# Patient Record
Sex: FEMALE | Race: BLACK OR AFRICAN AMERICAN | Employment: UNEMPLOYED | URBAN - METROPOLITAN AREA
[De-identification: names, ages, dates, MRNs, and addresses within clinical notes are randomized per-mention and may not be internally consistent; named-entity substitution may affect disease eponyms.]

---

## 2021-05-25 ENCOUNTER — HOSPITAL ENCOUNTER (OUTPATIENT)
Dept: PREADMISSION TESTING | Age: 26
Discharge: HOME | End: 2021-05-25
Payer: COMMERCIAL

## 2021-05-25 PROCEDURE — U0003 INFECTIOUS AGENT DETECTION BY NUCLEIC ACID (DNA OR RNA); SEVERE ACUTE RESPIRATORY SYNDROME CORONAVIRUS 2 (SARS-COV-2) (CORONAVIRUS DISEASE [COVID-19]), AMPLIFIED PROBE TECHNIQUE, MAKING USE OF HIGH THROUGHPUT TECHNOLOGIES AS DESCRIBED BY CMS-2020-01-R: HCPCS

## 2021-05-26 ENCOUNTER — ANESTHESIA EVENT (OUTPATIENT)
Dept: SURGERY | Age: 26
End: 2021-05-26
Payer: COMMERCIAL

## 2021-05-26 LAB — SARS-COV-2, COV2NT: NOT DETECTED

## 2021-05-26 NOTE — PERIOP NOTES
Fax request forms over as well as called,But was unable to reach anyone . I needed Hp's  for Dr Alvarez keyes for tomorrow .

## 2021-05-27 ENCOUNTER — HOSPITAL ENCOUNTER (OUTPATIENT)
Age: 26
Setting detail: OUTPATIENT SURGERY
Discharge: HOME | End: 2021-05-27
Attending: SURGERY | Admitting: SURGERY
Payer: COMMERCIAL

## 2021-05-27 ENCOUNTER — ANESTHESIA (OUTPATIENT)
Dept: SURGERY | Age: 26
End: 2021-05-27
Payer: COMMERCIAL

## 2021-05-27 VITALS
RESPIRATION RATE: 18 BRPM | OXYGEN SATURATION: 99 % | TEMPERATURE: 97.3 F | WEIGHT: 160.9 LBS | SYSTOLIC BLOOD PRESSURE: 164 MMHG | HEART RATE: 60 BPM | HEIGHT: 66 IN | DIASTOLIC BLOOD PRESSURE: 112 MMHG | BODY MASS INDEX: 25.86 KG/M2

## 2021-05-27 LAB — HCG UR QL: NEGATIVE

## 2021-05-27 PROCEDURE — 81025 URINE PREGNANCY TEST: CPT

## 2021-05-27 RX ORDER — SODIUM CHLORIDE 9 MG/ML
50 INJECTION, SOLUTION INTRAVENOUS CONTINUOUS
Status: DISCONTINUED | OUTPATIENT
Start: 2021-05-27 | End: 2021-05-27 | Stop reason: HOSPADM

## 2021-05-27 RX ORDER — CEFAZOLIN SODIUM/WATER 2 G/20 ML
2 SYRINGE (ML) INTRAVENOUS ONCE
Status: DISCONTINUED | OUTPATIENT
Start: 2021-05-27 | End: 2021-05-27 | Stop reason: HOSPADM

## 2021-05-27 NOTE — PERIOP NOTES
0800-Notified Dr. Jennifer Tatum of patient's blood pressures and that patient states she stopped all medications two weeks ago. Also notified her that patient is on labetolol and hydralazine TID. She states she will speak with Dr. Nba Lynne and follow up in a few minutes.

## 2021-05-27 NOTE — PERIOP NOTES
0810-Dr. Davila at patient's bedside. Patient educated on the importance of continuing blood pressure medicine and dialysis as scheduled in order to have surgery. Patient states she has medications at home and denies having a kidney doctor. Patient states she did not get dialyzed yesterday as scheduled because she loses blood and is worried she will lose more blood with surgery. Patient educated on blood transfusion process. Patient states she does not want blood transfusion if she needed it. Educated patient on importance of continuing blood pressure medication and dialysis schedule if she reschedules with Dr. Randa Arango office.

## 2021-05-27 NOTE — PERIOP NOTES
Reviewed PTA medication list with patient/caregiver and patient/caregiver denies any additional medications. Patient admits to having a responsible adult care for them at home for at least 24 hours after surgery. Patient encouraged to use gown warming system and informed that using said warming gown to regulate body temperature prior to a procedure has been shown to help reduce the risks of blood clots and infection. Patient's pharmacy of choice verified and documented in PTA medication section. Dual skin assessment & fall risk band verification completed with Herman Larson RN.

## 2021-05-27 NOTE — PROGRESS NOTES
Anesthesia note  Called by preop nurse that pt had not taken any of her 3 blood pressure and did not go to dialysis yesterday. Her diastolic BP is in the 278C and has not changed. I discussed with Dr. Madhavi De Leon and the patient and we will postpone her procedure until she is compliant with her meds and dialysis.     Mac Reece MD

## 2021-06-03 ENCOUNTER — ANESTHESIA EVENT (OUTPATIENT)
Dept: SURGERY | Age: 26
End: 2021-06-03
Payer: COMMERCIAL

## 2021-06-03 ENCOUNTER — HOSPITAL ENCOUNTER (OUTPATIENT)
Age: 26
Setting detail: OUTPATIENT SURGERY
Discharge: HOME | End: 2021-06-03
Attending: SURGERY | Admitting: SURGERY
Payer: COMMERCIAL

## 2021-06-03 ENCOUNTER — ANESTHESIA (OUTPATIENT)
Dept: SURGERY | Age: 26
End: 2021-06-03
Payer: COMMERCIAL

## 2021-06-03 VITALS
OXYGEN SATURATION: 100 % | SYSTOLIC BLOOD PRESSURE: 134 MMHG | RESPIRATION RATE: 15 BRPM | DIASTOLIC BLOOD PRESSURE: 75 MMHG | WEIGHT: 157.7 LBS | BODY MASS INDEX: 25.34 KG/M2 | HEIGHT: 66 IN | HEART RATE: 85 BPM | TEMPERATURE: 98 F

## 2021-06-03 DIAGNOSIS — Z98.890 S/P LAPAROSCOPY: Primary | ICD-10-CM

## 2021-06-03 LAB
BASOPHILS # BLD: 0 K/UL (ref 0–0.1)
BASOPHILS NFR BLD: 0 % (ref 0–2)
DIFFERENTIAL METHOD BLD: ABNORMAL
EOSINOPHIL # BLD: 0.2 K/UL (ref 0–0.4)
EOSINOPHIL NFR BLD: 3 % (ref 0–5)
ERYTHROCYTE [DISTWIDTH] IN BLOOD BY AUTOMATED COUNT: 16.3 % (ref 11.6–14.5)
HCG UR QL: NEGATIVE
HCT VFR BLD AUTO: 31.4 % (ref 35–45)
HGB BLD-MCNC: 9.4 G/DL (ref 12–16)
LYMPHOCYTES # BLD: 1.2 K/UL (ref 0.9–3.6)
LYMPHOCYTES NFR BLD: 22 % (ref 21–52)
MCH RBC QN AUTO: 29 PG (ref 24–34)
MCHC RBC AUTO-ENTMCNC: 29.9 G/DL (ref 31–37)
MCV RBC AUTO: 96.9 FL (ref 74–97)
MONOCYTES # BLD: 0.6 K/UL (ref 0.05–1.2)
MONOCYTES NFR BLD: 10 % (ref 3–10)
NEUTS SEG # BLD: 3.6 K/UL (ref 1.8–8)
NEUTS SEG NFR BLD: 64 % (ref 40–73)
PLATELET # BLD AUTO: 193 K/UL (ref 135–420)
PMV BLD AUTO: 12 FL (ref 9.2–11.8)
POTASSIUM SERPL-SCNC: 4.3 MMOL/L (ref 3.5–5.5)
RBC # BLD AUTO: 3.24 M/UL (ref 4.2–5.3)
WBC # BLD AUTO: 5.6 K/UL (ref 4.6–13.2)

## 2021-06-03 PROCEDURE — 74011250636 HC RX REV CODE- 250/636: Performed by: SURGERY

## 2021-06-03 PROCEDURE — 84132 ASSAY OF SERUM POTASSIUM: CPT

## 2021-06-03 PROCEDURE — 81025 URINE PREGNANCY TEST: CPT

## 2021-06-03 PROCEDURE — 2709999900 HC NON-CHARGEABLE SUPPLY: Performed by: SURGERY

## 2021-06-03 PROCEDURE — 77030012969 HC TBNG DLYS BAXT -B: Performed by: SURGERY

## 2021-06-03 PROCEDURE — 76010000149 HC OR TIME 1 TO 1.5 HR: Performed by: SURGERY

## 2021-06-03 PROCEDURE — 74011250636 HC RX REV CODE- 250/636: Performed by: REGISTERED NURSE

## 2021-06-03 PROCEDURE — 77030006643: Performed by: STUDENT IN AN ORGANIZED HEALTH CARE EDUCATION/TRAINING PROGRAM

## 2021-06-03 PROCEDURE — 77030008603 HC TRCR ENDOSC EPATH J&J -C: Performed by: SURGERY

## 2021-06-03 PROCEDURE — 77030012770 HC TRCR OPT FX AMR -B: Performed by: SURGERY

## 2021-06-03 PROCEDURE — 76060000033 HC ANESTHESIA 1 TO 1.5 HR: Performed by: SURGERY

## 2021-06-03 PROCEDURE — 74011000250 HC RX REV CODE- 250: Performed by: REGISTERED NURSE

## 2021-06-03 PROCEDURE — 74011000250 HC RX REV CODE- 250: Performed by: SURGERY

## 2021-06-03 PROCEDURE — 77030008477 HC STYL SATN SLP COVD -A: Performed by: STUDENT IN AN ORGANIZED HEALTH CARE EDUCATION/TRAINING PROGRAM

## 2021-06-03 PROCEDURE — 76210000063 HC OR PH I REC FIRST 0.5 HR: Performed by: SURGERY

## 2021-06-03 PROCEDURE — 77030008518 HC TBNG INSUF ENDO STRY -B: Performed by: SURGERY

## 2021-06-03 PROCEDURE — 77030004495 HC ADPT PERI DLYS BAXT -C: Performed by: SURGERY

## 2021-06-03 PROCEDURE — 77030008683 HC TU ET CUF COVD -A: Performed by: STUDENT IN AN ORGANIZED HEALTH CARE EDUCATION/TRAINING PROGRAM

## 2021-06-03 PROCEDURE — 76210000020 HC REC RM PH II FIRST 0.5 HR: Performed by: SURGERY

## 2021-06-03 PROCEDURE — 77030013079 HC BLNKT BAIR HGGR 3M -A: Performed by: STUDENT IN AN ORGANIZED HEALTH CARE EDUCATION/TRAINING PROGRAM

## 2021-06-03 PROCEDURE — 77030031139 HC SUT VCRL2 J&J -A: Performed by: SURGERY

## 2021-06-03 PROCEDURE — 77030020782 HC GWN BAIR PAWS FLX 3M -B: Performed by: SURGERY

## 2021-06-03 PROCEDURE — 77030010507 HC ADH SKN DERMBND J&J -B: Performed by: SURGERY

## 2021-06-03 PROCEDURE — C1750 CATH, HEMODIALYSIS,LONG-TERM: HCPCS | Performed by: SURGERY

## 2021-06-03 PROCEDURE — 77030020829: Performed by: SURGERY

## 2021-06-03 PROCEDURE — 36415 COLL VENOUS BLD VENIPUNCTURE: CPT

## 2021-06-03 PROCEDURE — 77030002933 HC SUT MCRYL J&J -A: Performed by: SURGERY

## 2021-06-03 PROCEDURE — 85025 COMPLETE CBC W/AUTO DIFF WBC: CPT

## 2021-06-03 PROCEDURE — 77030040361 HC SLV COMPR DVT MDII -B: Performed by: SURGERY

## 2021-06-03 RX ORDER — MIDAZOLAM HYDROCHLORIDE 1 MG/ML
INJECTION, SOLUTION INTRAMUSCULAR; INTRAVENOUS AS NEEDED
Status: DISCONTINUED | OUTPATIENT
Start: 2021-06-03 | End: 2021-06-03 | Stop reason: HOSPADM

## 2021-06-03 RX ORDER — SODIUM CHLORIDE 9 MG/ML
500 INJECTION, SOLUTION INTRAVENOUS CONTINUOUS
Status: DISCONTINUED | OUTPATIENT
Start: 2021-06-03 | End: 2021-06-03 | Stop reason: HOSPADM

## 2021-06-03 RX ORDER — NALOXONE HYDROCHLORIDE 0.4 MG/ML
0.04 INJECTION, SOLUTION INTRAMUSCULAR; INTRAVENOUS; SUBCUTANEOUS
Status: DISCONTINUED | OUTPATIENT
Start: 2021-06-03 | End: 2021-06-03 | Stop reason: HOSPADM

## 2021-06-03 RX ORDER — OXYCODONE AND ACETAMINOPHEN 5; 325 MG/1; MG/1
1 TABLET ORAL
Qty: 18 TABLET | Refills: 0 | Status: SHIPPED | OUTPATIENT
Start: 2021-06-03 | End: 2021-06-06

## 2021-06-03 RX ORDER — ROCURONIUM BROMIDE 10 MG/ML
INJECTION, SOLUTION INTRAVENOUS AS NEEDED
Status: DISCONTINUED | OUTPATIENT
Start: 2021-06-03 | End: 2021-06-03 | Stop reason: HOSPADM

## 2021-06-03 RX ORDER — LIDOCAINE HYDROCHLORIDE 20 MG/ML
INJECTION, SOLUTION EPIDURAL; INFILTRATION; INTRACAUDAL; PERINEURAL AS NEEDED
Status: DISCONTINUED | OUTPATIENT
Start: 2021-06-03 | End: 2021-06-03 | Stop reason: HOSPADM

## 2021-06-03 RX ORDER — SODIUM CHLORIDE 0.9 % (FLUSH) 0.9 %
5-40 SYRINGE (ML) INJECTION EVERY 8 HOURS
Status: DISCONTINUED | OUTPATIENT
Start: 2021-06-03 | End: 2021-06-03 | Stop reason: HOSPADM

## 2021-06-03 RX ORDER — ONDANSETRON 2 MG/ML
INJECTION INTRAMUSCULAR; INTRAVENOUS AS NEEDED
Status: DISCONTINUED | OUTPATIENT
Start: 2021-06-03 | End: 2021-06-03 | Stop reason: HOSPADM

## 2021-06-03 RX ORDER — HYDROMORPHONE HYDROCHLORIDE 2 MG/ML
INJECTION, SOLUTION INTRAMUSCULAR; INTRAVENOUS; SUBCUTANEOUS AS NEEDED
Status: DISCONTINUED | OUTPATIENT
Start: 2021-06-03 | End: 2021-06-03 | Stop reason: HOSPADM

## 2021-06-03 RX ORDER — DIPHENHYDRAMINE HYDROCHLORIDE 50 MG/ML
12.5 INJECTION, SOLUTION INTRAMUSCULAR; INTRAVENOUS
Status: DISCONTINUED | OUTPATIENT
Start: 2021-06-03 | End: 2021-06-03 | Stop reason: HOSPADM

## 2021-06-03 RX ORDER — PROPOFOL 10 MG/ML
INJECTION, EMULSION INTRAVENOUS AS NEEDED
Status: DISCONTINUED | OUTPATIENT
Start: 2021-06-03 | End: 2021-06-03 | Stop reason: HOSPADM

## 2021-06-03 RX ORDER — SODIUM CHLORIDE, SODIUM LACTATE, POTASSIUM CHLORIDE, CALCIUM CHLORIDE 600; 310; 30; 20 MG/100ML; MG/100ML; MG/100ML; MG/100ML
50 INJECTION, SOLUTION INTRAVENOUS CONTINUOUS
Status: DISCONTINUED | OUTPATIENT
Start: 2021-06-03 | End: 2021-06-03 | Stop reason: HOSPADM

## 2021-06-03 RX ORDER — SODIUM CHLORIDE 0.9 % (FLUSH) 0.9 %
5-40 SYRINGE (ML) INJECTION AS NEEDED
Status: DISCONTINUED | OUTPATIENT
Start: 2021-06-03 | End: 2021-06-03 | Stop reason: HOSPADM

## 2021-06-03 RX ORDER — DEXAMETHASONE SODIUM PHOSPHATE 4 MG/ML
INJECTION, SOLUTION INTRA-ARTICULAR; INTRALESIONAL; INTRAMUSCULAR; INTRAVENOUS; SOFT TISSUE AS NEEDED
Status: DISCONTINUED | OUTPATIENT
Start: 2021-06-03 | End: 2021-06-03 | Stop reason: HOSPADM

## 2021-06-03 RX ORDER — FENTANYL CITRATE 50 UG/ML
50 INJECTION, SOLUTION INTRAMUSCULAR; INTRAVENOUS
Status: DISCONTINUED | OUTPATIENT
Start: 2021-06-03 | End: 2021-06-03 | Stop reason: HOSPADM

## 2021-06-03 RX ORDER — CEFAZOLIN SODIUM/WATER 2 G/20 ML
2 SYRINGE (ML) INTRAVENOUS ONCE
Status: COMPLETED | OUTPATIENT
Start: 2021-06-03 | End: 2021-06-03

## 2021-06-03 RX ORDER — ONDANSETRON 8 MG/1
8 TABLET, ORALLY DISINTEGRATING ORAL
Qty: 12 TABLET | Refills: 0 | Status: SHIPPED | OUTPATIENT
Start: 2021-06-03 | End: 2022-04-22

## 2021-06-03 RX ORDER — FENTANYL CITRATE 50 UG/ML
INJECTION, SOLUTION INTRAMUSCULAR; INTRAVENOUS AS NEEDED
Status: DISCONTINUED | OUTPATIENT
Start: 2021-06-03 | End: 2021-06-03 | Stop reason: HOSPADM

## 2021-06-03 RX ORDER — HYDROMORPHONE HYDROCHLORIDE 1 MG/ML
0.5 INJECTION, SOLUTION INTRAMUSCULAR; INTRAVENOUS; SUBCUTANEOUS AS NEEDED
Status: DISCONTINUED | OUTPATIENT
Start: 2021-06-03 | End: 2021-06-03 | Stop reason: HOSPADM

## 2021-06-03 RX ORDER — GLYCOPYRROLATE 0.2 MG/ML
INJECTION INTRAMUSCULAR; INTRAVENOUS AS NEEDED
Status: DISCONTINUED | OUTPATIENT
Start: 2021-06-03 | End: 2021-06-03 | Stop reason: HOSPADM

## 2021-06-03 RX ORDER — BUPIVACAINE HYDROCHLORIDE AND EPINEPHRINE 5; 5 MG/ML; UG/ML
INJECTION, SOLUTION EPIDURAL; INTRACAUDAL; PERINEURAL AS NEEDED
Status: DISCONTINUED | OUTPATIENT
Start: 2021-06-03 | End: 2021-06-03 | Stop reason: HOSPADM

## 2021-06-03 RX ORDER — NALBUPHINE HYDROCHLORIDE 10 MG/ML
5 INJECTION, SOLUTION INTRAMUSCULAR; INTRAVENOUS; SUBCUTANEOUS
Status: DISCONTINUED | OUTPATIENT
Start: 2021-06-03 | End: 2021-06-03 | Stop reason: HOSPADM

## 2021-06-03 RX ADMIN — SUGAMMADEX 200 MG: 100 INJECTION, SOLUTION INTRAVENOUS at 14:12

## 2021-06-03 RX ADMIN — PROPOFOL 200 MG: 10 INJECTION, EMULSION INTRAVENOUS at 13:17

## 2021-06-03 RX ADMIN — SODIUM CHLORIDE 500 ML: 900 INJECTION, SOLUTION INTRAVENOUS at 09:30

## 2021-06-03 RX ADMIN — HYDROMORPHONE HYDROCHLORIDE 0.5 MG: 2 INJECTION, SOLUTION INTRAMUSCULAR; INTRAVENOUS; SUBCUTANEOUS at 14:05

## 2021-06-03 RX ADMIN — LIDOCAINE HYDROCHLORIDE 80 MG: 20 INJECTION, SOLUTION EPIDURAL; INFILTRATION; INTRACAUDAL; PERINEURAL at 13:17

## 2021-06-03 RX ADMIN — MIDAZOLAM 2 MG: 1 INJECTION INTRAMUSCULAR; INTRAVENOUS at 13:11

## 2021-06-03 RX ADMIN — ROCURONIUM BROMIDE 10 MG: 10 INJECTION, SOLUTION INTRAVENOUS at 13:43

## 2021-06-03 RX ADMIN — CEFAZOLIN 2 G: 1 INJECTION, POWDER, FOR SOLUTION INTRAVENOUS at 13:27

## 2021-06-03 RX ADMIN — FENTANYL CITRATE 100 MCG: 50 INJECTION, SOLUTION INTRAMUSCULAR; INTRAVENOUS at 13:12

## 2021-06-03 RX ADMIN — DEXAMETHASONE SODIUM PHOSPHATE 4 MG: 4 INJECTION, SOLUTION INTRAMUSCULAR; INTRAVENOUS at 13:28

## 2021-06-03 RX ADMIN — GLYCOPYRROLATE 0.2 MG: 0.2 INJECTION INTRAMUSCULAR; INTRAVENOUS at 13:27

## 2021-06-03 RX ADMIN — ONDANSETRON HYDROCHLORIDE 4 MG: 2 INJECTION INTRAMUSCULAR; INTRAVENOUS at 13:29

## 2021-06-03 RX ADMIN — HYDROMORPHONE HYDROCHLORIDE 0.5 MG: 2 INJECTION, SOLUTION INTRAMUSCULAR; INTRAVENOUS; SUBCUTANEOUS at 13:54

## 2021-06-03 NOTE — PERIOP NOTES
Patient scheduled to arrive at 8:30am called at 8:40am to verify patient arrival, patient stated they were 10mins ago and that they were in traffic.

## 2021-06-03 NOTE — ANESTHESIA POSTPROCEDURE EVALUATION
Post-Anesthesia Evaluation and Assessment    Cardiovascular Function/Vital Signs  Visit Vitals  /81   Pulse 86   Temp (P) 36.7 °C (98 °F)   Resp 15   Ht 5' 6\" (1.676 m)   Wt 71.5 kg (157 lb 11.2 oz)   SpO2 97%   BMI 25.45 kg/m²       Patient is status post Procedure(s):  LAPAROSCOPIC PERITONEAL DIALYSIS CATHETER INSERTION,  OMENTOPEXY \"SPEC POP\" PT NEEDS RAPID COVID ON ADMISSIOON. Nausea/Vomiting: Controlled. Postoperative hydration reviewed and adequate. Pain:  Pain Scale 1: Numeric (0 - 10) (06/03/21 1448)  Pain Intensity 1: 0 (06/03/21 1448)   Managed. Neurological Status:   Neuro (WDL): Within Defined Limits (06/03/21 1448)   At baseline. Mental Status and Level of Consciousness: Baseline and appropriate for discharge. Pulmonary Status:   O2 Device: None (Room air) (06/03/21 1443)   Adequate oxygenation and airway patent. Complications related to anesthesia: None    Post-anesthesia assessment completed. No concerns. Patient has met all discharge requirements.     Signed By: Dina Henriquez MD    Gila 3, 2021

## 2021-06-03 NOTE — ANESTHESIA PREPROCEDURE EVALUATION
Relevant Problems   No relevant active problems       Anesthetic History   No history of anesthetic complications     Pertinent negatives: No PONV       Review of Systems / Medical History  Patient summary reviewed, nursing notes reviewed and pertinent labs reviewed    Pulmonary  Within defined limits            Pertinent negatives: No COPD, asthma and sleep apnea     Neuro/Psych         Psychiatric history (Anxiety and Depression)  Pertinent negatives: No seizures and CVA   Cardiovascular    Hypertension            Pertinent negatives: No past MI and CHF       GI/Hepatic/Renal     GERD    Renal disease: ESRD and dialysis    Pertinent negatives: No liver disease   Endo/Other      Hypothyroidism       Other Findings            Physical Exam    Airway  Mallampati: I  TM Distance: 4 - 6 cm  Neck ROM: normal range of motion   Mouth opening: Normal     Cardiovascular    Rhythm: regular  Rate: normal         Dental  No notable dental hx       Pulmonary  Breath sounds clear to auscultation               Abdominal  GI exam deferred       Other Findings            Anesthetic Plan    ASA: 4  Anesthesia type: general          Induction: Intravenous  Anesthetic plan and risks discussed with: Patient

## 2021-06-03 NOTE — INTERVAL H&P NOTE
Update History & Physical    The Patient's History and Physical of Gila 3,   2021 was reviewed with the patient and I examined the patient. There was no change. The surgical site was confirmed by the patient and me. Plan:  The risk, benefits, expected outcome, and alternative to the recommended procedure have been discussed with the patient. Patient understands and wants to proceed with the procedure.     Electronically signed by Dipti Bolaños MD on 6/3/2021 at 12:48 PM

## 2021-06-03 NOTE — BRIEF OP NOTE
Brief Postoperative Note    Patient: Solitario Berg  YOB: 1995  MRN: [id number]    Date of Procedure: 6/3/2021     Pre-Op Diagnosis: ERSD ON HD    Post-Op Diagnosis: Same as preoperative diagnosis.       Procedure(s):  LAPAROSCOPIC PERITONEAL DIALYSIS CATHETER INSERTION, OMENTOPEXY    Surgeon(s):  Giovany Paulson MD    Surgical Assistant: Surg Asst-1: Solo Jenkins    Anesthesia: General     Estimated Blood Loss (mL): Minimal    Complications: None    Specimens: * No specimens in log *     Implants: * No implants in log *    Drains: * No LDAs found *    Findings: no abdominal/pelvic adhesions    Electronically Signed by Bharath Shea MD on 6/3/2021 at 2:46 PM    Op note dictated #[id number]  RP

## 2021-06-03 NOTE — DISCHARGE INSTRUCTIONS
Patient armband removed and shredded    Contact Dr. Coleen Tejeda office for post-op PD catheter care. Peritoneal Dialysis Catheter Care: Care Instructions  Your Care Instructions     Dialysis does the work of your kidneys when you have kidney failure. It filters wastes and removes extra fluid. It also keeps the right balance of chemicals in your blood. Peritoneal dialysis uses the lining of your belly to filter your blood. Before you start dialysis, your doctor creates a dialysis access. This is the place where the dialysis solution can flow into and out of your body. To make the access, the doctor places a soft tube in your belly. This tube is called a catheter. When you do dialysis, the solution flows into your belly and stays there for several hours. Then you remove it through the catheter. It is important to take care of the catheter and the access area to prevent infection. Follow-up care is a key part of your treatment and safety. Be sure to make and go to all appointments, and call your doctor if you are having problems. It's also a good idea to know your test results and keep a list of the medicines you take. How can you care for yourself at home? Care of the catheter and access  · After the doctor creates your access, keep the bandage dry and clean. Change a dirty or bloody bandage. · Keep your access area clean and dry. Check it every day for signs of infection. · Always clean and dry your catheter and access area right away after you get wet. · Always wash your hands before you touch the catheter. · Fasten or tape the catheter to your body to keep it from catching on your clothes. · Never use scissors or other sharp objects around your catheter. · Do not use unapproved clamps on your catheter. · Store your dialysis supplies in a cool, dry place. Activity when you have an access  · Do not lift heavy objects. · Do not swim or take a bath unless your doctor tells you it is okay.   When should you call for help? Call your doctor now or seek immediate medical care if:    · You have signs of infection, such as:  ? Increased pain, swelling, warmth, or redness. ? Red streaks leading from the access area. ? Pus draining from the access area. ? A fever.     · You have belly pain.     · You have nausea or vomiting.     · The dialysis fluid looks cloudy or is a different color.     · Fluid does not flow through the catheter. Watch closely for changes in your health, and be sure to contact your doctor if you have any problems. Where can you learn more? Go to http://www.gray.com/  Enter R310 in the search box to learn more about \"Peritoneal Dialysis Catheter Care: Care Instructions. \"  Current as of: December 17, 2020               Content Version: 12.8  © 2006-2021 [employer]. Care instructions adapted under license by [employer] (which disclaims liability or warranty for this information). If you have questions about a medical condition or this instruction, always ask your healthcare professional. [place] [address] any warranty or liability for your use of this information. DISCHARGE SUMMARY from Nurse    PATIENT INSTRUCTIONS:    After general anesthesia or intravenous sedation, for 24 hours or while taking prescription Narcotics:  · Limit your activities  · Do not drive and operate hazardous machinery  · Do not make important personal or business decisions  · Do  not drink alcoholic beverages  · If you have not urinated within 8 hours after discharge, please contact your surgeon on call.     Report the following to your surgeon:  · Excessive pain, swelling, redness or odor of or around the surgical area  · Temperature over 100.5  · Nausea and vomiting lasting longer than 4 hours or if unable to take medications  · Any signs of decreased circulation or nerve impairment to extremity: change in color, persistent  numbness, tingling, coldness or increase pain  · Any questions    What to do at Home:  Recommended activity: Activity as tolerated, Activity as tolerated and no driving for today and Ambulate in house,     If you experience any of the following symptoms as above, please follow up with Dr. Coleen Tejeda.    *  Please give a list of your current medications to your Primary Care Provider. *  Please update this list whenever your medications are discontinued, doses are      changed, or new medications (including over-the-counter products) are added. *  Please carry medication information at all times in case of emergency situations. These are general instructions for a healthy lifestyle:    No smoking/ No tobacco products/ Avoid exposure to second hand smoke  Surgeon General's Warning:  Quitting smoking now greatly reduces serious risk to your health. Obesity, smoking, and sedentary lifestyle greatly increases your risk for illness    A healthy diet, regular physical exercise & weight monitoring are important for maintaining a healthy lifestyle    You may be retaining fluid if you have a history of heart failure or if you experience any of the following symptoms:  Weight gain of 3 pounds or more overnight or 5 pounds in a week, increased swelling in our hands or feet or shortness of breath while lying flat in bed. Please call your doctor as soon as you notice any of these symptoms; do not wait until your next office visit. Learning About Coronavirus [phone])  Coronavirus [phone]): Overview  What is coronavirus (COVID-19)? The coronavirus disease (COVID-19) is caused by a virus. It is an illness that was first found in [place], [place], in December 2019. It has since spread worldwide. The virus can cause fever, cough, and trouble breathing. In severe cases, it can cause pneumonia and make it hard to breathe without help. It can cause death. Coronaviruses are a large group of viruses. They cause the common cold.  They also cause more serious illnesses like [place] East respiratory syndrome (MERS) and severe acute respiratory syndrome (SARS). COVID-19 is caused by a novel coronavirus. That means it's a new type that has not been seen in people before. This virus spreads person-to-person through droplets from coughing and sneezing. It can also spread when you are close to someone who is infected. And it can spread when you touch something that has the virus on it, such as a doorknob or a tabletop. What can you do to protect yourself from coronavirus (COVID-19)? The best way to protect yourself from getting sick is to:  · Avoid areas where there is an outbreak. · Avoid contact with people who may be infected. · Wash your hands often with soap or alcohol-based hand sanitizers. · Avoid crowds and try to stay at least 6 feet away from other people. · Wash your hands often, especially after you cough or sneeze. Use soap and water, and scrub for at least 20 seconds. If soap and water aren't available, use an alcohol-based hand . · Avoid touching your mouth, nose, and eyes. What can you do to avoid spreading the virus to others? To help avoid spreading the virus to others:  · Cover your mouth with a tissue when you cough or sneeze. Then throw the tissue in the trash. · Use a disinfectant to clean things that you touch often. · Stay home if you are sick or have been exposed to the virus. Don't go to school, work, or public areas. And don't use public transportation. · If you are sick:  ? Leave your home only if you need to get medical care. But call the doctor's office first so they know you're coming. And wear a face mask, if you have one.  ? If you have a face mask, wear it whenever you're around other people. It can help stop the spread of the virus when you cough or sneeze. ? Clean and disinfect your home every day. Use household  and disinfectant wipes or sprays.  Take special care to clean things that you grab with your hands. These include doorknobs, remote controls, phones, and handles on your refrigerator and microwave. And don't forget countertops, tabletops, bathrooms, and computer keyboards. When to call for help  Call 911 anytime you think you may need emergency care. For example, call if:  · You have severe trouble breathing. (You can't talk at all.)  · You have constant chest pain or pressure. · You are severely dizzy or lightheaded. · You are confused or can't think clearly. · Your face and lips have a blue color. · You pass out (lose consciousness) or are very hard to wake up. Call your doctor now if you develop symptoms such as:  · Shortness of breath. · Fever. · Cough. If you need to get care, call ahead to the doctor's office for instructions before you go. Make sure you wear a face mask, if you have one, to prevent exposing other people to the virus. Where can you get the latest information? The following health organizations are tracking and studying this virus. Their websites contain the most up-to-date information. Kattycortneykatie Arzola also learn what to do if you think you may have been exposed to the virus. · U.S. Centers for Disease Control and Prevention (CDC): The CDC provides updated news about the disease and travel advice. The website also tells you how to prevent the spread of infection. www.cdc.gov  · World Health Organization [facility]): WHO offers information about the virus outbreaks. WHO also has travel advice. www.who.int  Current as of: April 1, 2020               Content Version: 12.4  © 2006-2020 [employer]. Care instructions adapted under license by your healthcare professional. If you have questions about a medical condition or this instruction, always ask your healthcare professional. [address] any warranty or liability for your use of this information. The discharge information has been reviewed with the patient and parent.   The patient and parent verbalized understanding. Discharge medications reviewed with the patient and caregiver and appropriate educational materials and side effects teaching were provided.   ___________________________________________________________________________________________________________________________________

## 2021-06-03 NOTE — PERIOP NOTES
Reviewed PTA medication list with patient/caregiver and patient/caregiver denies any additional medications. Patient admits to having a responsible adult care for them at home for at least 24 hours after surgery. Patient encouraged to use gown warming system and informed that using said warming gown to regulate body temperature prior to a procedure has been shown to help reduce the risks of blood clots and infection. Patient's pharmacy of choice verified and documented in PTA medication section. Dual skin assessment & fall risk band verification completed with Pat Roberts RN.

## 2021-06-03 NOTE — PERIOP NOTES
Reviewed PTA medication list with patient/caregiver and patient/caregiver denies any additional medications. Patient admits to having a responsible adult care for them at home for at least 24 hours after surgery. Patient encouraged to use gown warming system and informed that using said warming gown to regulate body temperature prior to a procedure has been shown to help reduce the risks of blood clots and infection. Patient's pharmacy of choice verified and documented in PTA medication section. Dual skin assessment & fall risk band verification completed with [employer].

## 2021-06-04 NOTE — OP NOTES
[facility]  OPERATIVE REPORT    Name:  Pankaj Tompkins  MR#:   [id number]  :  1995  ACCOUNT #:  [de-identified]  DATE OF SERVICE:  2021    GENERAL SURGERY AMBULATORY OPERATIVE NOTE    PREOPERATIVE DIAGNOSIS:  End-stage renal disease, on hemodialysis. POSTOPERATIVE DIAGNOSIS:  End-stage renal disease, on hemodialysis. PROCEDURES PERFORMED:  1. Laparoscopic peritoneal dialysis catheter insertion. 2.  Omentopexy. SURGEON:  Elio Angelucci, MD    ASSISTANT:  None. ANESTHESIA:  General.    COMPLICATIONS:  None. SPECIMENS REMOVED:  None. CATHETER/IMPLANTS INSERTED:  A 62-cm dual cuff peritoneal dialysis catheter (left-sided abdominal insertion). Other drains none. ESTIMATED BLOOD LOSS:  Minimal.    NARRATIVE OF OPERATION:  The patient is a 27[facility]-old [facility] female with history of hypertension, lupus, and end-stage renal disease, currently on hemodialysis Monday, Wednesday, and Friday. Referred from Nephrology for peritoneal dialysis catheter insertion. I discussed with her the nature of surgery, alternatives, benefits, and risks. She gave consent to proceed. She was taken to the OR on 2021, met and identified in the preoperative holding area. She was then brought to the operating room. Preoperative intravenous Ancef 2 g was given per protocol. She voided in the preoperative holding area admittedly before being brought back into the operating room. PROCEDURE:  In the OR, she was positioned on table with all pressure points appropriately padded. Sequential compression device was applied. General anesthesia was administered with monitors and oxygen in place. The area was shaved with electric clippers, prepped and draped in the usual sterile fashion.   After surgical pause, we first began the procedure by measuring the distance from the pubic bone for skin insertion and peritoneal insertion for cuff placement overlying both the right and left rectus muscle. The catheter was then soaked in saline getting the air out of the cuffs as much as possible. We started the procedure then by first locally anesthetizing the surgical site in the supraumbilical location. A 5-mm incision was made here transversely with 15-blade through skin and dermis. The underlying subcutaneous tissue was bluntly dissected down to the midline fascia using Huong clamp. Then while elevating the adjacent umbilicus using penetrating towel clamp, Veress needle was inserted through the midline fascia, confirmed to be in the correct intra-abdominal position using saline aspiration followed by injection. Once this position was confirmed, Veress needle was inserted through the midline fascia, confirmed to be in the correct intra-abdominal position using saline aspiration followed by injection. Once this position was confirmed, Veress needle was used to establish pneumoperitoneum to a pressure of 15 mmHg. Once this pressure was achieved, the Veress needle was removed, and a 5-mm bladeless trocar was inserted while elevating the adjacent umbilicus using penetrating towel clamp. A 30-degree laparoscope was inserted. There was no evidence of any Veress needle or trocar-induced trauma to the underlying abdominal viscera. This area was again visualized at the end of the procedure from the right-sided trocar. There were no adhesions in this area, and again, no evidence of trauma to the underlying abdominal viscera. The patient was placed in Trendelenburg position. There were no adhesions anywhere in the abdomen including the pelvis. Therefore, a 5-mm transverse right-sided abdominal incision was made above the level of the umbilicus lateral to rectus muscle. Through this, a 5-mm bladeless trocar was inserted under laparoscopic vision. We then made a 2-cm incision overlying the left rectus muscle at the previously marked site after locally anesthetizing the site.   This was done with 15-blade through skin and dermis. The underlying subcutaneous tissue was Bovie and bluntly dissected down to the anterior sheath. A small nick incision was made in the anterior sheath. The underlying rectus muscle was dissected in the direction of its fibers using Huong clamp. An 8-mm bladeless trocar was inserted through this and guided under laparoscopic vision along the posterior sheath and peritoneum into the pelvis, directed slightly medially as it was guided caudally into the pelvis. It was brought out through the peritoneum at the other previously externally marked site, and then, the pigtail portion of the catheter was advanced in Seldinger fashion through the trocar, guided into the pelvis into the retrouterine space position so that the distal cuff lied just inside the peritoneal insertion site and the proximal cuff lied just below the anterior sheath within the superficial rectus muscle itself. Then used tunneling device to bring the catheter in a gentle arcing fashion to the exit site in the left upper quadrant. This was done through an 11-blade nick incision. The remainder of the catheter apparatus was applied. The patient was then placed in level position, irrigated the pelvis with 950 mL of heparinized saline, and then gravity drained 700 mL of this out. Hemostasis was satisfactory throughout. Fluid coming out was completely clear. After removal of 700 mL, the catheter was irrigated one final time with 50 mL of fresh unused heparinized saline leaving this heparinized saline within the  catheter and leaving approximately 200 mL of heparinized saline in the pelvis to allow the pigtail portion of the catheter to \"float\" in the early postoperative period. Catheter was clamped and capped.   Then performed an omentopexy grabbing the most dependent portion of the omentum as it came off the transverse colon and then through an 11-blade nick incision in the left upper quadrant, I did a two-point transfascial fixation gastropexy using 2-0 Prolene sutures and sharp suture passer, all under laparoscopic vision. Hemostasis was satisfactory throughout the entire procedure. On final inspection, both of the catheter cuffs were in good position. The pigtail portion remained in good position deep in the pelvis. The right-sided trocar was removed. Hemostasis was satisfactory at this site. Pneumoperitoneum was evacuated through the supraumbilical incision before it too was removed. The left-sided insertion incision was closed in layers with interrupted 3-0 Vicryl dermal suture followed by running 4-0 Monocryl subcuticular suture to close the skin. Both 5-mm trocar sites were closed with 4-0 Monocryl subcuticular suture. All incisions and the 11-blade nick incision were closed with a simple 4-0 Monocryl suture. All closed incisions were then cleaned, dried, and dressed with Dermabond. The catheter itself was gently coiled, placed under a gauze and Tegaderm dressing in the left upper quadrant. The patient tolerated the entire procedure without incident. She was extubated in the OR and taken to recovery room postoperatively in stable condition.       Wander Rodriguez MD      [id number]  D:  06/03/2021 14:45  T:  06/03/2021 20:03  JOB #:  [id number]  CC:  ARNOLDO Hayward MD

## 2021-07-13 ENCOUNTER — APPOINTMENT (OUTPATIENT)
Dept: CT IMAGING | Age: 26
End: 2021-07-13
Attending: PHYSICIAN ASSISTANT
Payer: MEDICARE

## 2021-07-13 ENCOUNTER — HOSPITAL ENCOUNTER (EMERGENCY)
Age: 26
Discharge: LEFT BEFORE DISCHARGE | End: 2021-07-13
Attending: EMERGENCY MEDICINE
Payer: MEDICARE

## 2021-07-13 VITALS
HEART RATE: 77 BPM | SYSTOLIC BLOOD PRESSURE: 176 MMHG | BODY MASS INDEX: 25.16 KG/M2 | TEMPERATURE: 98.4 F | DIASTOLIC BLOOD PRESSURE: 139 MMHG | OXYGEN SATURATION: 100 % | HEIGHT: 66 IN | WEIGHT: 156.53 LBS | RESPIRATION RATE: 20 BRPM

## 2021-07-13 DIAGNOSIS — N18.6 STAGE 5 CHRONIC KIDNEY DISEASE ON CHRONIC DIALYSIS (HCC): ICD-10-CM

## 2021-07-13 DIAGNOSIS — Z99.2 PERITONEAL DIALYSIS CATHETER IN PLACE (HCC): ICD-10-CM

## 2021-07-13 DIAGNOSIS — R10.84 ABDOMINAL PAIN, GENERALIZED: ICD-10-CM

## 2021-07-13 DIAGNOSIS — Z53.29 LEFT AGAINST MEDICAL ADVICE: Primary | ICD-10-CM

## 2021-07-13 DIAGNOSIS — Z99.2 STAGE 5 CHRONIC KIDNEY DISEASE ON CHRONIC DIALYSIS (HCC): ICD-10-CM

## 2021-07-13 LAB
ALBUMIN SERPL-MCNC: 3 G/DL (ref 3.4–5)
ALBUMIN/GLOB SERPL: 0.8 {RATIO} (ref 0.8–1.7)
ALP SERPL-CCNC: 95 U/L (ref 45–117)
ALT SERPL-CCNC: 13 U/L (ref 13–56)
ANION GAP SERPL CALC-SCNC: 9 MMOL/L (ref 3–18)
APPEARANCE FLD: ABNORMAL
AST SERPL-CCNC: 20 U/L (ref 10–38)
BASOPHILS # BLD: 0 K/UL (ref 0–0.1)
BASOPHILS NFR BLD: 0 % (ref 0–2)
BILIRUB SERPL-MCNC: 0.3 MG/DL (ref 0.2–1)
BUN SERPL-MCNC: 35 MG/DL (ref 7–18)
BUN/CREAT SERPL: 4 (ref 12–20)
CALCIUM SERPL-MCNC: 7.6 MG/DL (ref 8.5–10.1)
CHLORIDE SERPL-SCNC: 100 MMOL/L (ref 100–111)
CO2 SERPL-SCNC: 28 MMOL/L (ref 21–32)
COLOR FLD: YELLOW
CREAT SERPL-MCNC: 9.71 MG/DL (ref 0.6–1.3)
DIFFERENTIAL METHOD BLD: ABNORMAL
EOSINOPHIL # BLD: 0.1 K/UL (ref 0–0.4)
EOSINOPHIL NFR BLD: 3 % (ref 0–5)
EOSINOPHIL NFR FLD MANUAL: 6 %
ERYTHROCYTE [DISTWIDTH] IN BLOOD BY AUTOMATED COUNT: 15.9 % (ref 11.6–14.5)
GLOBULIN SER CALC-MCNC: 4 G/DL (ref 2–4)
GLUCOSE SERPL-MCNC: 101 MG/DL (ref 74–99)
HCG SERPL QL: NEGATIVE
HCT VFR BLD AUTO: 31 % (ref 35–45)
HGB BLD-MCNC: 10 G/DL (ref 12–16)
LACTATE SERPL-SCNC: 0.9 MMOL/L (ref 0.4–2)
LIPASE SERPL-CCNC: 313 U/L (ref 73–393)
LYMPHOCYTES # BLD: 1.3 K/UL (ref 0.9–3.6)
LYMPHOCYTES NFR BLD: 31 % (ref 21–52)
LYMPHOCYTES NFR FLD: 8 %
MACROPHAGES NFR FLD: 60 %
MCH RBC QN AUTO: 28.3 PG (ref 24–34)
MCHC RBC AUTO-ENTMCNC: 32.3 G/DL (ref 31–37)
MCV RBC AUTO: 87.8 FL (ref 74–97)
MONOCYTES # BLD: 0.2 K/UL (ref 0.05–1.2)
MONOCYTES NFR BLD: 6 % (ref 3–10)
MONOCYTES NFR FLD: 0 %
NEUTROPHILS NFR FLD: 26 %
NEUTS BAND # FLD: 0 %
NEUTS SEG # BLD: 2.5 K/UL (ref 1.8–8)
NEUTS SEG NFR BLD: 60 % (ref 40–73)
NUC CELL # FLD: 68 /CU MM
PLATELET # BLD AUTO: 154 K/UL (ref 135–420)
PLATELET COMMENTS,PCOM: ABNORMAL
POTASSIUM SERPL-SCNC: 3.3 MMOL/L (ref 3.5–5.5)
PROT SERPL-MCNC: 7 G/DL (ref 6.4–8.2)
RBC # BLD AUTO: 3.53 M/UL (ref 4.2–5.3)
RBC # FLD: 93 /CU MM
RBC MORPH BLD: ABNORMAL
RBC MORPH BLD: ABNORMAL
SODIUM SERPL-SCNC: 137 MMOL/L (ref 136–145)
SPECIMEN SOURCE FLD: ABNORMAL
WBC # BLD AUTO: 4.1 K/UL (ref 4.6–13.2)
WBC MORPH BLD: ABNORMAL
WBC MORPH BLD: ABNORMAL

## 2021-07-13 PROCEDURE — 89051 BODY FLUID CELL COUNT: CPT

## 2021-07-13 PROCEDURE — 80053 COMPREHEN METABOLIC PANEL: CPT

## 2021-07-13 PROCEDURE — 85025 COMPLETE CBC W/AUTO DIFF WBC: CPT

## 2021-07-13 PROCEDURE — 87205 SMEAR GRAM STAIN: CPT

## 2021-07-13 PROCEDURE — 84703 CHORIONIC GONADOTROPIN ASSAY: CPT

## 2021-07-13 PROCEDURE — 99282 EMERGENCY DEPT VISIT SF MDM: CPT

## 2021-07-13 PROCEDURE — 87040 BLOOD CULTURE FOR BACTERIA: CPT

## 2021-07-13 PROCEDURE — 87015 SPECIMEN INFECT AGNT CONCNTJ: CPT

## 2021-07-13 PROCEDURE — 87186 SC STD MICRODIL/AGAR DIL: CPT

## 2021-07-13 PROCEDURE — 83690 ASSAY OF LIPASE: CPT

## 2021-07-13 PROCEDURE — 87077 CULTURE AEROBIC IDENTIFY: CPT

## 2021-07-13 PROCEDURE — 83605 ASSAY OF LACTIC ACID: CPT

## 2021-07-13 NOTE — ED TRIAGE NOTES
Patient states that she thinks her PD catheter is infected.  Patient also with complaints of generalized pain

## 2021-07-13 NOTE — ED PROVIDER NOTES
EMERGENCY DEPARTMENT HISTORY AND PHYSICAL EXAM    Date: 7/13/2021  Patient Name: Ashleigh Orozco    History of Presenting Illness     Chief Complaint   Patient presents with    Abdominal Pain         History Provided By: Patient dialysis nurse and her nephrologist    Chief Complaint: abd pain       Additional History (Context):   4:29 PM  Ashleigh Orozco is a 32 y.o. female with PMHX HTN, GERD, Lupus, CKD, hemodialysis, hx of non compliance presents to the emergency department C/O abd pain x 2 weeks. Pt had peritoneal dialysis catheter placed a month ago. She was supposed to begin training but left and went to [place] for 3 weeks. Pt had some hemodialysis while there. She recently returned. She was scheduled to follow up for dialysis yesterday per her usual [facility] schedule but failed to show up to her appointment. Patient arrived late to her appointment today and had to the clinic stay open past hours to perform dialysis on her. During that time they noticed that she had some drainage from the peritoneal dialysis site. It was recommended that she have IV antibiotics given at that time by dialysis nurse as instructed by her nephrologist, Daphnie Vincent. Swabs and samples taken from peritoneal dialysis site prior to leaving her dialysis clinic. Patient arrived in the ED but very poor historian.   States you can talk to \"Dr. Tisha Flowers for all of that information\"     PCP: ARNOLDO Lehman    Current Facility-Administered Medications   Medication Dose Route Frequency Provider Last Rate Last Admin    vancomycin (VANCOCIN) 1,000 mg in 0.9% sodium chloride 250 mL (VIAL-MATE)  1,000 mg IntraVENous ONCE Franny Irizarry, [address]        cefTAZidime (FORTAZ) 2 g in 0.9% sodium chloride (MBP/ADV) 100 mL MBP  2 g IntraVENous NOW Sarah Irizarry PA         Current Outpatient Medications   Medication Sig Dispense Refill    ondansetron (ZOFRAN ODT) 8 mg disintegrating tablet Take 1 Tablet by mouth every eight (8) hours as needed for Nausea or Vomiting. Indications: prevent nausea and vomiting after surgery 12 Tablet 0    albuterol (PROVENTIL HFA, VENTOLIN HFA, PROAIR HFA) 90 mcg/actuation inhaler Take  by inhalation as needed for Wheezing.  amLODIPine (NORVASC) 5 mg tablet Take 5 mg by mouth daily (after lunch).  atorvastatin (LIPITOR) 20 mg tablet Take 20 mg by mouth daily (after lunch).  budesonide (PULMICORT) 0.25 mg/2 mL nbsp by Nebulization route daily (after lunch).  cetirizine (ZYRTEC) 10 mg tablet Take  by mouth daily (after lunch).  divalproex ER (Depakote ER) 500 mg ER tablet Take 500 mg by mouth daily (after lunch).  ergocalciferol (Vitamin D2) 1,250 mcg (50,000 unit) capsule Take 50,000 Units by mouth.  escitalopram oxalate (Lexapro) 10 mg tablet Take 10 mg by mouth daily.  famotidine (PEPCID) 20 mg tablet Take 20 mg by mouth nightly.  ferrous sulfate 324 mg (65 mg iron) tablet Take  by mouth Daily (before breakfast).  fluticasone propion-salmeteroL (Advair Diskus) 250-50 mcg/dose diskus inhaler Take 1 Puff by inhalation every twelve (12) hours.  fluticasone propionate (FLONASE NA) by Nasal route as needed.  b complex-vitamin c-folic acid 0.8 mg (Claribel-Anette) 0.8 mg tab tablet Take 1 Tablet by mouth daily.  hydrALAZINE (APRESOLINE) 50 mg tablet Take 50 mg by mouth three (3) times daily. Indications: high blood pressure      hydrOXYchloroQUINE (Plaquenil) 200 mg tablet Take 200 mg by mouth daily. Indications: Lupus      labetaloL (NORMODYNE) 200 mg tablet Take 200 mg by mouth three (3) times daily.  levothyroxine (SYNTHROID) 100 mcg tablet Take 100 mcg by mouth Daily (before breakfast).  pantoprazole (PROTONIX) 40 mg tablet Take 40 mg by mouth daily.  NIFEdipine ER (PROCARDIA XL) 90 mg ER tablet Take 90 mg by mouth daily.  ondansetron hcl (Zofran) 4 mg tablet Take 4 mg by mouth daily.       sevelamer (RENAGEL) 800 mg tablet Take 800 mg by mouth five (5) times daily. Past History     Past Medical History:  Past Medical History:   Diagnosis Date    Anxiety and depression     Chronic kidney disease     ESRD- fresinius    Chronic pain     GERD (gastroesophageal reflux disease)     Hypertension     Lupus ([address]     Psychiatric disorder     Thyroid disease     hypo       Past Surgical History:  Past Surgical History:   Procedure Laterality Date    HX HEENT  2021    oral surgery    HX UROLOGICAL  67094, 2019    biopsies of kidneys    [place] ABDOMEN SURGERY PROC UNLISTED  2015    Drain a boil       Family History:  History reviewed. No pertinent family history. Social History:  Social History     Tobacco Use    Smoking status: Never Smoker    Smokeless tobacco: Never Used   Vaping Use    Vaping Use: Never used   Substance Use Topics    Alcohol use: Yes     Comment: ocassionally    Drug use: Yes     Types: Marijuana     Comment: Instructed not to smoke 24 hours prior to dos       Allergies: Allergies   Allergen Reactions    Clindamycin Diarrhea    Sulfa (Sulfonamide Antibiotics) Hives     Bactrim       Review of Systems   Review of Systems   Constitutional: Negative for chills and fever. Respiratory: Negative for shortness of breath. Cardiovascular: Negative for chest pain. Gastrointestinal: Positive for abdominal pain and diarrhea. Negative for nausea and vomiting. Neurological: Negative for weakness and numbness. All other systems reviewed and are negative. Physical Exam     Vitals:    07/13/21 1614   BP: (!) 176/139   Pulse: 77   Resp: 20   Temp: 98.4 °F (36.9 °C)   SpO2: 100%   Weight: 71 kg (156 lb 8.4 oz)   Height: 5' 6\" (1.676 m)     Physical Exam  Vitals and nursing note reviewed. Constitutional:       Appearance: She is well-developed. Comments: Alert, lying on stretcher, nontoxic   HENT:      Head: Normocephalic and atraumatic. Cardiovascular:      Rate and Rhythm: Normal rate and regular rhythm.       Heart sounds: Normal heart sounds. No murmur heard. Pulmonary:      Effort: Pulmonary effort is normal. No respiratory distress. Breath sounds: Normal breath sounds. No wheezing or rales. Chest:      Comments: Hemodialysis site to the right upper chest  Abdominal:      General: Bowel sounds are normal.      Palpations: Abdomen is soft. Tenderness: There is generalized abdominal tenderness. There is no guarding or rebound. Comments: Peritoneal dialysis catheter in place to the left abdomen no surrounding erythema at the site, no drainage   Musculoskeletal:      Cervical back: Normal range of motion and neck supple. Neurological:      Mental Status: She is alert and oriented to person, place, and time. Psychiatric:         Judgment: Judgment normal.         Diagnostic Study Results     Labs:     Recent Results (from the past 12 hour(s))   CBC WITH AUTOMATED DIFF    Collection Time: 07/13/21  4:51 PM   Result Value Ref Range    WBC 4.1 (L) 4.6 - 13.2 K/uL    RBC 3.53 (L) 4.20 - 5.30 M/uL    HGB 10.0 (L) 12.0 - 16.0 g/dL    HCT 31.0 (L) 35.0 - 45.0 %    MCV 87.8 74.0 - 97.0 FL    MCH 28.3 24.0 - 34.0 PG    MCHC 32.3 31.0 - 37.0 g/dL    RDW 15.9 (H) 11.6 - 14.5 %    PLATELET 049 980 - 146 K/uL    NEUTROPHILS 60 40 - 73 %    LYMPHOCYTES 31 21 - 52 %    MONOCYTES 6 3 - 10 %    EOSINOPHILS 3 0 - 5 %    BASOPHILS 0 0 - 2 %    ABS. NEUTROPHILS 2.5 1.8 - 8.0 K/UL    ABS. LYMPHOCYTES 1.3 0.9 - 3.6 K/UL    ABS. MONOCYTES 0.2 0.05 - 1.2 K/UL    ABS. EOSINOPHILS 0.1 0.0 - 0.4 K/UL    ABS.  BASOPHILS 0.0 0.0 - 0.1 K/UL    DF MANUAL      PLATELET COMMENTS LARGE PLATELETS      RBC COMMENTS ANISOCYTOSIS  1+        RBC COMMENTS OVALOCYTES  1+        WBC COMMENTS REACTIVE LYMPHS     METABOLIC PANEL, COMPREHENSIVE    Collection Time: 07/13/21  4:51 PM   Result Value Ref Range    Sodium 137 136 - 145 mmol/L    Potassium 3.3 (L) 3.5 - 5.5 mmol/L    Chloride 100 100 - 111 mmol/L    CO2 28 21 - 32 mmol/L    Anion gap 9 3.0 - 18 mmol/L    Glucose 101 (H) 74 - 99 mg/dL    BUN 35 (H) 7.0 - 18 MG/DL    Creatinine 9.71 (H) 0.6 - 1.3 MG/DL    BUN/Creatinine ratio 4 (L) 12 - 20      GFR est AA 6 (L) >60 ml/min/1.73m2    GFR est non-AA 5 (L) >60 ml/min/1.73m2    Calcium 7.6 (L) 8.5 - 10.1 MG/DL    Bilirubin, total 0.3 0.2 - 1.0 MG/DL    ALT (SGPT) 13 13 - 56 U/L    AST (SGOT) 20 10 - 38 U/L    Alk. phosphatase 95 45 - 117 U/L    Protein, total 7.0 6.4 - 8.2 g/dL    Albumin 3.0 (L) 3.4 - 5.0 g/dL    Globulin 4.0 2.0 - 4.0 g/dL    A-G Ratio 0.8 0.8 - 1.7     LIPASE    Collection Time: 07/13/21  4:51 PM   Result Value Ref Range    Lipase 313 73 - 393 U/L   HCG QL SERUM    Collection Time: 07/13/21  4:51 PM   Result Value Ref Range    HCG, Ql. Negative NEG     LACTIC ACID    Collection Time: 07/13/21  5:10 PM   Result Value Ref Range    Lactic acid 0.9 0.4 - 2.0 MMOL/L   CELL COUNT AND DIFF, BODY FLUID    Collection Time: 07/13/21  6:07 PM   Result Value Ref Range    BODY FLUID TYPE Peritoneal Dialysis Fld      FLUID COLOR YELLOW      FLUID APPEARANCE HAZY      FLUID RBC CT. 93 (A) NRRE /cu mm    FLUID NUCLEATED CELLS 68 (A) NRRE /cu mm    FLD NEUTROPHILS 26 %    FLD BANDS 0 %    FLD LYMPHS 8 %    FLD MONOCYTES 0 %    FLD EOSINS 6 %    MACROPHAGE 60 %    WBC COMMENTS FEW MESOTHELIAL CELLS PRESENT        Radiologic Studies:   No orders to display     CT Results  (Last 48 hours)    None        CXR Results  (Last 48 hours)    None          Medical Decision Making   I am the first provider for this patient. I reviewed the vital signs, available nursing notes, past medical history, past surgical history, family history and social history. Vital Signs: Reviewed the patient's vital signs. Pulse Oximetry Analysis: 100% on Ra     Records Reviewed: Nursing Notes and Old Medical Records    Procedures:  Procedures    ED Course:   4:29 PM Initial assessment performed.  The patients presenting problems have been discussed, and they are in agreement with the care plan formulated and outlined with them. I have encouraged them to ask questions as they arise throughout their visit. Upon arrival the patient in the ED patient was unable to give concise history regarding surgeries or timeline and patient stated \"you can talk to Dr. Nabeel Mcneal" I spoke with dialysis nurse at dialysis clinic who was able to give history of patient. States that she has a long history of noncompliance and missing appointments. She had the peritoneal dialysis catheter placed about a month ago then decided to go to [place] for 3 weeks. She did receive some hemodialysis while in [place]. She returned and was supposed to follow-up at dialysis yesterday for her usual appointment but failed to show. She followed up today but arrived late. This caused the clinic to have to stay open later than their usual clinic hours. They noted during that time that her peritoneal dialysis catheter appeared to be infected and they spoke with her nephrologist who recommended IV antibiotics which they offered her at the clinic. Patient states that she was having abdominal pain and wanted to come to the ER. I spoke with Dr. Roger Berg, he was concerned for infection/peritonitis. States she did not appear septic today and wanted her to get antibiotics (1 g vancomycin 2 g Ceftazidime) and if labs within normal limits and patient is not septic she may be discharged and follow-up with Dr. Soumya Guy post as scheduled tomorrow. Requesting to be discharged. States that she does not want a wait for her antibiotics. Patient did request a CT scan which is already been ordered but when the CT tech arrived to transport her to CT she refused to go. Strongly advised patient to stay for antibiotics that were recommended by her nephrologist.  Patient states her pain has not been addressed and she has been waiting too long.   Offered Tylenol but patient states she will just get her IV antibiotics tomorrow when she goes to the clinic. Stressed the importance of antibiotics. Patient states she is waited long enough. Reiterated that patient has now refused the antibiotics from 2 different facilities. I informed the pt that She needed IV antibiotics for adequate evaluation for her symptoms, diagnosis, that by refusing workup She is at risk for sepsis organ failure permanent disability death. She is awake, alert, She understands her condition and the risks involved in leaving. Pt is clinically aware of their surroundings and able to ask appropriate questions,the nurse present confirms She is not clinically intoxicated and able to make medical decisions. She verbalized knowing the same risks and understood it was recommended that She stay and could also return at any time. She understood both diagnosis, risks and could return at any time. They were both provided with warnings regarding worsening of her condition and were provided instructions to follow up with Roxy Henderson  tomorrow or return to the Emergency Room as soon as possible. This discussion was witnessed by Talia Ward RN. Patient has decided to leave AMA. Patient is competent, understands my concerns for not abiding by recommendations (including death, coma, paralysis, etc). Have attempted to engage friends/family in decision-making. Offered further follow-up here as needed. Patient instructed to follow up with PMD within 24 hours, here SIW. Diagnosis and Disposition       CLINICAL IMPRESSION:    1. Left against medical advice    2. Abdominal pain, generalized    3. Stage 5 chronic kidney disease on chronic dialysis ([facility] [address]    4. Peritoneal dialysis catheter in place [facility])        PLAN:  1. AMA         Please note that this dictation was completed with [facility], the computer voice recognition software. Quite often unanticipated grammatical, syntax, homophones, and other interpretive errors are inadvertently transcribed by the computer software.   Please disregard these errors. Please excuse any errors that have escaped final proofreading.

## 2021-07-13 NOTE — ED NOTES
Patient standing at door asking for RN and provider. ARNOLDO Irizarry and this RN into room. Radiology tech into room to take patient to CT scan. Patient refusing CT scan. Patient upset about wait time, not getting ABX quick enough, and no pain medication given. Patient refusing to stay for any more treatment. Patient informed of possible risks of leaving by provider. Patient signed AMA paper. Ambulatory out of ER with steady gait. AOX4. Easy WOB. NAD.

## 2021-07-13 NOTE — ED NOTES
Report received from [place], [facility]. Patien tin NAD. VSS. Easy WOB. AOX4. Bed low and locked. Call bell within reach.

## 2021-07-16 LAB
BACTERIA SPEC CULT: ABNORMAL
GRAM STN SPEC: ABNORMAL
GRAM STN SPEC: ABNORMAL
SERVICE CMNT-IMP: ABNORMAL

## 2021-07-16 NOTE — CALL BACK NOTE
3:20 PM   07/16/21      Patient seen recently for peritoneal dialysis site infection. Patient was supposed to receive IV antibiotics but left AGAINST MEDICAL ADVICE before being able to do so. She did have a wound culture from her dialysis center prior to arrival that day as well.   She did follow-up with them the next day and and they did give her IV antibiotics that were intended they reviewed the labs as well and she is being treated by her nephrologist therefore will not add any new antibiotics on      Kassy Adams PA-C

## 2021-07-17 LAB
BACTERIA SPEC CULT: NORMAL
GRAM STN SPEC: NORMAL
GRAM STN SPEC: NORMAL
SERVICE CMNT-IMP: NORMAL

## 2021-07-19 LAB
BACTERIA SPEC CULT: NORMAL
BACTERIA SPEC CULT: NORMAL
SERVICE CMNT-IMP: NORMAL
SERVICE CMNT-IMP: NORMAL

## 2022-03-11 ENCOUNTER — HOSPITAL ENCOUNTER (OUTPATIENT)
Dept: PREADMISSION TESTING | Age: 27
Discharge: HOME | End: 2022-03-11
Payer: MEDICARE

## 2022-03-11 ENCOUNTER — TRANSCRIBE ORDER (OUTPATIENT)
Dept: REGISTRATION | Age: 27
End: 2022-03-11

## 2022-03-11 ENCOUNTER — HOSPITAL ENCOUNTER (OUTPATIENT)
Dept: PREADMISSION TESTING | Age: 27
Discharge: HOME | End: 2022-03-11

## 2022-03-11 VITALS — HEIGHT: 66 IN | BODY MASS INDEX: 26.52 KG/M2 | WEIGHT: 165 LBS

## 2022-03-11 DIAGNOSIS — Z99.2 ENCOUNTER FOR EXTRACORPOREAL DIALYSIS (HCC): Primary | ICD-10-CM

## 2022-03-11 DIAGNOSIS — Z99.2 ENCOUNTER FOR EXTRACORPOREAL DIALYSIS (HCC): ICD-10-CM

## 2022-03-11 LAB
ANION GAP SERPL CALC-SCNC: 10 MMOL/L (ref 3–18)
ATRIAL RATE: 73 BPM
BASOPHILS # BLD: 0 K/UL (ref 0–0.1)
BASOPHILS NFR BLD: 1 % (ref 0–2)
BUN SERPL-MCNC: 74 MG/DL (ref 7–18)
BUN/CREAT SERPL: 4 (ref 12–20)
CALCIUM SERPL-MCNC: 7.4 MG/DL (ref 8.5–10.1)
CALCULATED P AXIS, ECG09: 82 DEGREES
CALCULATED R AXIS, ECG10: 95 DEGREES
CALCULATED T AXIS, ECG11: 31 DEGREES
CHLORIDE SERPL-SCNC: 109 MMOL/L (ref 100–111)
CO2 SERPL-SCNC: 22 MMOL/L (ref 21–32)
CREAT SERPL-MCNC: 18.8 MG/DL (ref 0.6–1.3)
DIAGNOSIS, 93000: NORMAL
DIFFERENTIAL METHOD BLD: ABNORMAL
EOSINOPHIL # BLD: 0.2 K/UL (ref 0–0.4)
EOSINOPHIL NFR BLD: 5 % (ref 0–5)
ERYTHROCYTE [DISTWIDTH] IN BLOOD BY AUTOMATED COUNT: 17.8 % (ref 11.6–14.5)
GLUCOSE SERPL-MCNC: 83 MG/DL (ref 74–99)
HCT VFR BLD AUTO: 31 % (ref 35–45)
HGB BLD-MCNC: 9.3 G/DL (ref 12–16)
IMM GRANULOCYTES # BLD AUTO: 0 K/UL (ref 0–0.04)
IMM GRANULOCYTES NFR BLD AUTO: 0 % (ref 0–0.5)
LYMPHOCYTES # BLD: 0.9 K/UL (ref 0.9–3.6)
LYMPHOCYTES NFR BLD: 17 % (ref 21–52)
MCH RBC QN AUTO: 26.3 PG (ref 24–34)
MCHC RBC AUTO-ENTMCNC: 30 G/DL (ref 31–37)
MCV RBC AUTO: 87.6 FL (ref 78–100)
MONOCYTES # BLD: 0.8 K/UL (ref 0.05–1.2)
MONOCYTES NFR BLD: 15 % (ref 3–10)
NEUTS SEG # BLD: 3.3 K/UL (ref 1.8–8)
NEUTS SEG NFR BLD: 62 % (ref 40–73)
NRBC # BLD: 0 K/UL (ref 0–0.01)
NRBC BLD-RTO: 0 PER 100 WBC
P-R INTERVAL, ECG05: 158 MS
PLATELET # BLD AUTO: 111 K/UL (ref 135–420)
POTASSIUM SERPL-SCNC: 4.9 MMOL/L (ref 3.5–5.5)
Q-T INTERVAL, ECG07: 438 MS
QRS DURATION, ECG06: 84 MS
QTC CALCULATION (BEZET), ECG08: 482 MS
RBC # BLD AUTO: 3.54 M/UL (ref 4.2–5.3)
SODIUM SERPL-SCNC: 141 MMOL/L (ref 136–145)
VENTRICULAR RATE, ECG03: 73 BPM
WBC # BLD AUTO: 5.3 K/UL (ref 4.6–13.2)

## 2022-03-11 PROCEDURE — 85025 COMPLETE CBC W/AUTO DIFF WBC: CPT

## 2022-03-11 PROCEDURE — 80048 BASIC METABOLIC PNL TOTAL CA: CPT

## 2022-03-11 PROCEDURE — 36415 COLL VENOUS BLD VENIPUNCTURE: CPT

## 2022-03-11 PROCEDURE — 93005 ELECTROCARDIOGRAM TRACING: CPT

## 2022-03-11 RX ORDER — CALCITRIOL 0.5 UG/1
0.5 CAPSULE ORAL DAILY
Status: ON HOLD | COMMUNITY
Start: 2021-08-12 | End: 2022-03-24 | Stop reason: SDUPTHER

## 2022-03-11 RX ORDER — CALCIUM CARBONATE 500(1250)
3 TABLET ORAL
COMMUNITY
Start: 2022-02-22

## 2022-03-11 RX ORDER — SODIUM CHLORIDE 9 MG/ML
50 INJECTION, SOLUTION INTRAVENOUS CONTINUOUS
Status: CANCELLED | OUTPATIENT
Start: 2022-03-11

## 2022-03-11 RX ORDER — CEFAZOLIN SODIUM/WATER 2 G/20 ML
2 SYRINGE (ML) INTRAVENOUS ONCE
Status: CANCELLED | OUTPATIENT
Start: 2022-03-11 | End: 2022-03-11

## 2022-03-11 NOTE — PERIOP NOTES
PAT - SURGICAL PRE-ADMISSION INSTRUCTIONS    NAME:  Marylou Springer                                                          TODAY'S DATE:  3/11/2022    SURGERY DATE:  3/18/2022                                  SURGERY ARRIVAL TIME:   tba    1. Do NOT eat or drink anything, including candy or gum, after MIDNIGHT on 3-18 , unless you have specific instructions from your Surgeon or Anesthesia Provider to do so. 2. No smoking 24 hours before surgery. 3. No alcohol 24 hours prior to the day of surgery. 4. No recreational drugs for one week prior to the day of surgery. 5. Leave all valuables, including money/purse, at home. 6. Remove all jewelry, nail polish, makeup (including mascara); no lotions, powders, deodorant, or perfume/cologne/after shave. 7. Glasses/Contact lenses and Dentures may be worn to the hospital.  They will be removed prior to surgery. 8. Call your doctor if symptoms of a cold or illness develop within 24 ours prior to surgery. 9. AN ADULT MUST DRIVE YOU HOME AFTER OUTPATIENT SURGERY. 10. If you are having an OUTPATIENT procedure, please make arrangements for a responsible adult to be with you for 24 hours after your surgery. 11. If you are admitted to the hospital, you will be assigned to a bed after surgery is complete. Normally a family member will not be able to see you until you are in your assigned bed. 12. Visitation Restrictions Explained. Special Instructions:  Covid Test not needed, covid vaccine in Immunizations tab, Quarantine requirements discussed  NONE. Patient Prep:    use CHG solution     These surgical instructions were(visit/phone call).    reviewed with pt and mother during the PAT phone call     On way today for labs and EKG

## 2022-03-15 NOTE — PERIOP NOTES
Anesthesia comments re: Eden Mora EKG faxed to [phone] [facility] and [facility] notified.

## 2022-03-22 ENCOUNTER — HOSPITAL ENCOUNTER (INPATIENT)
Age: 27
LOS: 2 days | Discharge: HOME | DRG: 682 | End: 2022-03-24
Attending: STUDENT IN AN ORGANIZED HEALTH CARE EDUCATION/TRAINING PROGRAM | Admitting: HOSPITALIST
Payer: MEDICARE

## 2022-03-22 ENCOUNTER — APPOINTMENT (OUTPATIENT)
Dept: GENERAL RADIOLOGY | Age: 27
DRG: 682 | End: 2022-03-22
Attending: PHYSICIAN ASSISTANT
Payer: MEDICARE

## 2022-03-22 ENCOUNTER — APPOINTMENT (OUTPATIENT)
Dept: CT IMAGING | Age: 27
DRG: 682 | End: 2022-03-22
Attending: PHYSICIAN ASSISTANT
Payer: MEDICARE

## 2022-03-22 DIAGNOSIS — G93.40 ENCEPHALOPATHY ACUTE: Primary | ICD-10-CM

## 2022-03-22 DIAGNOSIS — Z99.2 ESRD ON PERITONEAL DIALYSIS (HCC): ICD-10-CM

## 2022-03-22 DIAGNOSIS — G89.4 CHRONIC PAIN DISORDER: ICD-10-CM

## 2022-03-22 DIAGNOSIS — R94.31 ABNORMAL EKG: ICD-10-CM

## 2022-03-22 DIAGNOSIS — N18.6 ESRD ON PERITONEAL DIALYSIS (HCC): ICD-10-CM

## 2022-03-22 DIAGNOSIS — E87.5 HYPERKALEMIA: ICD-10-CM

## 2022-03-22 LAB
ALBUMIN SERPL-MCNC: 2.6 G/DL (ref 3.4–5)
ALBUMIN/GLOB SERPL: 0.8 {RATIO} (ref 0.8–1.7)
ALP SERPL-CCNC: 48 U/L (ref 45–117)
ALT SERPL-CCNC: 12 U/L (ref 13–56)
AMPHET UR QL SCN: NEGATIVE
ANION GAP SERPL CALC-SCNC: 11 MMOL/L (ref 3–18)
APPEARANCE UR: CLEAR
AST SERPL-CCNC: 10 U/L (ref 10–38)
BACTERIA URNS QL MICRO: NEGATIVE /HPF
BARBITURATES UR QL SCN: NEGATIVE
BASOPHILS # BLD: 0 K/UL (ref 0–0.1)
BASOPHILS NFR BLD: 0 % (ref 0–2)
BENZODIAZ UR QL: NEGATIVE
BILIRUB DIRECT SERPL-MCNC: 0.1 MG/DL (ref 0–0.2)
BILIRUB SERPL-MCNC: 0.3 MG/DL (ref 0.2–1)
BILIRUB UR QL: NEGATIVE
BUN SERPL-MCNC: 88 MG/DL (ref 7–18)
BUN/CREAT SERPL: 4 (ref 12–20)
CALCIUM SERPL-MCNC: 6.4 MG/DL (ref 8.5–10.1)
CANNABINOIDS UR QL SCN: POSITIVE
CHLORIDE SERPL-SCNC: 115 MMOL/L (ref 100–111)
CO2 SERPL-SCNC: 17 MMOL/L (ref 21–32)
COCAINE UR QL SCN: NEGATIVE
COLOR UR: YELLOW
CREAT SERPL-MCNC: 20.2 MG/DL (ref 0.6–1.3)
DIFFERENTIAL METHOD BLD: ABNORMAL
EOSINOPHIL # BLD: 0.3 K/UL (ref 0–0.4)
EOSINOPHIL NFR BLD: 4 % (ref 0–5)
EPITH CASTS URNS QL MICRO: NORMAL /LPF (ref 0–5)
ERYTHROCYTE [DISTWIDTH] IN BLOOD BY AUTOMATED COUNT: 15.9 % (ref 11.6–14.5)
ETHANOL SERPL-MCNC: 6 MG/DL (ref 0–3)
GLOBULIN SER CALC-MCNC: 3.3 G/DL (ref 2–4)
GLUCOSE BLD STRIP.AUTO-MCNC: 89 MG/DL (ref 70–110)
GLUCOSE SERPL-MCNC: 86 MG/DL (ref 74–99)
GLUCOSE UR STRIP.AUTO-MCNC: 100 MG/DL
HCG SERPL QL: NEGATIVE
HCT VFR BLD AUTO: 27.7 % (ref 35–45)
HDSCOM,HDSCOM: ABNORMAL
HGB BLD-MCNC: 8.5 G/DL (ref 12–16)
HGB UR QL STRIP: ABNORMAL
IMM GRANULOCYTES # BLD AUTO: 0.1 K/UL (ref 0–0.04)
IMM GRANULOCYTES NFR BLD AUTO: 1 % (ref 0–0.5)
KETONES UR QL STRIP.AUTO: NEGATIVE MG/DL
LEUKOCYTE ESTERASE UR QL STRIP.AUTO: NEGATIVE
LIPASE SERPL-CCNC: 243 U/L (ref 73–393)
LYMPHOCYTES # BLD: 0.9 K/UL (ref 0.9–3.6)
LYMPHOCYTES NFR BLD: 14 % (ref 21–52)
MAGNESIUM SERPL-MCNC: 2.2 MG/DL (ref 1.6–2.6)
MCH RBC QN AUTO: 26.7 PG (ref 24–34)
MCHC RBC AUTO-ENTMCNC: 30.7 G/DL (ref 31–37)
MCV RBC AUTO: 87.1 FL (ref 78–100)
METHADONE UR QL: NEGATIVE
MONOCYTES # BLD: 0.5 K/UL (ref 0.05–1.2)
MONOCYTES NFR BLD: 8 % (ref 3–10)
NEUTS SEG # BLD: 4.7 K/UL (ref 1.8–8)
NEUTS SEG NFR BLD: 73 % (ref 40–73)
NITRITE UR QL STRIP.AUTO: NEGATIVE
NRBC # BLD: 0 K/UL (ref 0–0.01)
NRBC BLD-RTO: 0 PER 100 WBC
OPIATES UR QL: NEGATIVE
PCP UR QL: NEGATIVE
PH UR STRIP: 7.5 [PH] (ref 5–8)
PLATELET # BLD AUTO: 98 K/UL (ref 135–420)
PLATELET COMMENTS,PCOM: ABNORMAL
POTASSIUM SERPL-SCNC: 5.8 MMOL/L (ref 3.5–5.5)
PROT SERPL-MCNC: 5.9 G/DL (ref 6.4–8.2)
PROT UR STRIP-MCNC: 300 MG/DL
RBC # BLD AUTO: 3.18 M/UL (ref 4.2–5.3)
RBC #/AREA URNS HPF: NORMAL /HPF (ref 0–5)
RBC MORPH BLD: ABNORMAL
SODIUM SERPL-SCNC: 143 MMOL/L (ref 136–145)
SP GR UR REFRACTOMETRY: 1.01 (ref 1–1.03)
TROPONIN-HIGH SENSITIVITY: 8 NG/L (ref 0–54)
UROBILINOGEN UR QL STRIP.AUTO: 0.2 EU/DL (ref 0.2–1)
WBC # BLD AUTO: 6.5 K/UL (ref 4.6–13.2)
WBC URNS QL MICRO: NORMAL /HPF (ref 0–5)

## 2022-03-22 PROCEDURE — 93005 ELECTROCARDIOGRAM TRACING: CPT

## 2022-03-22 PROCEDURE — 74011000258 HC RX REV CODE- 258: Performed by: HOSPITALIST

## 2022-03-22 PROCEDURE — 99285 EMERGENCY DEPT VISIT HI MDM: CPT

## 2022-03-22 PROCEDURE — 82962 GLUCOSE BLOOD TEST: CPT

## 2022-03-22 PROCEDURE — 74011000636 HC RX REV CODE- 636: Performed by: STUDENT IN AN ORGANIZED HEALTH CARE EDUCATION/TRAINING PROGRAM

## 2022-03-22 PROCEDURE — 71260 CT THORAX DX C+: CPT

## 2022-03-22 PROCEDURE — 87040 BLOOD CULTURE FOR BACTERIA: CPT

## 2022-03-22 PROCEDURE — 85025 COMPLETE CBC W/AUTO DIFF WBC: CPT

## 2022-03-22 PROCEDURE — 84484 ASSAY OF TROPONIN QUANT: CPT

## 2022-03-22 PROCEDURE — 80048 BASIC METABOLIC PNL TOTAL CA: CPT

## 2022-03-22 PROCEDURE — 74011250637 HC RX REV CODE- 250/637: Performed by: PHYSICIAN ASSISTANT

## 2022-03-22 PROCEDURE — 81001 URINALYSIS AUTO W/SCOPE: CPT

## 2022-03-22 PROCEDURE — 84703 CHORIONIC GONADOTROPIN ASSAY: CPT

## 2022-03-22 PROCEDURE — 74011250636 HC RX REV CODE- 250/636: Performed by: HOSPITALIST

## 2022-03-22 PROCEDURE — 65660000000 HC RM CCU STEPDOWN

## 2022-03-22 PROCEDURE — 80076 HEPATIC FUNCTION PANEL: CPT

## 2022-03-22 PROCEDURE — 80307 DRUG TEST PRSMV CHEM ANLYZR: CPT

## 2022-03-22 PROCEDURE — 71045 X-RAY EXAM CHEST 1 VIEW: CPT

## 2022-03-22 PROCEDURE — 83735 ASSAY OF MAGNESIUM: CPT

## 2022-03-22 PROCEDURE — 82077 ASSAY SPEC XCP UR&BREATH IA: CPT

## 2022-03-22 PROCEDURE — 74011000250 HC RX REV CODE- 250: Performed by: HOSPITALIST

## 2022-03-22 PROCEDURE — 83690 ASSAY OF LIPASE: CPT

## 2022-03-22 PROCEDURE — 70450 CT HEAD/BRAIN W/O DYE: CPT

## 2022-03-22 PROCEDURE — 74011250637 HC RX REV CODE- 250/637: Performed by: HOSPITALIST

## 2022-03-22 RX ORDER — HYDROCODONE BITARTRATE AND ACETAMINOPHEN 5; 325 MG/1; MG/1
1 TABLET ORAL
Status: COMPLETED | OUTPATIENT
Start: 2022-03-22 | End: 2022-03-22

## 2022-03-22 RX ORDER — ACETAMINOPHEN 650 MG/1
650 SUPPOSITORY RECTAL
Status: DISCONTINUED | OUTPATIENT
Start: 2022-03-22 | End: 2022-03-24 | Stop reason: HOSPADM

## 2022-03-22 RX ORDER — HYDROXYCHLOROQUINE SULFATE 200 MG/1
200 TABLET, FILM COATED ORAL
Status: DISCONTINUED | OUTPATIENT
Start: 2022-03-22 | End: 2022-03-24 | Stop reason: HOSPADM

## 2022-03-22 RX ORDER — LABETALOL 200 MG/1
200 TABLET, FILM COATED ORAL 3 TIMES DAILY
Status: DISCONTINUED | OUTPATIENT
Start: 2022-03-22 | End: 2022-03-24 | Stop reason: HOSPADM

## 2022-03-22 RX ORDER — PROMETHAZINE HYDROCHLORIDE 25 MG/1
12.5 TABLET ORAL
Status: DISCONTINUED | OUTPATIENT
Start: 2022-03-22 | End: 2022-03-24 | Stop reason: HOSPADM

## 2022-03-22 RX ORDER — DIVALPROEX SODIUM 500 MG/1
500 TABLET, EXTENDED RELEASE ORAL 2 TIMES DAILY
Status: DISCONTINUED | OUTPATIENT
Start: 2022-03-22 | End: 2022-03-23

## 2022-03-22 RX ORDER — FACIAL-BODY WIPES
10 EACH TOPICAL DAILY PRN
Status: DISCONTINUED | OUTPATIENT
Start: 2022-03-22 | End: 2022-03-24 | Stop reason: HOSPADM

## 2022-03-22 RX ORDER — AMLODIPINE BESYLATE 5 MG/1
5 TABLET ORAL
Status: DISCONTINUED | OUTPATIENT
Start: 2022-03-23 | End: 2022-03-22

## 2022-03-22 RX ORDER — HEPARIN SODIUM 5000 [USP'U]/ML
5000 INJECTION, SOLUTION INTRAVENOUS; SUBCUTANEOUS EVERY 8 HOURS
Status: DISCONTINUED | OUTPATIENT
Start: 2022-03-22 | End: 2022-03-24 | Stop reason: HOSPADM

## 2022-03-22 RX ORDER — ACETAMINOPHEN 325 MG/1
650 TABLET ORAL
Status: DISCONTINUED | OUTPATIENT
Start: 2022-03-22 | End: 2022-03-22

## 2022-03-22 RX ORDER — OXYCODONE AND ACETAMINOPHEN 5; 325 MG/1; MG/1
1 TABLET ORAL
Status: DISCONTINUED | OUTPATIENT
Start: 2022-03-22 | End: 2022-03-24 | Stop reason: HOSPADM

## 2022-03-22 RX ORDER — SODIUM CHLORIDE 0.9 % (FLUSH) 0.9 %
5-40 SYRINGE (ML) INJECTION AS NEEDED
Status: DISCONTINUED | OUTPATIENT
Start: 2022-03-22 | End: 2022-03-24 | Stop reason: HOSPADM

## 2022-03-22 RX ORDER — ALBUTEROL SULFATE 90 UG/1
1 AEROSOL, METERED RESPIRATORY (INHALATION) AS NEEDED
Status: DISCONTINUED | OUTPATIENT
Start: 2022-03-22 | End: 2022-03-24 | Stop reason: HOSPADM

## 2022-03-22 RX ORDER — CALCIUM GLUCONATE 20 MG/ML
1 INJECTION, SOLUTION INTRAVENOUS ONCE
Status: COMPLETED | OUTPATIENT
Start: 2022-03-22 | End: 2022-03-23

## 2022-03-22 RX ORDER — FUROSEMIDE 10 MG/ML
20 INJECTION INTRAMUSCULAR; INTRAVENOUS ONCE
Status: COMPLETED | OUTPATIENT
Start: 2022-03-22 | End: 2022-03-22

## 2022-03-22 RX ORDER — HYDROCODONE BITARTRATE AND ACETAMINOPHEN 5; 325 MG/1; MG/1
1 TABLET ORAL
Status: DISPENSED | OUTPATIENT
Start: 2022-03-22 | End: 2022-03-23

## 2022-03-22 RX ORDER — LEVOTHYROXINE SODIUM 100 UG/1
100 TABLET ORAL
Status: DISCONTINUED | OUTPATIENT
Start: 2022-03-23 | End: 2022-03-24 | Stop reason: HOSPADM

## 2022-03-22 RX ORDER — PANTOPRAZOLE SODIUM 40 MG/1
40 TABLET, DELAYED RELEASE ORAL 2 TIMES DAILY
Status: DISCONTINUED | OUTPATIENT
Start: 2022-03-22 | End: 2022-03-24 | Stop reason: HOSPADM

## 2022-03-22 RX ORDER — SODIUM CHLORIDE 0.9 % (FLUSH) 0.9 %
5-40 SYRINGE (ML) INJECTION EVERY 8 HOURS
Status: DISCONTINUED | OUTPATIENT
Start: 2022-03-22 | End: 2022-03-24 | Stop reason: HOSPADM

## 2022-03-22 RX ORDER — CALCITRIOL 0.25 UG/1
1 CAPSULE ORAL DAILY
Status: DISCONTINUED | OUTPATIENT
Start: 2022-03-23 | End: 2022-03-24 | Stop reason: HOSPADM

## 2022-03-22 RX ORDER — CALCITRIOL 0.25 UG/1
0.5 CAPSULE ORAL DAILY
Status: DISCONTINUED | OUTPATIENT
Start: 2022-03-23 | End: 2022-03-24 | Stop reason: HOSPADM

## 2022-03-22 RX ORDER — AMLODIPINE BESYLATE 5 MG/1
5 TABLET ORAL
Status: DISCONTINUED | OUTPATIENT
Start: 2022-03-22 | End: 2022-03-23

## 2022-03-22 RX ORDER — ONDANSETRON 2 MG/ML
4 INJECTION INTRAMUSCULAR; INTRAVENOUS
Status: DISCONTINUED | OUTPATIENT
Start: 2022-03-22 | End: 2022-03-22

## 2022-03-22 RX ORDER — ACETAMINOPHEN 325 MG/1
650 TABLET ORAL
Status: DISCONTINUED | OUTPATIENT
Start: 2022-03-22 | End: 2022-03-24 | Stop reason: HOSPADM

## 2022-03-22 RX ADMIN — HEPARIN SODIUM 5000 UNITS: 5000 INJECTION INTRAVENOUS; SUBCUTANEOUS at 23:40

## 2022-03-22 RX ADMIN — HYDROXYCHLOROQUINE SULFATE 200 MG: 200 TABLET ORAL at 23:40

## 2022-03-22 RX ADMIN — PANTOPRAZOLE SODIUM 40 MG: 40 TABLET, DELAYED RELEASE ORAL at 20:23

## 2022-03-22 RX ADMIN — PIPERACILLIN AND TAZOBACTAM 3.38 G: 3; .375 INJECTION, POWDER, LYOPHILIZED, FOR SOLUTION INTRAVENOUS at 20:24

## 2022-03-22 RX ADMIN — HYDROCODONE BITARTRATE AND ACETAMINOPHEN 1 TABLET: 5; 325 TABLET ORAL at 17:46

## 2022-03-22 RX ADMIN — AMLODIPINE BESYLATE 5 MG: 5 TABLET ORAL at 20:24

## 2022-03-22 RX ADMIN — LABETALOL HYDROCHLORIDE 200 MG: 200 TABLET, FILM COATED ORAL at 23:40

## 2022-03-22 RX ADMIN — OXYCODONE AND ACETAMINOPHEN 1 TABLET: 5; 325 TABLET ORAL at 20:23

## 2022-03-22 RX ADMIN — FUROSEMIDE 20 MG: 10 INJECTION, SOLUTION INTRAMUSCULAR; INTRAVENOUS at 20:30

## 2022-03-22 RX ADMIN — DIVALPROEX SODIUM 500 MG: 500 TABLET, EXTENDED RELEASE ORAL at 20:23

## 2022-03-22 RX ADMIN — IOPAMIDOL 100 ML: 612 INJECTION, SOLUTION INTRAVENOUS at 16:51

## 2022-03-22 RX ADMIN — SODIUM CHLORIDE, PRESERVATIVE FREE 10 ML: 5 INJECTION INTRAVENOUS at 23:41

## 2022-03-22 NOTE — PROGRESS NOTES
Orders for PD placed. Do PD with 2 liter fill. Dwell for 3:30 hr. Drain time 20 minutes. Fill time 10 minutes.
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
## 2022-03-22 NOTE — ED NOTES
TRANSFER - OUT REPORT:    Verbal report given to Tele nurse (name) on Sisi Bergeron  being transferred to Telemetry (unit) for routine progression of care       Report consisted of patients Situation, Background, Assessment and   Recommendations(SBAR). Information from the following report(s) SBAR and ED Summary was reviewed with the receiving nurse. Lines:   Peripheral IV 03/22/22 Right Antecubital (Active)        Opportunity for questions and clarification was provided.       Patient transported with:   Monitor

## 2022-03-22 NOTE — PROGRESS NOTES
Pharmacy Dosing Services:     Pharmacist Renal Dosing Progress Note for Vickie Bagley  Physician : Dr Gilma Edouard    The following medication: Zosyn  was automatically dose-adjusted per [facility] P&T Committee Protocol, with respect to renal function. Consult provided for this   32 y.o. , female , for the indication of intrabdominal infection    Pt Weight:   Wt Readings from Last 1 Encounters:   03/22/22 74.8 kg (165 lb)         Previous Regimen Zosyn 3.375 gm Q6H   Serum Creatinine Lab Results   Component Value Date/Time    Creatinine 20.20 (H) 03/22/2022 03:29 PM       Creatinine Clearance Estimated Creatinine Clearance: 4.4 mL/min (A) (based on SCr of 20.2 mg/dL (H)). BUN Lab Results   Component Value Date/Time    BUN 88 (H) 03/22/2022 03:29 PM         Dosage changed to:  Zosyn 3.375gm Q12H    Additional notes:      Pharmacy to continue to monitor patient daily. Will make dosage adjustments based upon changing renal function. Signed Florencia Pineda [facility].  Contact information:

## 2022-03-23 ENCOUNTER — APPOINTMENT (OUTPATIENT)
Dept: MRI IMAGING | Age: 27
DRG: 682 | End: 2022-03-23
Attending: HOSPITALIST
Payer: MEDICARE

## 2022-03-23 ENCOUNTER — APPOINTMENT (OUTPATIENT)
Dept: NON INVASIVE DIAGNOSTICS | Age: 27
DRG: 682 | End: 2022-03-23
Attending: HOSPITALIST
Payer: MEDICARE

## 2022-03-23 LAB
AMPHET UR QL SCN: NEGATIVE
ANION GAP SERPL CALC-SCNC: 10 MMOL/L (ref 3–18)
BARBITURATES UR QL SCN: NEGATIVE
BASOPHILS # BLD: 0 K/UL (ref 0–0.1)
BASOPHILS NFR BLD: 1 % (ref 0–2)
BENZODIAZ UR QL: NEGATIVE
BUN SERPL-MCNC: 88 MG/DL (ref 7–18)
BUN/CREAT SERPL: 5 (ref 12–20)
CALCIUM SERPL-MCNC: 6.8 MG/DL (ref 8.5–10.1)
CANNABINOIDS UR QL SCN: POSITIVE
CHLORIDE SERPL-SCNC: 114 MMOL/L (ref 100–111)
CO2 SERPL-SCNC: 19 MMOL/L (ref 21–32)
COCAINE UR QL SCN: NEGATIVE
CREAT SERPL-MCNC: 19.5 MG/DL (ref 0.6–1.3)
DIFFERENTIAL METHOD BLD: ABNORMAL
ECHO AO ROOT DIAM: 3.2 CM
ECHO AO ROOT INDEX: 1.74 CM/M2
ECHO AV AREA PEAK VELOCITY: 2.2 CM2
ECHO AV AREA VTI: 2.1 CM2
ECHO AV AREA/BSA PEAK VELOCITY: 1.2 CM2/M2
ECHO AV AREA/BSA VTI: 1.1 CM2/M2
ECHO AV MEAN GRADIENT: 7 MMHG
ECHO AV MEAN VELOCITY: 1.3 M/S
ECHO AV PEAK GRADIENT: 13 MMHG
ECHO AV PEAK VELOCITY: 1.8 M/S
ECHO AV VELOCITY RATIO: 0.56
ECHO AV VTI: 42.6 CM
ECHO EST RA PRESSURE: 3 MMHG
ECHO LA DIAMETER INDEX: 2.01 CM/M2
ECHO LA DIAMETER: 3.7 CM
ECHO LA TO AORTIC ROOT RATIO: 1.16
ECHO LA VOL 2C: 67 ML (ref 22–52)
ECHO LA VOL 4C: 57 ML (ref 22–52)
ECHO LA VOL BP: 62 ML (ref 22–52)
ECHO LA VOL/BSA BIPLANE: 34 ML/M2 (ref 16–34)
ECHO LA VOLUME AREA LENGTH: 64 ML
ECHO LA VOLUME INDEX A2C: 36 ML/M2 (ref 16–34)
ECHO LA VOLUME INDEX A4C: 31 ML/M2 (ref 16–34)
ECHO LA VOLUME INDEX AREA LENGTH: 35 ML/M2 (ref 16–34)
ECHO LV E' LATERAL VELOCITY: 10 CM/S
ECHO LV E' SEPTAL VELOCITY: 9 CM/S
ECHO LV EDV A2C: 124 ML
ECHO LV EDV A4C: 181 ML
ECHO LV EDV BP: 152 ML (ref 56–104)
ECHO LV EDV INDEX A4C: 98 ML/M2
ECHO LV EDV INDEX BP: 83 ML/M2
ECHO LV EDV NDEX A2C: 67 ML/M2
ECHO LV EJECTION FRACTION A2C: 57 %
ECHO LV EJECTION FRACTION A4C: 63 %
ECHO LV EJECTION FRACTION BIPLANE: 59 % (ref 55–100)
ECHO LV ESV A2C: 54 ML
ECHO LV ESV A4C: 67 ML
ECHO LV ESV BP: 63 ML (ref 19–49)
ECHO LV ESV INDEX A2C: 29 ML/M2
ECHO LV ESV INDEX A4C: 36 ML/M2
ECHO LV ESV INDEX BP: 34 ML/M2
ECHO LV FRACTIONAL SHORTENING: 25 % (ref 28–44)
ECHO LV INTERNAL DIMENSION DIASTOLE INDEX: 3.26 CM/M2
ECHO LV INTERNAL DIMENSION DIASTOLIC: 6 CM (ref 3.9–5.3)
ECHO LV INTERNAL DIMENSION SYSTOLIC INDEX: 2.45 CM/M2
ECHO LV INTERNAL DIMENSION SYSTOLIC: 4.5 CM
ECHO LV IVSD: 1.1 CM (ref 0.6–0.9)
ECHO LV MASS 2D: 296.6 G (ref 67–162)
ECHO LV MASS INDEX 2D: 161.2 G/M2 (ref 43–95)
ECHO LV POSTERIOR WALL DIASTOLIC: 1.2 CM (ref 0.6–0.9)
ECHO LV RELATIVE WALL THICKNESS RATIO: 0.4
ECHO LVOT AREA: 4.2 CM2
ECHO LVOT AV VTI INDEX: 0.5
ECHO LVOT DIAM: 2.3 CM
ECHO LVOT MEAN GRADIENT: 2 MMHG
ECHO LVOT PEAK GRADIENT: 4 MMHG
ECHO LVOT PEAK VELOCITY: 1 M/S
ECHO LVOT STROKE VOLUME INDEX: 47.8 ML/M2
ECHO LVOT SV: 88 ML
ECHO LVOT VTI: 21.2 CM
ECHO MV A VELOCITY: 0.96 M/S
ECHO MV E DECELERATION TIME (DT): 260.1 MS
ECHO MV E VELOCITY: 0.8 M/S
ECHO MV E/A RATIO: 0.83
ECHO MV E/E' LATERAL: 8
ECHO MV E/E' RATIO (AVERAGED): 8.44
ECHO MV E/E' SEPTAL: 8.89
ECHO PULMONARY ARTERY END DIASTOLIC PRESSURE: 2 MMHG
ECHO PV REGURGITANT MAX VELOCITY: 0.6 M/S
ECHO RIGHT VENTRICULAR SYSTOLIC PRESSURE (RVSP): 20 MMHG
ECHO RV FREE WALL PEAK S': 16 CM/S
ECHO RV INTERNAL DIMENSION: 3.2 CM
ECHO TV REGURGITANT MAX VELOCITY: 2.05 M/S
ECHO TV REGURGITANT PEAK GRADIENT: 17 MMHG
EOSINOPHIL # BLD: 0.3 K/UL (ref 0–0.4)
EOSINOPHIL NFR BLD: 5 % (ref 0–5)
ERYTHROCYTE [DISTWIDTH] IN BLOOD BY AUTOMATED COUNT: 16.4 % (ref 11.6–14.5)
GLUCOSE SERPL-MCNC: 97 MG/DL (ref 74–99)
HCT VFR BLD AUTO: 30.7 % (ref 35–45)
HDSCOM,HDSCOM: ABNORMAL
HGB BLD-MCNC: 9 G/DL (ref 12–16)
IMM GRANULOCYTES # BLD AUTO: 0 K/UL (ref 0–0.04)
IMM GRANULOCYTES NFR BLD AUTO: 0 % (ref 0–0.5)
LYMPHOCYTES # BLD: 1.1 K/UL (ref 0.9–3.6)
LYMPHOCYTES NFR BLD: 18 % (ref 21–52)
MCH RBC QN AUTO: 26.2 PG (ref 24–34)
MCHC RBC AUTO-ENTMCNC: 29.3 G/DL (ref 31–37)
MCV RBC AUTO: 89.2 FL (ref 78–100)
METHADONE UR QL: NEGATIVE
MONOCYTES # BLD: 0.6 K/UL (ref 0.05–1.2)
MONOCYTES NFR BLD: 10 % (ref 3–10)
NEUTS SEG # BLD: 4 K/UL (ref 1.8–8)
NEUTS SEG NFR BLD: 66 % (ref 40–73)
NRBC # BLD: 0 K/UL (ref 0–0.01)
NRBC BLD-RTO: 0 PER 100 WBC
OPIATES UR QL: NEGATIVE
PCP UR QL: NEGATIVE
PLATELET # BLD AUTO: 107 K/UL (ref 135–420)
POTASSIUM SERPL-SCNC: 5.4 MMOL/L (ref 3.5–5.5)
RBC # BLD AUTO: 3.44 M/UL (ref 4.2–5.3)
SODIUM SERPL-SCNC: 143 MMOL/L (ref 136–145)
VALPROATE SERPL-MCNC: 10 UG/ML (ref 50–100)
WBC # BLD AUTO: 6 K/UL (ref 4.6–13.2)

## 2022-03-23 PROCEDURE — 85025 COMPLETE CBC W/AUTO DIFF WBC: CPT

## 2022-03-23 PROCEDURE — 80048 BASIC METABOLIC PNL TOTAL CA: CPT

## 2022-03-23 PROCEDURE — 70551 MRI BRAIN STEM W/O DYE: CPT

## 2022-03-23 PROCEDURE — 74011250637 HC RX REV CODE- 250/637: Performed by: STUDENT IN AN ORGANIZED HEALTH CARE EDUCATION/TRAINING PROGRAM

## 2022-03-23 PROCEDURE — 74011250636 HC RX REV CODE- 250/636: Performed by: INTERNAL MEDICINE

## 2022-03-23 PROCEDURE — A4722 DIALYS SOL FLD VOL > 1999CC: HCPCS | Performed by: INTERNAL MEDICINE

## 2022-03-23 PROCEDURE — 93306 TTE W/DOPPLER COMPLETE: CPT

## 2022-03-23 PROCEDURE — 74011000250 HC RX REV CODE- 250: Performed by: HOSPITALIST

## 2022-03-23 PROCEDURE — 74011250636 HC RX REV CODE- 250/636: Performed by: STUDENT IN AN ORGANIZED HEALTH CARE EDUCATION/TRAINING PROGRAM

## 2022-03-23 PROCEDURE — 74011000258 HC RX REV CODE- 258: Performed by: HOSPITALIST

## 2022-03-23 PROCEDURE — 65660000000 HC RM CCU STEPDOWN

## 2022-03-23 PROCEDURE — 74011250636 HC RX REV CODE- 250/636: Performed by: HOSPITALIST

## 2022-03-23 PROCEDURE — 74011250637 HC RX REV CODE- 250/637: Performed by: HOSPITALIST

## 2022-03-23 PROCEDURE — 74011250637 HC RX REV CODE- 250/637: Performed by: INTERNAL MEDICINE

## 2022-03-23 PROCEDURE — 93005 ELECTROCARDIOGRAM TRACING: CPT

## 2022-03-23 PROCEDURE — 36415 COLL VENOUS BLD VENIPUNCTURE: CPT

## 2022-03-23 PROCEDURE — 80164 ASSAY DIPROPYLACETIC ACD TOT: CPT

## 2022-03-23 RX ORDER — DIVALPROEX SODIUM 500 MG/1
500 TABLET, EXTENDED RELEASE ORAL DAILY
Status: DISCONTINUED | OUTPATIENT
Start: 2022-03-24 | End: 2022-03-23

## 2022-03-23 RX ORDER — BUMETANIDE 0.25 MG/ML
2 INJECTION INTRAMUSCULAR; INTRAVENOUS DAILY
Status: DISCONTINUED | OUTPATIENT
Start: 2022-03-24 | End: 2022-03-24 | Stop reason: HOSPADM

## 2022-03-23 RX ORDER — SEVELAMER CARBONATE 800 MG/1
800 TABLET, FILM COATED ORAL
Status: DISCONTINUED | OUTPATIENT
Start: 2022-03-23 | End: 2022-03-24 | Stop reason: HOSPADM

## 2022-03-23 RX ORDER — CALCIUM CARBONATE 500(1250)
500 TABLET ORAL 3 TIMES DAILY
Status: DISCONTINUED | OUTPATIENT
Start: 2022-03-23 | End: 2022-03-24 | Stop reason: HOSPADM

## 2022-03-23 RX ORDER — MORPHINE SULFATE 2 MG/ML
1 INJECTION, SOLUTION INTRAMUSCULAR; INTRAVENOUS
Status: DISCONTINUED | OUTPATIENT
Start: 2022-03-23 | End: 2022-03-24 | Stop reason: HOSPADM

## 2022-03-23 RX ORDER — DIVALPROEX SODIUM 500 MG/1
500 TABLET, EXTENDED RELEASE ORAL DAILY
Status: DISCONTINUED | OUTPATIENT
Start: 2022-03-23 | End: 2022-03-24 | Stop reason: HOSPADM

## 2022-03-23 RX ORDER — AMLODIPINE BESYLATE 5 MG/1
10 TABLET ORAL
Status: DISCONTINUED | OUTPATIENT
Start: 2022-03-23 | End: 2022-03-24 | Stop reason: HOSPADM

## 2022-03-23 RX ORDER — SODIUM BICARBONATE 650 MG/1
650 TABLET ORAL 2 TIMES DAILY
Status: DISCONTINUED | OUTPATIENT
Start: 2022-03-23 | End: 2022-03-24 | Stop reason: HOSPADM

## 2022-03-23 RX ORDER — CALCIUM GLUCONATE 20 MG/ML
1 INJECTION, SOLUTION INTRAVENOUS ONCE
Status: COMPLETED | OUTPATIENT
Start: 2022-03-23 | End: 2022-03-23

## 2022-03-23 RX ORDER — MORPHINE SULFATE 2 MG/ML
1 INJECTION, SOLUTION INTRAMUSCULAR; INTRAVENOUS
Status: DISCONTINUED | OUTPATIENT
Start: 2022-03-23 | End: 2022-03-23

## 2022-03-23 RX ADMIN — DIVALPROEX SODIUM 500 MG: 500 TABLET, EXTENDED RELEASE ORAL at 21:08

## 2022-03-23 RX ADMIN — LABETALOL HYDROCHLORIDE 200 MG: 200 TABLET, FILM COATED ORAL at 21:08

## 2022-03-23 RX ADMIN — HEPARIN SODIUM 5000 UNITS: 5000 INJECTION INTRAVENOUS; SUBCUTANEOUS at 13:43

## 2022-03-23 RX ADMIN — OXYCODONE AND ACETAMINOPHEN 1 TABLET: 5; 325 TABLET ORAL at 02:07

## 2022-03-23 RX ADMIN — LABETALOL HYDROCHLORIDE 200 MG: 200 TABLET, FILM COATED ORAL at 08:15

## 2022-03-23 RX ADMIN — PANTOPRAZOLE SODIUM 40 MG: 40 TABLET, DELAYED RELEASE ORAL at 08:14

## 2022-03-23 RX ADMIN — CALCIUM 500 MG: 500 TABLET ORAL at 21:08

## 2022-03-23 RX ADMIN — MORPHINE SULFATE 1 MG: 2 INJECTION, SOLUTION INTRAMUSCULAR; INTRAVENOUS at 21:08

## 2022-03-23 RX ADMIN — PROMETHAZINE HYDROCHLORIDE 12.5 MG: 25 TABLET ORAL at 09:50

## 2022-03-23 RX ADMIN — SODIUM CHLORIDE, SODIUM LACTATE, CALCIUM CHLORIDE, MAGNESIUM CHLORIDE AND DEXTROSE 2000 ML: 2.5; 538; 448; 18.3; 5.08 INJECTION, SOLUTION INTRAPERITONEAL at 19:20

## 2022-03-23 RX ADMIN — ACETAMINOPHEN 650 MG: 325 TABLET ORAL at 15:41

## 2022-03-23 RX ADMIN — SODIUM BICARBONATE 650 MG: 650 TABLET ORAL at 12:28

## 2022-03-23 RX ADMIN — SODIUM CHLORIDE, SODIUM LACTATE, CALCIUM CHLORIDE, MAGNESIUM CHLORIDE AND DEXTROSE 2000 ML: 2.5; 538; 448; 18.3; 5.08 INJECTION, SOLUTION INTRAPERITONEAL at 10:25

## 2022-03-23 RX ADMIN — CALCITRIOL CAPSULES 0.25 MCG 1 MCG: 0.25 CAPSULE ORAL at 08:15

## 2022-03-23 RX ADMIN — OXYCODONE AND ACETAMINOPHEN 1 TABLET: 5; 325 TABLET ORAL at 08:14

## 2022-03-23 RX ADMIN — DIVALPROEX SODIUM 500 MG: 500 TABLET, EXTENDED RELEASE ORAL at 08:15

## 2022-03-23 RX ADMIN — B-COMPLEX W/ C & FOLIC ACID TAB 1 MG 1 TABLET: 1 TAB at 08:15

## 2022-03-23 RX ADMIN — SEVELAMER CARBONATE 800 MG: 800 TABLET, FILM COATED ORAL at 17:40

## 2022-03-23 RX ADMIN — SODIUM CHLORIDE, SODIUM LACTATE, CALCIUM CHLORIDE, MAGNESIUM CHLORIDE AND DEXTROSE 2000 ML: 2.5; 538; 448; 18.3; 5.08 INJECTION, SOLUTION INTRAPERITONEAL at 03:12

## 2022-03-23 RX ADMIN — CALCITRIOL CAPSULES 0.25 MCG 0.5 MCG: 0.25 CAPSULE ORAL at 08:21

## 2022-03-23 RX ADMIN — SODIUM CHLORIDE, SODIUM LACTATE, CALCIUM CHLORIDE, MAGNESIUM CHLORIDE AND DEXTROSE 2000 ML: 2.5; 538; 448; 18.3; 5.08 INJECTION, SOLUTION INTRAPERITONEAL at 14:52

## 2022-03-23 RX ADMIN — CALCIUM 500 MG: 500 TABLET ORAL at 17:40

## 2022-03-23 RX ADMIN — SODIUM CHLORIDE, PRESERVATIVE FREE 10 ML: 5 INJECTION INTRAVENOUS at 06:39

## 2022-03-23 RX ADMIN — PANTOPRAZOLE SODIUM 40 MG: 40 TABLET, DELAYED RELEASE ORAL at 21:07

## 2022-03-23 RX ADMIN — MORPHINE SULFATE 1 MG: 2 INJECTION, SOLUTION INTRAMUSCULAR; INTRAVENOUS at 14:10

## 2022-03-23 RX ADMIN — MORPHINE SULFATE 1 MG: 2 INJECTION, SOLUTION INTRAMUSCULAR; INTRAVENOUS at 18:06

## 2022-03-23 RX ADMIN — HEPARIN SODIUM 5000 UNITS: 5000 INJECTION INTRAVENOUS; SUBCUTANEOUS at 06:39

## 2022-03-23 RX ADMIN — CALCIUM GLUCONATE 1000 MG: 20 INJECTION, SOLUTION INTRAVENOUS at 01:53

## 2022-03-23 RX ADMIN — SODIUM BICARBONATE 650 MG: 650 TABLET ORAL at 21:08

## 2022-03-23 RX ADMIN — LABETALOL HYDROCHLORIDE 200 MG: 200 TABLET, FILM COATED ORAL at 17:40

## 2022-03-23 RX ADMIN — CALCIUM GLUCONATE 1000 MG: 20 INJECTION, SOLUTION INTRAVENOUS at 13:42

## 2022-03-23 RX ADMIN — SODIUM CHLORIDE, PRESERVATIVE FREE 10 ML: 5 INJECTION INTRAVENOUS at 21:08

## 2022-03-23 RX ADMIN — SEVELAMER CARBONATE 800 MG: 800 TABLET, FILM COATED ORAL at 12:28

## 2022-03-23 RX ADMIN — HYDROXYCHLOROQUINE SULFATE 200 MG: 200 TABLET ORAL at 21:08

## 2022-03-23 RX ADMIN — SODIUM CHLORIDE, PRESERVATIVE FREE 10 ML: 5 INJECTION INTRAVENOUS at 15:42

## 2022-03-23 RX ADMIN — PIPERACILLIN AND TAZOBACTAM 3.38 G: 3; .375 INJECTION, POWDER, LYOPHILIZED, FOR SOLUTION INTRAVENOUS at 09:52

## 2022-03-23 RX ADMIN — LEVOTHYROXINE SODIUM 100 MCG: 0.1 TABLET ORAL at 06:53

## 2022-03-23 RX ADMIN — AMLODIPINE BESYLATE 10 MG: 5 TABLET ORAL at 12:28

## 2022-03-23 RX ADMIN — PIPERACILLIN AND TAZOBACTAM 3.38 G: 3; .375 INJECTION, POWDER, LYOPHILIZED, FOR SOLUTION INTRAVENOUS at 21:20

## 2022-03-23 RX ADMIN — HEPARIN SODIUM 5000 UNITS: 5000 INJECTION INTRAVENOUS; SUBCUTANEOUS at 21:08

## 2022-03-23 NOTE — PROGRESS NOTES
Alert and oriented x4, assessments and VS completed, medications administered per [facility], pt reports pain, PRN medication provided, PD completed.
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
## 2022-03-23 NOTE — ACP (ADVANCE CARE PLANNING)
Advance Care Planning   Advance Care Planning Inpatient Note  [facility] Department    Today's Date: 3/23/2022  Unit: [facility] CARDIAC/MEDICAL    Received request from rounding. Upon review of chart and communication with care team, patient's decision making abilities are not in question. Patient was/were present in the room during visit. Goals of ACP Conversation:  Discuss Advance Care planning documents    Health Care Decision Makers:    No healthcare decision makers have been documented. Click here to complete [address] including selection of the Healthcare Decision Maker Relationship (ie \"Primary\")    Summary:  No Decision Maker named by patient at this time    Advance Care Planning Documents (Patient Wishes) on file:  None       Interventions:  Deferred conversation as patient not interested in completing an advance directive at this time    Care Preferences Communicated:  No    Outcomes/Plan:  Patient is not interested in completing ACP at this time.     Chaplain Trever on 3/23/2022 at 10:36 AM
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
## 2022-03-23 NOTE — PROGRESS NOTES
conducted an initial consultation and Spiritual Assessment for Tereza Vargas, who is a 32 y.o.,female. Patients Primary Language is: [place]. According to the patients EMR Zoroastrianism Affiliation is: Agata Casarez. The reason the Patient came to the hospital is:   Patient Active Problem List    Diagnosis Date Noted    Encephalopathy 03/22/2022    Lupus ([facility] [address]     ESRD on dialysis ([facility] [address]     GERD (gastroesophageal reflux disease)     Hypertension     Thyroid disease         The  provided the following Interventions:  Initiated a relationship of care and support. Explored issues of priyanka, belief, spirituality and Catholic/ritual needs while hospitalized. Listened empathically. Provided chaplaincy education. Provided information about Spiritual Care Services. Offered assurance of prayer on patient's behalf. Chart reviewed. The following outcomes where achieved:  Patient shared limited information about both their medical narrative and spiritual journey/beliefs.  confirmed Patient's Zoroastrianism Affiliation. Patient processed feeling about current hospitalization. Patient expressed gratitude for 's visit. Assessment:  Patient does not have any Catholic/cultural needs that will affect patients preferences in health care. Plan:  Chaplains will continue to follow and will provide pastoral care on an as needed/requested basis.  recommends bedside caregivers page  on duty if patient shows signs of acute spiritual or emotional distress. Rev.  Anette Marcelo, Certified Respecting [id number] [facility] Consultant  [facility]  [phone]

## 2022-03-24 VITALS
DIASTOLIC BLOOD PRESSURE: 97 MMHG | WEIGHT: 165 LBS | SYSTOLIC BLOOD PRESSURE: 158 MMHG | OXYGEN SATURATION: 100 % | HEIGHT: 66 IN | TEMPERATURE: 98.2 F | HEART RATE: 84 BPM | RESPIRATION RATE: 16 BRPM | BODY MASS INDEX: 26.52 KG/M2

## 2022-03-24 PROBLEM — G93.40 ENCEPHALOPATHY: Status: ACTIVE | Noted: 2022-03-22

## 2022-03-24 LAB
ANION GAP SERPL CALC-SCNC: 9 MMOL/L (ref 3–18)
BASOPHILS # BLD: 0 K/UL (ref 0–0.1)
BASOPHILS NFR BLD: 1 % (ref 0–2)
BUN SERPL-MCNC: 76 MG/DL (ref 7–18)
BUN/CREAT SERPL: 4 (ref 12–20)
CA-I SERPL-SCNC: 0.95 MMOL/L (ref 1.12–1.32)
CALCIUM SERPL-MCNC: 7.3 MG/DL (ref 8.5–10.1)
CHLORIDE SERPL-SCNC: 110 MMOL/L (ref 100–111)
CO2 SERPL-SCNC: 22 MMOL/L (ref 21–32)
CREAT SERPL-MCNC: 16.9 MG/DL (ref 0.6–1.3)
DIFFERENTIAL METHOD BLD: ABNORMAL
EOSINOPHIL # BLD: 0.3 K/UL (ref 0–0.4)
EOSINOPHIL NFR BLD: 5 % (ref 0–5)
ERYTHROCYTE [DISTWIDTH] IN BLOOD BY AUTOMATED COUNT: 16.4 % (ref 11.6–14.5)
FERRITIN SERPL-MCNC: 885 NG/ML (ref 8–388)
GLUCOSE SERPL-MCNC: 95 MG/DL (ref 74–99)
HCT VFR BLD AUTO: 25.9 % (ref 35–45)
HCT VFR BLD AUTO: 30 % (ref 35–45)
HGB BLD-MCNC: 7.9 G/DL (ref 12–16)
HGB BLD-MCNC: 9.1 G/DL (ref 12–16)
IMM GRANULOCYTES # BLD AUTO: 0 K/UL (ref 0–0.04)
IMM GRANULOCYTES NFR BLD AUTO: 0 % (ref 0–0.5)
IRON SATN MFR SERPL: 96 % (ref 20–50)
IRON SERPL-MCNC: 148 UG/DL (ref 50–175)
LYMPHOCYTES # BLD: 0.9 K/UL (ref 0.9–3.6)
LYMPHOCYTES NFR BLD: 17 % (ref 21–52)
MCH RBC QN AUTO: 26.2 PG (ref 24–34)
MCHC RBC AUTO-ENTMCNC: 30.5 G/DL (ref 31–37)
MCV RBC AUTO: 85.8 FL (ref 78–100)
MONOCYTES # BLD: 0.6 K/UL (ref 0.05–1.2)
MONOCYTES NFR BLD: 12 % (ref 3–10)
NEUTS SEG # BLD: 3.3 K/UL (ref 1.8–8)
NEUTS SEG NFR BLD: 64 % (ref 40–73)
NRBC # BLD: 0 K/UL (ref 0–0.01)
NRBC BLD-RTO: 0 PER 100 WBC
PLATELET # BLD AUTO: 98 K/UL (ref 135–420)
POTASSIUM SERPL-SCNC: 4.6 MMOL/L (ref 3.5–5.5)
RBC # BLD AUTO: 3.02 M/UL (ref 4.2–5.3)
SODIUM SERPL-SCNC: 141 MMOL/L (ref 136–145)
TIBC SERPL-MCNC: 154 UG/DL (ref 250–450)
WBC # BLD AUTO: 5.1 K/UL (ref 4.6–13.2)

## 2022-03-24 PROCEDURE — 82728 ASSAY OF FERRITIN: CPT

## 2022-03-24 PROCEDURE — 74011250636 HC RX REV CODE- 250/636: Performed by: INTERNAL MEDICINE

## 2022-03-24 PROCEDURE — 74011250637 HC RX REV CODE- 250/637: Performed by: STUDENT IN AN ORGANIZED HEALTH CARE EDUCATION/TRAINING PROGRAM

## 2022-03-24 PROCEDURE — 74011000250 HC RX REV CODE- 250: Performed by: STUDENT IN AN ORGANIZED HEALTH CARE EDUCATION/TRAINING PROGRAM

## 2022-03-24 PROCEDURE — 80048 BASIC METABOLIC PNL TOTAL CA: CPT

## 2022-03-24 PROCEDURE — 85018 HEMOGLOBIN: CPT

## 2022-03-24 PROCEDURE — A4722 DIALYS SOL FLD VOL > 1999CC: HCPCS | Performed by: INTERNAL MEDICINE

## 2022-03-24 PROCEDURE — 87205 SMEAR GRAM STAIN: CPT

## 2022-03-24 PROCEDURE — 36415 COLL VENOUS BLD VENIPUNCTURE: CPT

## 2022-03-24 PROCEDURE — 74011000250 HC RX REV CODE- 250: Performed by: HOSPITALIST

## 2022-03-24 PROCEDURE — 74011250636 HC RX REV CODE- 250/636: Performed by: STUDENT IN AN ORGANIZED HEALTH CARE EDUCATION/TRAINING PROGRAM

## 2022-03-24 PROCEDURE — 74011250636 HC RX REV CODE- 250/636: Performed by: HOSPITALIST

## 2022-03-24 PROCEDURE — 85025 COMPLETE CBC W/AUTO DIFF WBC: CPT

## 2022-03-24 PROCEDURE — 83540 ASSAY OF IRON: CPT

## 2022-03-24 PROCEDURE — 74011000258 HC RX REV CODE- 258: Performed by: HOSPITALIST

## 2022-03-24 PROCEDURE — 74011250637 HC RX REV CODE- 250/637: Performed by: HOSPITALIST

## 2022-03-24 PROCEDURE — 82330 ASSAY OF CALCIUM: CPT

## 2022-03-24 PROCEDURE — 87015 SPECIMEN INFECT AGNT CONCNTJ: CPT

## 2022-03-24 RX ORDER — SODIUM BICARBONATE 650 MG/1
650 TABLET ORAL 2 TIMES DAILY
Qty: 60 TABLET | Refills: 0 | Status: SHIPPED | OUTPATIENT
Start: 2022-03-24 | End: 2022-04-23

## 2022-03-24 RX ORDER — SEVELAMER CARBONATE 800 MG/1
800 TABLET, FILM COATED ORAL
Qty: 90 TABLET | Refills: 0 | Status: SHIPPED | OUTPATIENT
Start: 2022-03-24 | End: 2022-04-23

## 2022-03-24 RX ORDER — BUMETANIDE 2 MG/1
2 TABLET ORAL DAILY
Qty: 30 TABLET | Refills: 0 | Status: SHIPPED | OUTPATIENT
Start: 2022-03-24

## 2022-03-24 RX ORDER — CALCIUM GLUCONATE 20 MG/ML
1 INJECTION, SOLUTION INTRAVENOUS ONCE
Status: COMPLETED | OUTPATIENT
Start: 2022-03-24 | End: 2022-03-24

## 2022-03-24 RX ORDER — CALCITRIOL 0.5 UG/1
1.5 CAPSULE ORAL DAILY
Qty: 90 CAPSULE | Refills: 0 | Status: SHIPPED | OUTPATIENT
Start: 2022-03-24 | End: 2022-04-23

## 2022-03-24 RX ORDER — AMLODIPINE BESYLATE 10 MG/1
10 TABLET ORAL
Qty: 30 TABLET | Refills: 0 | Status: SHIPPED | OUTPATIENT
Start: 2022-03-24

## 2022-03-24 RX ORDER — OXYCODONE AND ACETAMINOPHEN 5; 325 MG/1; MG/1
1 TABLET ORAL
Qty: 9 TABLET | Refills: 0 | Status: SHIPPED | OUTPATIENT
Start: 2022-03-24 | End: 2022-03-27

## 2022-03-24 RX ADMIN — PIPERACILLIN AND TAZOBACTAM 3.38 G: 3; .375 INJECTION, POWDER, LYOPHILIZED, FOR SOLUTION INTRAVENOUS at 08:40

## 2022-03-24 RX ADMIN — SODIUM CHLORIDE, SODIUM LACTATE, CALCIUM CHLORIDE, MAGNESIUM CHLORIDE AND DEXTROSE 2000 ML: 2.5; 538; 448; 18.3; 5.08 INJECTION, SOLUTION INTRAPERITONEAL at 09:16

## 2022-03-24 RX ADMIN — PANTOPRAZOLE SODIUM 40 MG: 40 TABLET, DELAYED RELEASE ORAL at 08:41

## 2022-03-24 RX ADMIN — OXYCODONE AND ACETAMINOPHEN 1 TABLET: 5; 325 TABLET ORAL at 08:41

## 2022-03-24 RX ADMIN — SODIUM BICARBONATE 650 MG: 650 TABLET ORAL at 08:41

## 2022-03-24 RX ADMIN — LABETALOL HYDROCHLORIDE 200 MG: 200 TABLET, FILM COATED ORAL at 17:00

## 2022-03-24 RX ADMIN — CALCITRIOL CAPSULES 0.25 MCG 1 MCG: 0.25 CAPSULE ORAL at 08:41

## 2022-03-24 RX ADMIN — MORPHINE SULFATE 1 MG: 2 INJECTION, SOLUTION INTRAMUSCULAR; INTRAVENOUS at 09:58

## 2022-03-24 RX ADMIN — CALCIUM 500 MG: 500 TABLET ORAL at 08:41

## 2022-03-24 RX ADMIN — SEVELAMER CARBONATE 800 MG: 800 TABLET, FILM COATED ORAL at 13:02

## 2022-03-24 RX ADMIN — B-COMPLEX W/ C & FOLIC ACID TAB 1 MG 1 TABLET: 1 TAB at 08:41

## 2022-03-24 RX ADMIN — OXYCODONE AND ACETAMINOPHEN 1 TABLET: 5; 325 TABLET ORAL at 14:58

## 2022-03-24 RX ADMIN — SODIUM CHLORIDE, PRESERVATIVE FREE 10 ML: 5 INJECTION INTRAVENOUS at 07:06

## 2022-03-24 RX ADMIN — SODIUM CHLORIDE, SODIUM LACTATE, CALCIUM CHLORIDE, MAGNESIUM CHLORIDE AND DEXTROSE 2000 ML: 2.5; 538; 448; 18.3; 5.08 INJECTION, SOLUTION INTRAPERITONEAL at 04:25

## 2022-03-24 RX ADMIN — AMLODIPINE BESYLATE 10 MG: 5 TABLET ORAL at 13:02

## 2022-03-24 RX ADMIN — MORPHINE SULFATE 1 MG: 2 INJECTION, SOLUTION INTRAMUSCULAR; INTRAVENOUS at 07:06

## 2022-03-24 RX ADMIN — HEPARIN SODIUM 5000 UNITS: 5000 INJECTION INTRAVENOUS; SUBCUTANEOUS at 13:05

## 2022-03-24 RX ADMIN — MORPHINE SULFATE 1 MG: 2 INJECTION, SOLUTION INTRAMUSCULAR; INTRAVENOUS at 00:04

## 2022-03-24 RX ADMIN — CALCIUM GLUCONATE 1000 MG: 20 INJECTION, SOLUTION INTRAVENOUS at 11:22

## 2022-03-24 RX ADMIN — CALCIUM 500 MG: 500 TABLET ORAL at 17:00

## 2022-03-24 RX ADMIN — CALCITRIOL CAPSULES 0.25 MCG 0.5 MCG: 0.25 CAPSULE ORAL at 08:42

## 2022-03-24 RX ADMIN — MORPHINE SULFATE 1 MG: 2 INJECTION, SOLUTION INTRAMUSCULAR; INTRAVENOUS at 03:03

## 2022-03-24 RX ADMIN — SODIUM CHLORIDE, PRESERVATIVE FREE 10 ML: 5 INJECTION INTRAVENOUS at 13:05

## 2022-03-24 RX ADMIN — SODIUM CHLORIDE, SODIUM LACTATE, CALCIUM CHLORIDE, MAGNESIUM CHLORIDE AND DEXTROSE 2000 ML: 2.5; 538; 448; 18.3; 5.08 INJECTION, SOLUTION INTRAPERITONEAL at 13:04

## 2022-03-24 RX ADMIN — LEVOTHYROXINE SODIUM 100 MCG: 0.1 TABLET ORAL at 07:06

## 2022-03-24 RX ADMIN — SEVELAMER CARBONATE 800 MG: 800 TABLET, FILM COATED ORAL at 09:23

## 2022-03-24 RX ADMIN — BUMETANIDE 2 MG: 0.25 INJECTION INTRAMUSCULAR; INTRAVENOUS at 08:43

## 2022-03-24 RX ADMIN — LABETALOL HYDROCHLORIDE 200 MG: 200 TABLET, FILM COATED ORAL at 08:40

## 2022-03-24 NOTE — DISCHARGE INSTRUCTIONS
Patient Education        DASH Diet: Care Instructions  Your Care Instructions     The DASH diet is an eating plan that can help lower your blood pressure. DASH stands for Dietary Approaches to Stop Hypertension. Hypertension is high blood pressure. The DASH diet focuses on eating foods that are high in calcium, potassium, and magnesium. These nutrients can lower blood pressure. The foods that are highest in these nutrients are fruits, vegetables, low-fat dairy products, nuts, seeds, and legumes. But taking calcium, potassium, and magnesium supplements instead of eating foods that are high in those nutrients does not have the same effect. The DASH diet also includes whole grains, fish, and poultry. The DASH diet is one of several lifestyle changes your doctor may recommend to lower your high blood pressure. Your doctor may also want you to decrease the amount of sodium in your diet. Lowering sodium while following the DASH diet can lower blood pressure even further than just the DASH diet alone. Follow-up care is a key part of your treatment and safety. Be sure to make and go to all appointments, and call your doctor if you are having problems. It's also a good idea to know your test results and keep a list of the medicines you take. How can you care for yourself at home? Following the DASH diet  · Eat 4 to 5 servings of fruit each day. A serving is 1 medium-sized piece of fruit, ½ cup chopped or canned fruit, 1/4 cup dried fruit, or 4 ounces (½ cup) of fruit juice. Choose fruit more often than fruit juice. · Eat 4 to 5 servings of vegetables each day. A serving is 1 cup of lettuce or raw leafy vegetables, ½ cup of chopped or cooked vegetables, or 4 ounces (½ cup) of vegetable juice. Choose vegetables more often than vegetable juice. · Get 2 to 3 servings of low-fat and fat-free dairy each day. A serving is 8 ounces of milk, 1 cup of yogurt, or 1 ½ ounces of cheese. · Eat 6 to 8 servings of grains each day.  A serving is 1 slice of bread, 1 ounce of dry cereal, or ½ cup of cooked rice, pasta, or cooked cereal. Try to choose whole-grain products as much as possible. · Limit lean meat, poultry, and fish to 2 servings each day. A serving is 3 ounces, about the size of a deck of cards. · Eat 4 to 5 servings of nuts, seeds, and legumes (cooked dried beans, lentils, and split peas) each week. A serving is 1/3 cup of nuts, 2 tablespoons of seeds, or ½ cup of cooked beans or peas. · Limit fats and oils to 2 to 3 servings each day. A serving is 1 teaspoon of vegetable oil or 2 tablespoons of salad dressing. · Limit sweets and added sugars to 5 servings or less a week. A serving is 1 tablespoon jelly or jam, ½ cup sorbet, or 1 cup of lemonade. · Eat less than 2,300 milligrams (mg) of sodium a day. If you limit your sodium to 1,500 mg a day, you can lower your blood pressure even more. · Be aware that all of these are the suggested number of servings for people who eat 1,800 to 2,000 calories a day. Your recommended number of servings may be different if you need more or fewer calories. Tips for success  · Start small. Do not try to make dramatic changes to your diet all at once. You might feel that you are missing out on your favorite foods and then be more likely to not follow the plan. Make small changes, and stick with them. Once those changes become habit, add a few more changes. · Try some of the following:  ? Make it a goal to eat a fruit or vegetable at every meal and at snacks. This will make it easy to get the recommended amount of fruits and vegetables each day. ? Try yogurt topped with fruit and nuts for a snack or healthy dessert. ? Add lettuce, tomato, cucumber, and onion to sandwiches. ? Combine a ready-made pizza crust with low-fat mozzarella cheese and lots of vegetable toppings. Try using tomatoes, squash, spinach, broccoli, carrots, cauliflower, and onions. ?  Have a variety of cut-up vegetables with a low-fat dip as an appetizer instead of chips and dip. ? Sprinkle sunflower seeds or chopped almonds over salads. Or try adding chopped walnuts or almonds to cooked vegetables. ? Try some vegetarian meals using beans and peas. Add garbanzo or kidney beans to salads. Make burritos and tacos with mashed lorenz beans or black beans. Where can you learn more? Go to http://www.bustillos.com/  Enter H967 in the search box to learn more about \"DASH Diet: Care Instructions. \"  Current as of: January 10, 2022               Content Version: 13.2  © 2006-2022 [employer]. Care instructions adapted under license by [employer] (which disclaims liability or warranty for this information). If you have questions about a medical condition or this instruction, always ask your healthcare professional. [place] [address] any warranty or liability for your use of this information. Patient Education        High Blood Pressure: Care Instructions  Overview     It's normal for blood pressure to go up and down throughout the day. But if it stays up, you have high blood pressure. Another name for high blood pressure is hypertension. Despite what a lot of people think, high blood pressure usually doesn't cause headaches or make you feel dizzy or lightheaded. It usually has no symptoms. But it does increase your risk of stroke, heart attack, and other problems. You and your doctor will talk about your risks of these problems based on your blood pressure. Your doctor will give you a goal for your blood pressure. Your goal will be based on your health and your age. Lifestyle changes, such as eating healthy and being active, are always important to help lower blood pressure. You might also take medicine to reach your blood pressure goal.  Follow-up care is a key part of your treatment and safety.  Be sure to make and go to all appointments, and call your doctor if you are having problems. It's also a good idea to know your test results and keep a list of the medicines you take. How can you care for yourself at home? Medical treatment  · If you stop taking your medicine, your blood pressure will go back up. You may take one or more types of medicine to lower your blood pressure. Be safe with medicines. Take your medicine exactly as prescribed. Call your doctor if you think you are having a problem with your medicine. · Talk to your doctor before you start taking aspirin every day. Aspirin can help certain people lower their risk of a heart attack or stroke. But taking aspirin isn't right for everyone, because it can cause serious bleeding. · See your doctor regularly. You may need to see the doctor more often at first or until your blood pressure comes down. · If you are taking blood pressure medicine, talk to your doctor before you take decongestants or anti-inflammatory medicine, such as ibuprofen. Some of these medicines can raise blood pressure. · Learn how to check your blood pressure at home. Lifestyle changes  · Stay at a healthy weight. This is especially important if you put on weight around the waist. Losing even 10 pounds can help you lower your blood pressure. · If your doctor recommends it, get more exercise. Walking is a good choice. Bit by bit, increase the amount you walk every day. Try for at least 30 minutes on most days of the week. You also may want to swim, bike, or do other activities. · Avoid or limit alcohol. Talk to your doctor about whether you can drink any alcohol. · Try to limit how much sodium you eat to less than 2,300 milligrams (mg) a day. Your doctor may ask you to try to eat less than 1,500 mg a day. · Eat plenty of fruits (such as bananas and oranges), vegetables, legumes, whole grains, and low-fat dairy products. · Lower the amount of saturated fat in your diet. Saturated fat is found in animal products such as milk, cheese, and meat. Limiting these foods may help you lose weight and also lower your risk for heart disease. · Do not smoke. Smoking increases your risk for heart attack and stroke. If you need help quitting, talk to your doctor about stop-smoking programs and medicines. These can increase your chances of quitting for good. When should you call for help? Call 911  anytime you think you may need emergency care. This may mean having symptoms that suggest that your blood pressure is causing a serious heart or blood vessel problem. Your blood pressure may be over 180/120. For example, call 911 if:    · You have symptoms of a heart attack. These may include:  ? Chest pain or pressure, or a strange feeling in the chest.  ? Sweating. ? Shortness of breath. ? Nausea or vomiting. ? Pain, pressure, or a strange feeling in the back, neck, jaw, or upper belly or in one or both shoulders or arms. ? Lightheadedness or sudden weakness. ? A fast or irregular heartbeat.     · You have symptoms of a stroke. These may include:  ? Sudden numbness, tingling, weakness, or loss of movement in your face, arm, or leg, especially on only one side of your body. ? Sudden vision changes. ? Sudden trouble speaking. ? Sudden confusion or trouble understanding simple statements. ? Sudden problems with walking or balance. ? A sudden, severe headache that is different from past headaches.     · You have severe back or belly pain. Do not wait until your blood pressure comes down on its own. Get help right away. Call your doctor now or seek immediate care if:    · Your blood pressure is much higher than normal (such as 180/120 or higher), but you don't have symptoms.     · You think high blood pressure is causing symptoms, such as:  ? Severe headache.  ? Blurry vision. Watch closely for changes in your health, and be sure to contact your doctor if:    · Your blood pressure measures higher than your doctor recommends at least 2 times.  That means the top number is higher or the bottom number is higher, or both.     · You think you may be having side effects from your blood pressure medicine. Where can you learn more? Go to http://www.gray.com/  Enter [id number] in the search box to learn more about \"High Blood Pressure: Care Instructions. \"  Current as of: January 10, 2022               Content Version: 13.2  © 2006-2022 [email or web address]. Care instructions adapted under license by [employer] (which disclaims liability or warranty for this information). If you have questions about a medical condition or this instruction, always ask your healthcare professional. [place] [address] any warranty or liability for your use of this information. Patient Education        Kidney Dialysis: Care Instructions  Overview     Dialysis is a process that filters wastes from the blood when your kidneys can no longer do the job. It is not a cure, but it can help you live longer and feel better. It is a lifesaving treatment when you have kidney failure. Normal kidneys work 24 hours a day to clean wastes from your blood. Your kidneys are not able to do this job, so a process called dialysis will do some of the work for your kidneys. You and your doctor will decide which type of dialysis you should have. Peritoneal dialysis uses the lining of your belly (peritoneum) to filter your blood. You can do it at home, on a daily basis. Hemodialysis uses a man-made filter called a dialyzer to clean your blood. Most people need to go to a hospital or clinic 3 days a week for several hours each time. Sometimes hemodialysis can be done at home. It is normal to have questions about your treatment, and you have a right to know what is happening to you. Learning about dialysis can help you take an active role in your treatment. Dialysis does not cure kidney disease, but it can help you live longer and feel better.  You will need to follow your diet and treatment schedule carefully. Follow-up care is a key part of your treatment and safety. Be sure to make and go to all appointments, and call your doctor if you are having problems. It's also a good idea to know your test results and keep a list of the medicines you take. What do you need to know about peritoneal dialysis? Peritoneal dialysis uses the lining of your belly (or peritoneal membrane) to filter your blood. Before you can begin peritoneal dialysis, your doctor will need to place a thin tube called a catheter in your belly. This is the dialysis access. · Peritoneal dialysis can be done at home or in any clean place. You may be able to do it while you sleep. · You can do it by yourself. You don't have to rely on help from others. · You can do it at the times you choose as long as you do the right number of treatments. · It has to be done every day of the week. · Some people find it hard to do all the required steps. · It increases your chance for a serious infection of the lining of the belly (peritoneum). Overview  Hemodialysis uses a man-made membrane (dialyzer) to clean your blood. You're connected to the dialyzer by tubes attached to your blood vessels. Before you start dialysis, your doctor will create a site where the blood can flow in and out of your body during your sessions. · Hemodialysis is done mainly by trained health workers. They can watch for problems. · You can do it at a center where other people are doing dialysis. This can help provide emotional support. · You can schedule your treatments in the evenings and maybe at home. This gives you more control over your schedule. · It usually needs to be done on a set schedule 3 times a week. · It can cause side effects, like low blood pressure and muscle cramps. These can often be treated easily. · It requires being poked by a needle at each treatment. This bothers some people.  Others get used to it and can do it themselves. How can you care for yourself at home? · Be sure to have all of your dialysis sessions. Do not try to shorten or skip your sessions. You have a better chance of a longer and healthier life by getting your full treatment. · Your doctor or health care team will show you the steps you need to go through each day before, during, and after dialysis. Be sure to follow these steps. If you do not understand a step, talk to your team.  · Your doctor and dietitian will help you design menus that follow your diet. Be sure to follow your diet guidelines. ? You will need to limit fluids and certain foods that contain salt (sodium), potassium, and phosphorus. ? You may need higher levels of protein in your diet. · Your doctor may recommend certain vitamins. But do not take any other medicine, including over-the-counter medicines, vitamins, and herbal products, without talking to your doctor first.  · Do not smoke. Smoking raises your risk of many health problems, including more kidney damage. If you need help quitting, talk to your doctor about stop-smoking programs and medicines. These can increase your chances of quitting for good. · Do not take ibuprofen (Advil, Motrin), naproxen (Aleve), or similar medicines, unless your doctor tells you to. These medicines may make kidney problems worse. When should you call for help? Call your doctor now or seek immediate medical care if:    · You have a fever.     · You are dizzy or lightheaded, or you feel like you may faint.     · You are confused or cannot think clearly.     · You have new or worse nausea or vomiting.     · You have new or more blood in your urine.     · You have new swelling. Where can you learn more? Go to http://www.gray.com/  Enter Z390 in the search box to learn more about \"Kidney Dialysis: Care Instructions. \"  Current as of: September 8, 2021               Content Version: 13.2  © 1090-8385 [employer]. Care instructions adapted under license by [employer] (which disclaims liability or warranty for this information). If you have questions about a medical condition or this instruction, always ask your healthcare professional. [address] any warranty or liability for your use of this information.

## 2022-03-24 NOTE — PROGRESS NOTES
Case discussed with Dr. Marycarmen Figueroa recommended cardiac monitor as outpatient.  Ok to d/c pt

## 2022-03-24 NOTE — PROGRESS NOTES
D/C plan: Home. Pt's mom will transport her home. Met w/ the pt at bedside. The pt states she has a f/u appt w/ Dr. Raymundo Flowers on Monday. The pt states she has been to Dr. Joselito Jeronimo [phone]; [facility] one time. She states he wanted to \"stick me with needles\" so she didn't return back to him as she states that she didn't feel that was right for her. The pt states she doesn't have a rheumatologist established here in [place]. Advised her that rheumatology would possibly help manage her lupus pain. She is open to the idea of seeing a rheumatologist. Magdi Fossa her that referral will need to come from her PCP. She verbalized an understanding. CMS will call to make her f/u appt w/ her PCP. CMS will mention the referral to rheumatology. Advised pt to also ask for that referral from her PCP. Pt will resume her PD dialysis at d/c. Pt will f/u w/ Dr. Raymundo Flowers on Monday to f/u on intraperitoneal hematoma in the past which seem like resolved in current CT abdomen per Dr. José Luis June note. Care Management Interventions  PCP Verified by CM: Yes (Dr. Sola Rizzo)  Palliative Care Criteria Met (RRAT>21 & CHF Dx)?: No  Mode of Transport at Discharge: Other (see comment) (Family to transport. )  Transition of Care Consult (CM Consult): Other (Home.  Independently. )  Discharge Durable Medical Equipment: No  Health Maintenance Reviewed: Yes  Physical Therapy Consult: No  Occupational Therapy Consult: No  Speech Therapy Consult: No  Support Systems: Spouse/Significant Other,Parent(s)  Confirm Follow Up Transport: Family  The Plan for Transition of Care is Related to the Following Treatment Goals : home  The Patient and/or Patient Representative was Provided with a Choice of Provider and Agrees with the Discharge Plan?: Yes (The pt is alert and oriented and in agreement w/ the d/c plan of home w/ family.)  Freedom of Choice List was Provided with Basic Dialogue that Supports the Patient's Individualized Plan of Care/Goals, Treatment Preferences and Shares the Quality Data Associated with the Providers?:  (n/a)  Discharge Location  Patient Expects to be Discharged to[de-identified] Home

## 2022-03-24 NOTE — PROGRESS NOTES
Bedside and Verbal shift change report given to Maricarmen Santos RN (oncoming nurse) by Alma Ngo RN (offgoing nurse). Report included the following information SBAR, Kardex, ED Summary, Intake/Output, MAR, Recent Results, Med Rec Status and Cardiac Rhythm NSR.     1949  Shift assessement complete  Pt resting quietly with eyes open and chest rising and falling evenly   No c/o pain or signs of distress  Bed locked and in lowest position   Call light within reach     Pt stated that her pain was 8/10 - in her back and would like her morphine to be increased. She stated neither the morphine or percocet worked   I paged the hospitalist and received orders     0000  Pt stated that at night her PD is q8 hours - she said she should not get it again until 0430      3 [facility] Cardiac/Medical Night Shift Chart Audit    Chart Audit completed? YES      Bedside and Verbal shift change report given to Myla Brandon RN (oncoming nurse) by Maricarmen Santos RN (offgoing nurse). Report included the following information SBAR, Kardex, ED Summary, Intake/Output, MAR, Recent Results, Med Rec Status and Cardiac Rhythm NSR.
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
## 2022-03-24 NOTE — PROGRESS NOTES
Cardiology Progress Note        Patient: James Raines        Sex: female          DOA: 3/22/2022  YOB: 1995      Age:  32 y.o.        LOS:  LOS: 2 days       patient seen and examined, chart reviewed  Assessment/Plan     Patient Active Problem List   Diagnosis Code    Encephalopathy G93.40    Lup[address] ([facility] [address] M32.9    ESRD on dialysis ([facility] [address] N18.6, Z99.2    GERD (gastroesophageal reflux disease) K21.9    Hypertension I10    Thyroid disease E07.9    Psychiatric disorder F99       abnormal EKG   Prolong QT    Plan:    Prolong QT is most likely due to hydroxychloroquine  Patient will need outpatient event monitor. Follow-up in cardiology clinic 4-6 weeks after discharge. Plan discussed with patient and she verbally understood. Subjective:    cc:   denies any chest pain, denies any shortness of breath. I would like to go home      REVIEW OF SYSTEMS:     General: No fevers or chills. Cardiovascular: No chest pain,No palpitations, No orthopnea, No PND, No leg swelling, No claudication  Pulmonary: No shortness of breath. Gastrointestinal: No nausea, vomiting, bleeding  Neurology: No Dizziness    Objective:      Visit Vitals  BP (!) 158/97   Pulse 84   Temp 98.2 °F (36.8 °C)   Resp 16   Ht 5' 6\" (1.676 m)   Wt 74.8 kg (165 lb)   SpO2 100%   BMI 26.63 kg/m²     Body mass index is 26.63 kg/m². Physical Exam:  General Appearance: Comfortable, not using accessory muscles of respiration. HEENT: [place]. HEAD: Atraumatic  NECK: No JVD, no thyroidomeglay. CAROTIDS:  LUNGS: Clear bilaterally. HEART: S1+S2 audible, no murmur, no pericardial rub. ABD: Non-tender, BS Audible    EXT: No edema, and no cyanosis. VASCULAR EXAM: Pulses are intact. PSYCHIATRIC EXAM: Mood is appropriate. MUSCULOSKELETAL: Grossly no joint deformity.   NEUROLOGICAL: AAO times 3, No motor and sensory deficit    Medication:  Current Facility-Administered Medications   Medication Dose Route Frequency    calcium carbonate (OS-VIRGILIO) tablet 500 mg [elemental]  500 mg Oral TID    sodium bicarbonate tablet 650 mg  650 mg Oral BID    bumetanide (BUMEX) injection 2 mg  2 mg IntraVENous DAILY    amLODIPine (NORVASC) tablet 10 mg  10 mg Oral PCL    sevelamer carbonate (RENVELA) tab 800 mg  800 mg Oral TID WITH MEALS    divalproex ER (DEPAKOTE ER) 24 hour tablet 500 mg  500 mg Oral DAILY    morphine injection 1 mg  1 mg IntraVENous Q3H PRN    peritoneal dialysis DEXTROSE 2.5% (2.5 mEq/L low calcium) solution 2,000 mL  2,000 mL IntraPERitoneal Q4H    sodium chloride (NS) flush 5-40 mL  5-40 mL IntraVENous Q8H    sodium chloride (NS) flush 5-40 mL  5-40 mL IntraVENous PRN    acetaminophen (TYLENOL) tablet 650 mg  650 mg Oral Q6H PRN    Or    acetaminophen (TYLENOL) suppository 650 mg  650 mg Rectal Q6H PRN    bisacodyL (DULCOLAX) suppository 10 mg  10 mg Rectal DAILY PRN    promethazine (PHENERGAN) tablet 12.5 mg  12.5 mg Oral Q6H PRN    heparin (porcine) injection 5,000 Units  5,000 Units SubCUTAneous Q8H    calcitRIOL (ROCALTROL) capsule 1 mcg  1 mcg Oral DAILY    albuterol (PROVENTIL HFA, VENTOLIN HFA, PROAIR HFA) inhaler 1 Puff (Patient Supplied)  1 Puff Inhalation PRN    vit B Cmplx 3-FA-Vit C-Biotin (NEPHRO BRITTANI RX) tablet 1 Tablet  1 Tablet Oral DAILY    calcitRIOL (ROCALTROL) capsule 0.5 mcg  0.5 mcg Oral DAILY    hydrOXYchloroQUINE (PLAQUENIL) tablet 200 mg  200 mg Oral QHS    labetaloL (NORMODYNE) tablet 200 mg  200 mg Oral TID    pantoprazole (PROTONIX) tablet 40 mg  40 mg Oral BID    levothyroxine (SYNTHROID) tablet 100 mcg  100 mcg Oral ACB    piperacillin-tazobactam (ZOSYN) 3.375 g in 0.9% sodium chloride (MBP/ADV) 100 mL MBP  3.375 g IntraVENous Q12H    oxyCODONE-acetaminophen (PERCOCET) 5-325 mg per tablet 1 Tablet  1 Tablet Oral Q6H PRN     Current Outpatient Medications   Medication Sig    calcitRIOL (ROCALTROL) 0.5 mcg capsule Take 3 Capsules by mouth daily for 30 days.  amLODIPine (NORVASC) 10 mg tablet Take 1 Tablet by mouth daily (after lunch). Indications: high blood pressure    bumetanide (BUMEX) 2 mg tablet Take 1 Tablet by mouth daily.  oxyCODONE-acetaminophen (PERCOCET) 5-325 mg per tablet Take 1 Tablet by mouth every eight (8) hours as needed for Pain for up to 3 days. Max Daily Amount: 3 Tablets.  sevelamer carbonate (RENVELA) 800 mg tab tab Take 1 Tablet by mouth three (3) times daily (with meals) for 30 days.  sodium bicarbonate 650 mg tablet Take 1 Tablet by mouth two (2) times a day for 30 days.  labetaloL (NORMODYNE) 200 mg tablet Take 200 mg by mouth three (3) times daily. Indications: high blood pressure    calcium carbonate (OS-VIRGILIO) 500 mg calcium (1,250 mg) tablet Take 3 Tablets by mouth daily as needed.  ondansetron (ZOFRAN ODT) 8 mg disintegrating tablet Take 1 Tablet by mouth every eight (8) hours as needed for Nausea or Vomiting. Indications: prevent nausea and vomiting after surgery    albuterol (PROVENTIL HFA, VENTOLIN HFA, PROAIR HFA) 90 mcg/actuation inhaler Take  by inhalation as needed for Wheezing.  budesonide (PULMICORT) 0.25 mg/2 mL nbsp by Nebulization route daily (after lunch).  cetirizine (ZYRTEC) 10 mg tablet Take  by mouth daily (after lunch).  divalproex ER (Depakote ER) 500 mg ER tablet Take 500 mg by mouth two (2) times a day. Indications: bipolar disorder in remission    escitalopram oxalate (Lexapro) 10 mg tablet Take 10 mg by mouth daily.  famotidine (PEPCID) 20 mg tablet Take 20 mg by mouth nightly.  ferrous sulfate 324 mg (65 mg iron) tablet Take  by mouth two (2) times daily (with meals).  fluticasone propion-salmeteroL (Advair Diskus) 250-50 mcg/dose diskus inhaler Take 1 Puff by inhalation every twelve (12) hours.  fluticasone propionate (FLONASE NA) by Nasal route as needed.  hydrOXYchloroQUINE (Plaquenil) 200 mg tablet Take 200 mg by mouth nightly.  Indications: systemic lupus erythematosus, an autoimmune disease, Lupus    levothyroxine (SYNTHROID) 100 mcg tablet Take 100 mcg by mouth Daily (before breakfast).  pantoprazole (PROTONIX) 40 mg tablet Take 40 mg by mouth two (2) times a day. Lab/Data Reviewed:       Recent Labs     03/24/22  1140 03/24/22  0049 03/23/22 0215 03/22/22  1529 03/22/22  1529   WBC  --  5.1 6.0  --  6.5   HGB 9.1* 7.9* 9.0*   < > 8.5*   HCT 30.0* 25.9* 30.7*   < > 27.7*   PLT  --  98* 107*  --  98*    < > = values in this interval not displayed.      Recent Labs     03/24/22  0049 03/23/22 0215 03/22/22  1529    143 143   K 4.6 5.4 5.8*    114* 115*   CO2 22 19* 17*   GLU 95 97 86   BUN 76* 88* 88*   CREA 16.90* 19.50* 20.20*   CA 7.3* 6.8* 6.4*       Signed By: Enoch Parry MD     March 24, 2022
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
## 2022-03-24 NOTE — PROGRESS NOTES
Problem: Falls - Risk of  Goal: *Absence of Falls  Description: Document Nicole Locket Fall Risk and appropriate interventions in the flowsheet.   Outcome: Progressing Towards Goal  Note: Fall Risk Interventions:            Medication Interventions: Teach patient to arise slowly                   Problem: Patient Education: Go to Patient Education Activity  Goal: Patient/Family Education  Outcome: Progressing Towards Goal     Problem: Chronic Renal Failure  Goal: *Fluid and electrolytes stabilized  Outcome: Progressing Towards Goal
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
## 2022-03-24 NOTE — PROGRESS NOTES
Problem: Falls - Risk of  Goal: *Absence of Falls  Description: Document Katelyn Sandrine Fall Risk and appropriate interventions in the flowsheet.   Outcome: Progressing Towards Goal  Note: Fall Risk Interventions:            Medication Interventions: Teach patient to arise slowly,Patient to call before getting OOB                   Problem: Patient Education: Go to Patient Education Activity  Goal: Patient/Family Education  Outcome: Progressing Towards Goal

## 2022-03-24 NOTE — PROGRESS NOTES
Problem: Falls - Risk of  Goal: *Absence of Falls  Description: Document Nesha Armendariz Fall Risk and appropriate interventions in the flowsheet.   3/24/2022 1651 by Zeenat Sen RN  Outcome: Resolved/Met  3/24/2022 1404 by Zeenat Sen RN  Outcome: Progressing Towards Goal  Note: Fall Risk Interventions:            Medication Interventions: Teach patient to arise slowly                   Problem: Patient Education: Go to Patient Education Activity  Goal: Patient/Family Education  3/24/2022 1651 by Zeenat Sen RN  Outcome: Resolved/Met  3/24/2022 1404 by Zeenat Sen RN  Outcome: Progressing Towards Goal     Problem: Chronic Renal Failure  Goal: *Fluid and electrolytes stabilized  3/24/2022 1651 by Zeenat Sen RN  Outcome: Resolved/Met  3/24/2022 1404 by Zeenat Sen RN  Outcome: Progressing Towards Goal

## 2022-03-28 LAB
ATRIAL RATE: 78 BPM
BACTERIA SPEC CULT: NORMAL
CALCULATED P AXIS, ECG09: 56 DEGREES
CALCULATED R AXIS, ECG10: 68 DEGREES
CALCULATED T AXIS, ECG11: 79 DEGREES
DIAGNOSIS, 93000: NORMAL
GRAM STN SPEC: NORMAL
GRAM STN SPEC: NORMAL
P-R INTERVAL, ECG05: 166 MS
Q-T INTERVAL, ECG07: 466 MS
QRS DURATION, ECG06: 88 MS
QTC CALCULATION (BEZET), ECG08: 531 MS
SERVICE CMNT-IMP: NORMAL
VENTRICULAR RATE, ECG03: 78 BPM

## 2022-03-29 LAB
ATRIAL RATE: 66 BPM
CALCULATED P AXIS, ECG09: 75 DEGREES
CALCULATED R AXIS, ECG10: 70 DEGREES
CALCULATED T AXIS, ECG11: 80 DEGREES
DIAGNOSIS, 93000: NORMAL
P-R INTERVAL, ECG05: 160 MS
Q-T INTERVAL, ECG07: 492 MS
QRS DURATION, ECG06: 84 MS
QTC CALCULATION (BEZET), ECG08: 515 MS
VENTRICULAR RATE, ECG03: 66 BPM

## 2022-03-30 NOTE — PROGRESS NOTES
Physician Progress Note      PATIENT:               Rip Landaverde  CSN #:                  [id number]  :                       1995  ADMIT DATE:       3/22/2022 1:55 PM  [address] DATE:        3/24/2022 5:38 PM  RESPONDING  PROVIDER #:        Jillene Dandy MD LI MD          QUERY TEXT:    Pt presented to ER due to dizziness and has encephalopathy documented. If possible, please document in progress notes and discharge summary further specificity regarding the type of encephalopathy:    The medical record reflects the following:    Risk Factors: Dementia/psychiatric disorder, UDS positive for THC, ESRD    Clinical Indicators:  > CT head and MRI- negative  > Per H&P-  We will have MRI of brain due to history of lupus  Still makes urine check UDS  May be due to uremia  Check ammonia level  May be due to uremia plus psychiatric disorder  > Per PN 3/23- Alert and oriented, Encephalopathy-mag due to uremia plus psychiatric disorder  > UDS positive for THC  > Per discharge summary- Encephalopathy- due to uremia. Resolved after PD, she is alert and oriented    Treatment: received continued neuro-checks, Zosyn for empiric treatment, check ammonia level    Thank you,  Isabela Ramos RN, BSN, CRCR  [email or web address]  Options provided:  -- Metabolic encephalopathy  -- Toxic encephalopathy  -- Other - I will add my own diagnosis  -- Disagree - Not applicable / Not valid  -- Disagree - Clinically unable to determine / Unknown  -- Refer to Clinical Documentation Reviewer    PROVIDER RESPONSE TEXT:    This patient has metabolic encephalopathy.     Query created by: Kb Priest on 3/28/2022 8:52 AM      Electronically signed by:  Jillene Dandy MD Loree Saba MD 3/30/2022 10:06 AM
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
## 2022-04-07 ENCOUNTER — HOSPITAL ENCOUNTER (OUTPATIENT)
Dept: PREADMISSION TESTING | Age: 27
Discharge: HOME | End: 2022-04-07

## 2022-04-07 VITALS — BODY MASS INDEX: 27.97 KG/M2 | HEIGHT: 66 IN | WEIGHT: 174 LBS

## 2022-04-07 RX ORDER — CEFAZOLIN SODIUM/WATER 2 G/20 ML
2 SYRINGE (ML) INTRAVENOUS ONCE
Status: CANCELLED | OUTPATIENT
Start: 2022-04-07 | End: 2022-04-07

## 2022-04-07 RX ORDER — SODIUM CHLORIDE 9 MG/ML
50 INJECTION, SOLUTION INTRAVENOUS CONTINUOUS
Status: CANCELLED | OUTPATIENT
Start: 2022-04-07

## 2022-04-07 NOTE — PERIOP NOTES
PAT - SURGICAL PRE-ADMISSION INSTRUCTIONS    NAME:  Jessica Rodriguez                                                          TODAY'S DATE:  4/7/2022    SURGERY DATE:  4/15/2022                                  SURGERY ARRIVAL TIME:   TBA    1. Do NOT eat or drink anything, including candy or gum, after MIDNIGHT on 4/15/2022 , unless you have specific instructions from your Surgeon or Anesthesia Provider to do so. 2. No smoking 24 hours before surgery. 3. No alcohol 24 hours prior to the day of surgery. 4. No recreational drugs for one week prior to the day of surgery. 5. Leave all valuables, including money/purse, at home. 6. Remove all jewelry, nail polish, makeup (including mascara); no lotions, powders, deodorant, or perfume/cologne/after shave. 7. Glasses/Contact lenses and Dentures may be worn to the hospital.  They will be removed prior to surgery. 8. Call your doctor if symptoms of a cold or illness develop within 24 ours prior to surgery. 9. AN ADULT MUST DRIVE YOU HOME AFTER OUTPATIENT SURGERY. 10. If you are having an OUTPATIENT procedure, please make arrangements for a responsible adult to be with you for 24 hours after your surgery. 11. If you are admitted to the hospital, you will be assigned to a bed after surgery is complete. Normally a family member will not be able to see you until you are in your assigned bed. 12. Visitation Restrictions Explained. Special Instructions:  Covid Testnot needed. Patient vaccinated, Quarantine requirements discussed  No Advanced Directive or DNR  Bring inhaler. , Take these medications the morning of surgery with a sip of water:  divalproex,labetalol,leothyroxine, pantoprazole,escitalopram oxalate    Patient Prep:    shower with anti-bacterial soap     These surgical instructions were reviewed with patient mother during the PAT phone call

## 2022-04-14 ENCOUNTER — ANESTHESIA EVENT (OUTPATIENT)
Dept: SURGERY | Age: 27
End: 2022-04-14
Payer: MEDICARE

## 2022-04-14 NOTE — ANESTHESIA PREPROCEDURE EVALUATION
Relevant Problems   NEUROLOGY   (+) Psychiatric disorder      CARDIOVASCULAR   (+) Hypertension      GASTROINTESTINAL   (+) GERD (gastroesophageal reflux disease)      RENAL FAILURE   (+) ESRD on dialysis [facility])       Anesthetic History        Pertinent negatives: No PONV       Review of Systems / Medical History  Patient summary reviewed, nursing notes reviewed and pertinent labs reviewed    Pulmonary          Smoker (current tobacco and marijuana smoker)  Asthma (moderate, persistent, rare albuterol use)        Neuro/Psych         Headaches (migraines) and psychiatric history (anxiety, depression, bipolar)     Cardiovascular    Hypertension          Hyperlipidemia    Exercise tolerance: >4 METS  Comments: Echo 3/2022: Left Ventricle: Left ventricle size is normal. Mildly increased wall thickness. Normal wall motion. Normal left ventricular systolic function with a visually estimated EF of 55 - 60%. Diastolic dysfunction present with normal LV EF. Grade I diastolic dysfunction.   Left Atrium: Left atrium is mildly dilated.   Pulmonary artery : Estimated pulmonary arterial systolic pressure is 20 mmhg. Pulmonary hypertension not suggested by doppler findings.   Pericardium: Small (<1 cm) localized pericardial effusion present around the right ventricle and right atrium. No indication of cardiac tamponade.      GI/Hepatic/Renal     GERD: well controlled    Renal disease (secondary to lupus nephritis): ESRD and dialysis       Endo/Other      Hypothyroidism  Anemia (9.1/30, chronic secondary to renal failure )  Pertinent negatives: No obesity   Other Findings   Comments: Lupus    Plts 98k         Physical Exam    Airway  Mallampati: I  TM Distance: 4 - 6 cm  Neck ROM: normal range of motion, short neck   Mouth opening: Normal     Cardiovascular    Rhythm: regular  Rate: normal         Dental  No notable dental hx       Pulmonary  Breath sounds clear to auscultation               Abdominal  GI exam deferred Other Findings            Anesthetic Plan    ASA: 4  Anesthesia type: general          Induction: Intravenous  Anesthetic plan and risks discussed with: Patient      Plan GETA. Pt understands and accepts associated risks and agrees with plan. All questions answered. Consent signed. Home dialysis. Dialyzed yesterday. AM K+ pending.

## 2022-04-15 ENCOUNTER — HOSPITAL ENCOUNTER (OUTPATIENT)
Age: 27
Setting detail: OUTPATIENT SURGERY
Discharge: HOME | End: 2022-04-15
Attending: SURGERY | Admitting: SURGERY
Payer: MEDICARE

## 2022-04-15 ENCOUNTER — ANESTHESIA (OUTPATIENT)
Dept: SURGERY | Age: 27
End: 2022-04-15
Payer: MEDICARE

## 2022-04-15 VITALS
BODY MASS INDEX: 28.09 KG/M2 | DIASTOLIC BLOOD PRESSURE: 99 MMHG | OXYGEN SATURATION: 99 % | RESPIRATION RATE: 18 BRPM | HEIGHT: 66 IN | SYSTOLIC BLOOD PRESSURE: 147 MMHG | TEMPERATURE: 97.7 F | WEIGHT: 174.8 LBS | HEART RATE: 72 BPM

## 2022-04-15 DIAGNOSIS — Z98.890 S/P LAPAROSCOPY: Primary | ICD-10-CM

## 2022-04-15 LAB
HCG UR QL: NEGATIVE
POTASSIUM SERPL-SCNC: 4.6 MMOL/L (ref 3.5–5.5)

## 2022-04-15 PROCEDURE — 77030009403 HC ELECTRD ENDO MEGA -B: Performed by: SURGERY

## 2022-04-15 PROCEDURE — 77030008477 HC STYL SATN SLP COVD -A: Performed by: ANESTHESIOLOGY

## 2022-04-15 PROCEDURE — 84132 ASSAY OF SERUM POTASSIUM: CPT

## 2022-04-15 PROCEDURE — 77030002933 HC SUT MCRYL J&J -A: Performed by: SURGERY

## 2022-04-15 PROCEDURE — 77030012969 HC TBNG DLYS BAXT -B: Performed by: SURGERY

## 2022-04-15 PROCEDURE — 76210000021 HC REC RM PH II 0.5 TO 1 HR: Performed by: SURGERY

## 2022-04-15 PROCEDURE — 77030031139 HC SUT VCRL2 J&J -A: Performed by: SURGERY

## 2022-04-15 PROCEDURE — 77030010507 HC ADH SKN DERMBND J&J -B: Performed by: SURGERY

## 2022-04-15 PROCEDURE — 77030008518 HC TBNG INSUF ENDO STRY -B: Performed by: SURGERY

## 2022-04-15 PROCEDURE — 74011250636 HC RX REV CODE- 250/636: Performed by: SURGERY

## 2022-04-15 PROCEDURE — 74011000250 HC RX REV CODE- 250: Performed by: ANESTHESIOLOGY

## 2022-04-15 PROCEDURE — 77030020782 HC GWN BAIR PAWS FLX 3M -B: Performed by: SURGERY

## 2022-04-15 PROCEDURE — 76010000149 HC OR TIME 1 TO 1.5 HR: Performed by: SURGERY

## 2022-04-15 PROCEDURE — 77030008683 HC TU ET CUF COVD -A: Performed by: ANESTHESIOLOGY

## 2022-04-15 PROCEDURE — 81025 URINE PREGNANCY TEST: CPT

## 2022-04-15 PROCEDURE — 76060000033 HC ANESTHESIA 1 TO 1.5 HR: Performed by: SURGERY

## 2022-04-15 PROCEDURE — C1750 CATH, HEMODIALYSIS,LONG-TERM: HCPCS | Performed by: SURGERY

## 2022-04-15 PROCEDURE — 77030020829: Performed by: SURGERY

## 2022-04-15 PROCEDURE — 36415 COLL VENOUS BLD VENIPUNCTURE: CPT

## 2022-04-15 PROCEDURE — 77030012770 HC TRCR OPT FX AMR -B: Performed by: SURGERY

## 2022-04-15 PROCEDURE — 2709999900 HC NON-CHARGEABLE SUPPLY: Performed by: SURGERY

## 2022-04-15 PROCEDURE — 77030004495 HC ADPT PERI DLYS BAXT -C: Performed by: SURGERY

## 2022-04-15 PROCEDURE — 74011250636 HC RX REV CODE- 250/636: Performed by: ANESTHESIOLOGY

## 2022-04-15 PROCEDURE — 77030008596 HC TRCR ENDOSC AMR -B: Performed by: SURGERY

## 2022-04-15 PROCEDURE — 74011000250 HC RX REV CODE- 250: Performed by: SURGERY

## 2022-04-15 PROCEDURE — 76210000063 HC OR PH I REC FIRST 0.5 HR: Performed by: SURGERY

## 2022-04-15 PROCEDURE — 77030040361 HC SLV COMPR DVT MDII -B: Performed by: SURGERY

## 2022-04-15 PROCEDURE — 77030018836 HC SOL IRR NACL ICUM -A: Performed by: SURGERY

## 2022-04-15 PROCEDURE — 77010033678 HC OXYGEN DAILY

## 2022-04-15 PROCEDURE — 77030019895 HC PCKNG STRP IODO -A: Performed by: SURGERY

## 2022-04-15 PROCEDURE — 77030006643: Performed by: ANESTHESIOLOGY

## 2022-04-15 RX ORDER — HYDROMORPHONE HYDROCHLORIDE 2 MG/ML
INJECTION, SOLUTION INTRAMUSCULAR; INTRAVENOUS; SUBCUTANEOUS AS NEEDED
Status: DISCONTINUED | OUTPATIENT
Start: 2022-04-15 | End: 2022-04-15 | Stop reason: HOSPADM

## 2022-04-15 RX ORDER — LIDOCAINE HYDROCHLORIDE 20 MG/ML
INJECTION, SOLUTION EPIDURAL; INFILTRATION; INTRACAUDAL; PERINEURAL AS NEEDED
Status: DISCONTINUED | OUTPATIENT
Start: 2022-04-15 | End: 2022-04-15 | Stop reason: HOSPADM

## 2022-04-15 RX ORDER — ONDANSETRON 2 MG/ML
INJECTION INTRAMUSCULAR; INTRAVENOUS AS NEEDED
Status: DISCONTINUED | OUTPATIENT
Start: 2022-04-15 | End: 2022-04-15 | Stop reason: HOSPADM

## 2022-04-15 RX ORDER — SODIUM CHLORIDE 9 MG/ML
50 INJECTION, SOLUTION INTRAVENOUS CONTINUOUS
Status: DISCONTINUED | OUTPATIENT
Start: 2022-04-15 | End: 2022-04-15 | Stop reason: HOSPADM

## 2022-04-15 RX ORDER — GLYCOPYRROLATE 0.2 MG/ML
INJECTION INTRAMUSCULAR; INTRAVENOUS AS NEEDED
Status: DISCONTINUED | OUTPATIENT
Start: 2022-04-15 | End: 2022-04-15 | Stop reason: HOSPADM

## 2022-04-15 RX ORDER — DEXAMETHASONE SODIUM PHOSPHATE 4 MG/ML
INJECTION, SOLUTION INTRA-ARTICULAR; INTRALESIONAL; INTRAMUSCULAR; INTRAVENOUS; SOFT TISSUE AS NEEDED
Status: DISCONTINUED | OUTPATIENT
Start: 2022-04-15 | End: 2022-04-15 | Stop reason: HOSPADM

## 2022-04-15 RX ORDER — ROCURONIUM BROMIDE 10 MG/ML
INJECTION, SOLUTION INTRAVENOUS AS NEEDED
Status: DISCONTINUED | OUTPATIENT
Start: 2022-04-15 | End: 2022-04-15 | Stop reason: HOSPADM

## 2022-04-15 RX ORDER — NALOXONE HYDROCHLORIDE 0.4 MG/ML
0.4 INJECTION, SOLUTION INTRAMUSCULAR; INTRAVENOUS; SUBCUTANEOUS AS NEEDED
Status: DISCONTINUED | OUTPATIENT
Start: 2022-04-15 | End: 2022-04-15 | Stop reason: HOSPADM

## 2022-04-15 RX ORDER — HEPARIN SODIUM 10000 [USP'U]/ML
INJECTION, SOLUTION INTRAVENOUS; SUBCUTANEOUS AS NEEDED
Status: DISCONTINUED | OUTPATIENT
Start: 2022-04-15 | End: 2022-04-15 | Stop reason: HOSPADM

## 2022-04-15 RX ORDER — FLUMAZENIL 0.1 MG/ML
0.2 INJECTION INTRAVENOUS
Status: DISCONTINUED | OUTPATIENT
Start: 2022-04-15 | End: 2022-04-15 | Stop reason: HOSPADM

## 2022-04-15 RX ORDER — MIDAZOLAM HYDROCHLORIDE 1 MG/ML
INJECTION, SOLUTION INTRAMUSCULAR; INTRAVENOUS AS NEEDED
Status: DISCONTINUED | OUTPATIENT
Start: 2022-04-15 | End: 2022-04-15 | Stop reason: HOSPADM

## 2022-04-15 RX ORDER — SODIUM CHLORIDE, SODIUM LACTATE, POTASSIUM CHLORIDE, CALCIUM CHLORIDE 600; 310; 30; 20 MG/100ML; MG/100ML; MG/100ML; MG/100ML
125 INJECTION, SOLUTION INTRAVENOUS CONTINUOUS
Status: DISCONTINUED | OUTPATIENT
Start: 2022-04-15 | End: 2022-04-15 | Stop reason: HOSPADM

## 2022-04-15 RX ORDER — CEFAZOLIN SODIUM/WATER 2 G/20 ML
2 SYRINGE (ML) INTRAVENOUS ONCE
Status: COMPLETED | OUTPATIENT
Start: 2022-04-15 | End: 2022-04-15

## 2022-04-15 RX ORDER — NEOSTIGMINE METHYLSULFATE 1 MG/ML
INJECTION, SOLUTION INTRAVENOUS AS NEEDED
Status: DISCONTINUED | OUTPATIENT
Start: 2022-04-15 | End: 2022-04-15 | Stop reason: HOSPADM

## 2022-04-15 RX ORDER — CEFAZOLIN SODIUM/WATER 2 G/20 ML
2 SYRINGE (ML) INTRAVENOUS ONCE
Status: CANCELLED | OUTPATIENT
Start: 2022-04-15 | End: 2022-04-15

## 2022-04-15 RX ORDER — FENTANYL CITRATE 50 UG/ML
INJECTION, SOLUTION INTRAMUSCULAR; INTRAVENOUS AS NEEDED
Status: DISCONTINUED | OUTPATIENT
Start: 2022-04-15 | End: 2022-04-15 | Stop reason: HOSPADM

## 2022-04-15 RX ORDER — HYDROMORPHONE HYDROCHLORIDE 1 MG/ML
0.5 INJECTION, SOLUTION INTRAMUSCULAR; INTRAVENOUS; SUBCUTANEOUS
Status: DISCONTINUED | OUTPATIENT
Start: 2022-04-15 | End: 2022-04-15 | Stop reason: HOSPADM

## 2022-04-15 RX ORDER — ONDANSETRON 8 MG/1
8 TABLET, ORALLY DISINTEGRATING ORAL
Qty: 12 TABLET | Refills: 0 | Status: SHIPPED | OUTPATIENT
Start: 2022-04-15 | End: 2022-04-22

## 2022-04-15 RX ORDER — OXYCODONE AND ACETAMINOPHEN 5; 325 MG/1; MG/1
1 TABLET ORAL
Qty: 18 TABLET | Refills: 0 | Status: SHIPPED | OUTPATIENT
Start: 2022-04-15 | End: 2022-04-22

## 2022-04-15 RX ORDER — FENTANYL CITRATE 50 UG/ML
50 INJECTION, SOLUTION INTRAMUSCULAR; INTRAVENOUS AS NEEDED
Status: DISCONTINUED | OUTPATIENT
Start: 2022-04-15 | End: 2022-04-15 | Stop reason: HOSPADM

## 2022-04-15 RX ORDER — SODIUM CHLORIDE 0.9 % (FLUSH) 0.9 %
5-40 SYRINGE (ML) INJECTION AS NEEDED
Status: DISCONTINUED | OUTPATIENT
Start: 2022-04-15 | End: 2022-04-15 | Stop reason: HOSPADM

## 2022-04-15 RX ORDER — PROPOFOL 10 MG/ML
INJECTION, EMULSION INTRAVENOUS AS NEEDED
Status: DISCONTINUED | OUTPATIENT
Start: 2022-04-15 | End: 2022-04-15 | Stop reason: HOSPADM

## 2022-04-15 RX ORDER — SODIUM CHLORIDE 0.9 % (FLUSH) 0.9 %
5-40 SYRINGE (ML) INJECTION EVERY 8 HOURS
Status: DISCONTINUED | OUTPATIENT
Start: 2022-04-15 | End: 2022-04-15 | Stop reason: HOSPADM

## 2022-04-15 RX ADMIN — FENTANYL CITRATE 100 MCG: 50 INJECTION, SOLUTION INTRAMUSCULAR; INTRAVENOUS at 13:46

## 2022-04-15 RX ADMIN — PROPOFOL 200 MG: 10 INJECTION, EMULSION INTRAVENOUS at 13:50

## 2022-04-15 RX ADMIN — FENTANYL CITRATE 50 MCG: 50 INJECTION, SOLUTION INTRAMUSCULAR; INTRAVENOUS at 15:19

## 2022-04-15 RX ADMIN — HYDROMORPHONE HYDROCHLORIDE 0.5 MG: 2 INJECTION, SOLUTION INTRAMUSCULAR; INTRAVENOUS; SUBCUTANEOUS at 14:11

## 2022-04-15 RX ADMIN — SODIUM CHLORIDE 50 ML/HR: 9 INJECTION, SOLUTION INTRAVENOUS at 11:04

## 2022-04-15 RX ADMIN — Medication 3 MG: at 14:32

## 2022-04-15 RX ADMIN — LIDOCAINE HYDROCHLORIDE 100 MG: 20 INJECTION, SOLUTION INTRAVENOUS at 13:50

## 2022-04-15 RX ADMIN — GLYCOPYRROLATE 0.4 MG: 0.2 INJECTION INTRAMUSCULAR; INTRAVENOUS at 14:32

## 2022-04-15 RX ADMIN — MIDAZOLAM 2 MG: 1 INJECTION INTRAMUSCULAR; INTRAVENOUS at 13:43

## 2022-04-15 RX ADMIN — Medication 2 G: at 13:57

## 2022-04-15 RX ADMIN — ROCURONIUM BROMIDE 10 MG: 10 INJECTION, SOLUTION INTRAVENOUS at 13:50

## 2022-04-15 RX ADMIN — ROCURONIUM BROMIDE 10 MG: 10 INJECTION, SOLUTION INTRAVENOUS at 13:53

## 2022-04-15 RX ADMIN — DEXAMETHASONE SODIUM PHOSPHATE 4 MG: 4 INJECTION, SOLUTION INTRAMUSCULAR; INTRAVENOUS at 14:07

## 2022-04-15 RX ADMIN — ONDANSETRON HYDROCHLORIDE 4 MG: 2 INJECTION INTRAMUSCULAR; INTRAVENOUS at 14:14

## 2022-04-15 RX ADMIN — HYDROMORPHONE HYDROCHLORIDE 0.5 MG: 1 INJECTION, SOLUTION INTRAMUSCULAR; INTRAVENOUS; SUBCUTANEOUS at 15:12

## 2022-04-15 NOTE — DISCHARGE INSTRUCTIONS
Laparoscopy: What to Expect at [address] [place] [address]  After laparoscopic surgery, you are likely to have pain for the next several days. You may have a low fever and feel tired and sick to your stomach. This is common. You should feel better after 1 to 2 weeks. This care sheet gives you a general idea about how long it will take for you to recover. But each person recovers at a different pace. Follow the steps below to get better as quickly as possible. How can you care for yourself at home? Activity    · Rest when you feel tired. Getting enough sleep will help you recover.     · Try to walk each day. Start by walking a little more than you did the day before. Bit by bit, increase the amount you walk. Walking boosts blood flow and helps prevent pneumonia and constipation.     · Avoid strenuous activities, such as bicycle riding, jogging, weight lifting, or aerobic exercise, until your doctor says it is okay.     · Avoid lifting anything that would make you strain. This may include a child, heavy grocery bags and milk containers, a heavy briefcase or backpack, cat litter or dog food bags, or a vacuum .     · You may also have some shoulder or back pain. This pain is caused by the gas your doctor used to inflate your belly to help see your organs better. The pain usually lasts about 1 or 2 days.     · You may drive when you are no longer taking pain medicine and can quickly move your foot from the gas pedal to the brake. You must also be able to sit comfortably for a long period of time, even if you do not plan to go far. You might get caught in traffic.     · You may need to take a few days to a few weeks off work. It depends on the type of work you do and how you feel.     · You may shower 24 to 48 hours after surgery, if your doctor okays it. Pat the cut (incision) dry. Do not take a bath for the first 2 weeks, or until your doctor tells you it is okay.    Diet    · If your stomach is upset, try bland, low-fat foods such as plain rice, broiled chicken, toast, and yogurt.     · Drink plenty of fluids to prevent dehydration. Choose water and other clear liquids. If you have kidney, heart, or liver disease and have to limit fluids, talk with your doctor before you increase the amount of fluids you drink.     · You may notice that your bowel movements are not regular right after your surgery. This is common. Avoid constipation and straining with bowel movements. You may want to take a fiber supplement every day. If you have not had a bowel movement after a couple of days, ask your doctor about taking a mild laxative. Medicines    · Your doctor will tell you if and when you can restart your medicines. You will also get instructions about taking any new medicines.     · If you take aspirin or some other blood thinner, ask your doctor if and when to start taking it again. Make sure that you understand exactly what your doctor wants you to do.     · Take pain medicines exactly as directed. ? If the doctor gave you a prescription medicine for pain, take it as prescribed. ? If you are not taking a prescription pain medicine, ask your doctor if you can take an over-the-counter medicine.     · If your doctor prescribed antibiotics, take them as directed. Do not stop taking them just because you feel better. You need to take the full course of antibiotics.     · If you think your pain medicine is making you sick to your stomach:  ? Take your medicine after meals (unless your doctor has told you not to). ? Ask your doctor for a different pain medicine. Incision care    · If you have strips of tape on the incision, leave the tape on for a week or until it falls off.     · Wash the area daily with warm, soapy water and pat it dry. Don't use hydrogen peroxide or alcohol, which can slow healing. You may cover the area with a gauze bandage if it weeps or rubs against clothing. Change the bandage every day.    Follow-up care is a key part of your treatment and safety. Be sure to make and go to all appointments, and call your doctor if you are having problems. It's also a good idea to know your test results and keep a list of the medicines you take. When should you call for help? Call 911 anytime you think you may need emergency care. For example, call if:    · You passed out (lost consciousness).     · You are short of breath. Call your doctor now or seek immediate medical care if:    · You have pain that does not get better after you take pain medicine.     · You have loose stitches, or your incision comes open.     · Bright red blood has soaked through your bandage.     · You have signs of infection, such as:  ? Increased pain, swelling, warmth, or redness. ? Red streaks leading from the incision. ? Pus draining from the incision. ? A fever.     · You are sick to your stomach or cannot keep fluids down.     · You have signs of a blood clot in your leg (called a deep vein thrombosis), such as:  ? Pain in your calf, back of the knee, thigh, or groin. ? Redness and swelling in your leg or groin.     · You cannot pass stools or gas. Watch closely for any changes in your health, and be sure to contact your doctor if you have any problems. Where can you learn more? Go to http://www.gray.com/  Enter G657 in the search box to learn more about \"Laparoscopy: What to Expect at Home. \"  Current as of: September 8, 2021               Content Version: 13.2  © 2006-2022 [employer]. Care instructions adapted under license by [employer] (which disclaims liability or warranty for this information). If you have questions about a medical condition or this instruction, always ask your healthcare professional. [place] [address] any warranty or liability for your use of this information.          Peritoneal Dialysis Catheter Care: Care Instructions  Overview     Dialysis does the work of your kidneys when you have kidney failure. It filters wastes and removes extra fluid. It also keeps the right balance of chemicals in your blood. Peritoneal dialysis uses the lining of your belly to filter your blood. Before you start dialysis, your doctor creates a dialysis access. This is the place where the dialysis solution can flow into and out of your body. To make the access, the doctor places a soft tube in your belly or chest. This tube is called a catheter. When you do dialysis, the solution flows into your belly and stays there for several hours. Then you remove it through the catheter. It is important to take care of the catheter and the access area to prevent infection. Follow-up care is a key part of your treatment and safety. Be sure to make and go to all appointments, and call your doctor if you are having problems. It's also a good idea to know your test results and keep a list of the medicines you take. How can you care for yourself at home? Care of the catheter and access  · After the doctor creates your access, keep the bandage dry and clean. Change a dirty or bloody bandage. · Keep your access area clean and dry. Check it every day for signs of infection. · Always clean and dry your catheter and access area right away after you get wet. · Always wash your hands before you touch the catheter. · Fasten or tape the catheter to your body to keep it from catching on your clothes. · Never use scissors or other sharp objects around your catheter. · Do not use unapproved clamps on your catheter. · Store your dialysis supplies in a cool, dry place. Activity when you have an access  · Do not lift heavy objects. · Do not swim or take a bath unless your doctor tells you it is okay. When should you call for help? Call your doctor now or seek immediate medical care if:    · You have signs of infection, such as:  ? Increased pain, swelling, warmth, or redness.   ? Red streaks leading from the access area. ? Pus draining from the access area. ? A fever.     · You have belly pain.     · You have nausea or vomiting.     · The dialysis fluid looks cloudy or is a different color.     · Fluid does not flow through the catheter. Watch closely for changes in your health, and be sure to contact your doctor if you have any problems. Where can you learn more? Go to http://www.gray.com/  Enter R310 in the search box to learn more about \"Peritoneal Dialysis Catheter Care: Care Instructions. \"  Current as of: September 8, 2021               Content Version: 13.2  © 6814-1060 [employer]. Care instructions adapted under license by [employer] (which disclaims liability or warranty for this information). If you have questions about a medical condition or this instruction, always ask your healthcare professional. [address] any warranty or liability for your use of this information. DISCHARGE SUMMARY from Nurse    PATIENT INSTRUCTIONS:    After general anesthesia or intravenous sedation, for 24 hours or while taking prescription Narcotics:  · Limit your activities  · Do not drive and operate hazardous machinery  · Do not make important personal or business decisions  · Do  not drink alcoholic beverages  · If you have not urinated within 8 hours after discharge, please contact your surgeon on call.     Report the following to your surgeon:  · Excessive pain, swelling, redness or odor of or around the surgical area  · Temperature over 100.5  · Nausea and vomiting lasting longer than 4 hours or if unable to take medications  · Any signs of decreased circulation or nerve impairment to extremity: change in color, persistent  numbness, tingling, coldness or increase pain  · Any questions    What to do at Home:  Recommended activity: Activity as tolerated,     If you experience any of the following symptoms Excessive bleeding or discharge from incision sites, excessive pain not relieved with pain medication, any foul smelling/pus from incisions, any chills/fever over 101, please follow up with Dr. Valentina Henderson.    *  Please give a list of your current medications to your Primary Care Provider. *  Please update this list whenever your medications are discontinued, doses are      changed, or new medications (including over-the-counter products) are added. *  Please carry medication information at all times in case of emergency situations. These are general instructions for a healthy lifestyle:    No smoking/ No tobacco products/ Avoid exposure to second hand smoke  Surgeon General's Warning:  Quitting smoking now greatly reduces serious risk to your health. Obesity, smoking, and sedentary lifestyle greatly increases your risk for illness    A healthy diet, regular physical exercise & weight monitoring are important for maintaining a healthy lifestyle    You may be retaining fluid if you have a history of heart failure or if you experience any of the following symptoms:  Weight gain of 3 pounds or more overnight or 5 pounds in a week, increased swelling in our hands or feet or shortness of breath while lying flat in bed. Please call your doctor as soon as you notice any of these symptoms; do not wait until your next office visit. Patient armband removed and shredded        The discharge information has been reviewed with the patient and caregiver. The patient and caregiver verbalized understanding. Discharge medications reviewed with the patient and caregiver and appropriate educational materials and side effects teaching were provided.   ___________________________________________________________________________________________________________________________________

## 2022-04-15 NOTE — PERIOP NOTES
Discharge instructions given to patient and SO with understanding confirmed verbally. Written instructions given to SO to take home.

## 2022-04-15 NOTE — PERIOP NOTES
Pt stated that she last completed dialysis at home approx 2200 on 04/14/22. K+pending       . Reviewed PTA medication list with patient/caregiver and patient/caregiver denies any additional medications. Patient admits to having a responsible adult care for them at home for at least 24 hours after surgery. Patient encouraged to use gown warming system and informed that using said warming gown to regulate body temperature prior to a procedure has been shown to help reduce the risks of blood clots and infection. Patient's pharmacy of choice verified and documented in PTA medication section. Dual skin assessment & fall risk band verification completed with VIKASH Chen.

## 2022-04-15 NOTE — INTERVAL H&P NOTE
Update History & Physical    The Patient's History and Physical of April 15,   2022 was reviewed with the patient and I examined the patient. There was no change. The surgical site was confirmed by the patient and me. Plan:  The risk, benefits, expected outcome, and alternative to the recommended procedure have been discussed with the patient. Patient understands and wants to proceed with the procedure.     Electronically signed by Moni Bolden MD on 4/15/2022 at 1:26 PM

## 2022-04-15 NOTE — BRIEF OP NOTE
Brief Postoperative Note    Patient: Juan M Elizabeth  YOB: 1995  MRN: [id number]    Date of Procedure: 4/15/2022     Pre-Op Diagnosis: END STAGE RENAL DISEASE ON DIALYSIS, PD CATHETER DYSFUNCTION, PELVIC PAIN, PERITONEAL ADHESIONS    Post-Op Diagnosis: Same as preoperative diagnosis.       Procedure(s):  LAPAROSCOPY, LYSIS OF ADHESIONS, REMOVAL OF PERITONEAL DIALYSIS CATHETER, INSERTION OF NEW PERITONEAL DIALYSIS CATHETER    Surgeon(s):  Darlene Reilly MD    Surgical Assistant: Surg Asst-1: Enriqueta Bee    Anesthesia: General     Estimated Blood Loss (mL): Minimal    Complications: None    Specimens: * No specimens in log *     Implants: * No implants in log *    Drains: * No LDAs found *    Findings: SEE OP NOTE    Electronically Signed by Sofía Contreras MD on 4/15/2022 at 3:33 PM    Op note dictated #[id number]  RP

## 2022-04-16 NOTE — ANESTHESIA POSTPROCEDURE EVALUATION
Post-Anesthesia Evaluation and Assessment    Cardiovascular Function/Vital Signs  Visit Vitals  BP (!) 147/99   Pulse 72   Temp 36.5 °C (97.7 °F)   Resp 18   Ht 5' 6\" (1.676 m)   Wt 79.3 kg (174 lb 12.8 oz)   SpO2 99%   BMI 28.21 kg/m²       Patient is status post Procedure(s):  LAPAROSCOPY, LYSIS OF ADHESIONS,  REMOVAL OF PERITONEAL DIALYSIS CATHETER, INSERTION OF NEW PERITONEAL DIALYSIS CATHETER. Nausea/Vomiting: Controlled. Postoperative hydration reviewed and adequate. Pain:  Pain Scale 1: Numeric (0 - 10) (04/15/22 1636)  Pain Intensity 1: 3 (04/15/22 1636)   Managed. Neurological Status:   Neuro (WDL): Within Defined Limits (04/15/22 1636)   At baseline. Mental Status and Level of Consciousness: Arousable. Pulmonary Status:   O2 Device: None (Room air) (04/15/22 1636)   Adequate oxygenation and airway patent. Complications related to anesthesia: None    Post-anesthesia assessment completed. No concerns. Patient has met all discharge requirements.     Signed By: Jody Montenegro CRNA    April 15, 2022
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
## 2022-04-19 ENCOUNTER — APPOINTMENT (OUTPATIENT)
Dept: GENERAL RADIOLOGY | Age: 27
DRG: 919 | End: 2022-04-19
Attending: EMERGENCY MEDICINE
Payer: MEDICARE

## 2022-04-19 ENCOUNTER — APPOINTMENT (OUTPATIENT)
Dept: ULTRASOUND IMAGING | Age: 27
DRG: 919 | End: 2022-04-19
Attending: FAMILY MEDICINE
Payer: MEDICARE

## 2022-04-19 ENCOUNTER — HOSPITAL ENCOUNTER (INPATIENT)
Age: 27
LOS: 3 days | Discharge: HOME | DRG: 919 | End: 2022-04-22
Attending: EMERGENCY MEDICINE | Admitting: FAMILY MEDICINE
Payer: MEDICARE

## 2022-04-19 ENCOUNTER — APPOINTMENT (OUTPATIENT)
Dept: CT IMAGING | Age: 27
DRG: 919 | End: 2022-04-19
Attending: EMERGENCY MEDICINE
Payer: MEDICARE

## 2022-04-19 DIAGNOSIS — Z99.2 STAGE 5 CHRONIC KIDNEY DISEASE ON CHRONIC DIALYSIS (HCC): ICD-10-CM

## 2022-04-19 DIAGNOSIS — S36.81XA PERITONEAL HEMATOMA: ICD-10-CM

## 2022-04-19 DIAGNOSIS — D64.9 ANEMIA, UNSPECIFIED TYPE: Primary | ICD-10-CM

## 2022-04-19 DIAGNOSIS — T85.611A PERITONEAL DIALYSIS CATHETER DYSFUNCTION, INITIAL ENCOUNTER (HCC): ICD-10-CM

## 2022-04-19 DIAGNOSIS — N18.6 STAGE 5 CHRONIC KIDNEY DISEASE ON CHRONIC DIALYSIS (HCC): ICD-10-CM

## 2022-04-19 DIAGNOSIS — K66.1 HEMOPERITONEUM: ICD-10-CM

## 2022-04-19 PROBLEM — I10 UNCONTROLLED HYPERTENSION: Status: ACTIVE | Noted: 2022-04-19

## 2022-04-19 PROBLEM — R58 ACUTE BLEEDING: Status: ACTIVE | Noted: 2022-04-19

## 2022-04-19 PROBLEM — M32.14 LUPUS NEPHRITIS (HCC): Status: ACTIVE | Noted: 2022-04-19

## 2022-04-19 LAB
ALBUMIN SERPL-MCNC: 1.9 G/DL (ref 3.4–5)
ALBUMIN SERPL-MCNC: 2 G/DL (ref 3.4–5)
ALBUMIN/GLOB SERPL: 0.5 {RATIO} (ref 0.8–1.7)
ALBUMIN/GLOB SERPL: 0.5 {RATIO} (ref 0.8–1.7)
ALP SERPL-CCNC: 59 U/L (ref 45–117)
ALP SERPL-CCNC: 64 U/L (ref 45–117)
ALT SERPL-CCNC: <6 U/L (ref 13–56)
ALT SERPL-CCNC: <6 U/L (ref 13–56)
ANION GAP SERPL CALC-SCNC: 13 MMOL/L (ref 3–18)
ANION GAP SERPL CALC-SCNC: 6 MMOL/L (ref 3–18)
APPEARANCE UR: ABNORMAL
APPEARANCE UR: CLEAR
APTT PPP: 52.4 SEC (ref 23–36.4)
AST SERPL-CCNC: 11 U/L (ref 10–38)
AST SERPL-CCNC: 12 U/L (ref 10–38)
BACTERIA URNS QL MICRO: ABNORMAL /HPF
BACTERIA URNS QL MICRO: ABNORMAL /HPF
BASOPHILS # BLD: 0 K/UL (ref 0–0.1)
BASOPHILS # BLD: 0 K/UL (ref 0–0.1)
BASOPHILS NFR BLD: 0 % (ref 0–2)
BASOPHILS NFR BLD: 0 % (ref 0–2)
BILIRUB SERPL-MCNC: 0.5 MG/DL (ref 0.2–1)
BILIRUB SERPL-MCNC: 0.7 MG/DL (ref 0.2–1)
BILIRUB UR QL: NEGATIVE
BILIRUB UR QL: NEGATIVE
BUN SERPL-MCNC: 72 MG/DL (ref 7–18)
BUN SERPL-MCNC: 95 MG/DL (ref 7–18)
BUN/CREAT SERPL: 5 (ref 12–20)
BUN/CREAT SERPL: 5 (ref 12–20)
CALCIUM SERPL-MCNC: 7.2 MG/DL (ref 8.5–10.1)
CALCIUM SERPL-MCNC: 8.2 MG/DL (ref 8.5–10.1)
CHLORIDE SERPL-SCNC: 101 MMOL/L (ref 100–111)
CHLORIDE SERPL-SCNC: 97 MMOL/L (ref 100–111)
CO2 SERPL-SCNC: 20 MMOL/L (ref 21–32)
CO2 SERPL-SCNC: 25 MMOL/L (ref 21–32)
COLOR UR: YELLOW
COLOR UR: YELLOW
CREAT SERPL-MCNC: 14.9 MG/DL (ref 0.6–1.3)
CREAT SERPL-MCNC: 20.3 MG/DL (ref 0.6–1.3)
DIFFERENTIAL METHOD BLD: ABNORMAL
DIFFERENTIAL METHOD BLD: ABNORMAL
EOSINOPHIL # BLD: 0 K/UL (ref 0–0.4)
EOSINOPHIL # BLD: 0.1 K/UL (ref 0–0.4)
EOSINOPHIL NFR BLD: 0 % (ref 0–5)
EOSINOPHIL NFR BLD: 2 % (ref 0–5)
EPITH CASTS URNS QL MICRO: ABNORMAL /LPF (ref 0–5)
EPITH CASTS URNS QL MICRO: ABNORMAL /LPF (ref 0–5)
ERYTHROCYTE [DISTWIDTH] IN BLOOD BY AUTOMATED COUNT: 14.6 % (ref 11.6–14.5)
ERYTHROCYTE [DISTWIDTH] IN BLOOD BY AUTOMATED COUNT: 14.6 % (ref 11.6–14.5)
GLOBULIN SER CALC-MCNC: 3.7 G/DL (ref 2–4)
GLOBULIN SER CALC-MCNC: 4 G/DL (ref 2–4)
GLUCOSE SERPL-MCNC: 85 MG/DL (ref 74–99)
GLUCOSE SERPL-MCNC: 86 MG/DL (ref 74–99)
GLUCOSE UR STRIP.AUTO-MCNC: NEGATIVE MG/DL
GLUCOSE UR STRIP.AUTO-MCNC: NEGATIVE MG/DL
HBV SURFACE AG SER QL: <0.1 INDEX
HBV SURFACE AG SER QL: NEGATIVE
HCT VFR BLD AUTO: 15.5 % (ref 35–45)
HCT VFR BLD AUTO: 16.8 % (ref 35–45)
HGB BLD-MCNC: 4.9 G/DL (ref 12–16)
HGB BLD-MCNC: 5.5 G/DL (ref 12–16)
HGB UR QL STRIP: ABNORMAL
HGB UR QL STRIP: ABNORMAL
HISTORY CHECKED?,CKHIST: NORMAL
HISTORY CHECKED?,CKHIST: NORMAL
IMM GRANULOCYTES # BLD AUTO: 0 K/UL
IMM GRANULOCYTES # BLD AUTO: 0.1 K/UL (ref 0–0.04)
IMM GRANULOCYTES NFR BLD AUTO: 0 %
IMM GRANULOCYTES NFR BLD AUTO: 1 % (ref 0–0.5)
INR PPP: 1.4 (ref 0.8–1.2)
KETONES UR QL STRIP.AUTO: NEGATIVE MG/DL
KETONES UR QL STRIP.AUTO: NEGATIVE MG/DL
LACTATE SERPL-SCNC: 0.5 MMOL/L (ref 0.4–2)
LEUKOCYTE ESTERASE UR QL STRIP.AUTO: ABNORMAL
LEUKOCYTE ESTERASE UR QL STRIP.AUTO: ABNORMAL
LIPASE SERPL-CCNC: 38 U/L (ref 73–393)
LYMPHOCYTES # BLD: 0.3 K/UL (ref 0.9–3.6)
LYMPHOCYTES # BLD: 0.3 K/UL (ref 0.9–3.6)
LYMPHOCYTES NFR BLD: 3 % (ref 21–52)
LYMPHOCYTES NFR BLD: 6 % (ref 21–52)
MCH RBC QN AUTO: 26.3 PG (ref 24–34)
MCH RBC QN AUTO: 27.4 PG (ref 24–34)
MCHC RBC AUTO-ENTMCNC: 31.6 G/DL (ref 31–37)
MCHC RBC AUTO-ENTMCNC: 32.7 G/DL (ref 31–37)
MCV RBC AUTO: 83.3 FL (ref 78–100)
MCV RBC AUTO: 83.6 FL (ref 78–100)
MONOCYTES # BLD: 0.4 K/UL (ref 0.05–1.2)
MONOCYTES # BLD: 0.5 K/UL (ref 0.05–1.2)
MONOCYTES NFR BLD: 5 % (ref 3–10)
MONOCYTES NFR BLD: 8 % (ref 3–10)
NEUTS SEG # BLD: 4.8 K/UL (ref 1.8–8)
NEUTS SEG # BLD: 7.7 K/UL (ref 1.8–8)
NEUTS SEG NFR BLD: 83 % (ref 40–73)
NEUTS SEG NFR BLD: 92 % (ref 40–73)
NITRITE UR QL STRIP.AUTO: NEGATIVE
NITRITE UR QL STRIP.AUTO: NEGATIVE
NRBC # BLD: 0 K/UL (ref 0–0.01)
NRBC # BLD: 0 K/UL (ref 0–0.01)
NRBC BLD-RTO: 0 PER 100 WBC
NRBC BLD-RTO: 0 PER 100 WBC
PH UR STRIP: 6.5 [PH] (ref 5–8)
PH UR STRIP: 7 [PH] (ref 5–8)
PLATELET # BLD AUTO: 101 K/UL (ref 135–420)
PLATELET # BLD AUTO: 98 K/UL (ref 135–420)
PLATELET COMMENTS,PCOM: ABNORMAL
POTASSIUM SERPL-SCNC: 4 MMOL/L (ref 3.5–5.5)
POTASSIUM SERPL-SCNC: 5.2 MMOL/L (ref 3.5–5.5)
PROCALCITONIN SERPL-MCNC: 6.43 NG/ML
PROT SERPL-MCNC: 5.7 G/DL (ref 6.4–8.2)
PROT SERPL-MCNC: 5.9 G/DL (ref 6.4–8.2)
PROT UR STRIP-MCNC: 100 MG/DL
PROT UR STRIP-MCNC: 100 MG/DL
PROTHROMBIN TIME: 16.6 SEC (ref 11.5–15.2)
RBC # BLD AUTO: 1.86 M/UL (ref 4.2–5.3)
RBC # BLD AUTO: 2.01 M/UL (ref 4.2–5.3)
RBC #/AREA URNS HPF: ABNORMAL /HPF (ref 0–5)
RBC #/AREA URNS HPF: ABNORMAL /HPF (ref 0–5)
RBC MORPH BLD: ABNORMAL
SODIUM SERPL-SCNC: 130 MMOL/L (ref 136–145)
SODIUM SERPL-SCNC: 132 MMOL/L (ref 136–145)
SP GR UR REFRACTOMETRY: 1.01 (ref 1–1.03)
SP GR UR REFRACTOMETRY: 1.01 (ref 1–1.03)
UROBILINOGEN UR QL STRIP.AUTO: 0.2 EU/DL (ref 0.2–1)
UROBILINOGEN UR QL STRIP.AUTO: 0.2 EU/DL (ref 0.2–1)
WBC # BLD AUTO: 5.8 K/UL (ref 4.6–13.2)
WBC # BLD AUTO: 8.4 K/UL (ref 4.6–13.2)
WBC URNS QL MICRO: ABNORMAL /HPF (ref 0–5)
WBC URNS QL MICRO: ABNORMAL /HPF (ref 0–5)

## 2022-04-19 PROCEDURE — 81001 URINALYSIS AUTO W/SCOPE: CPT

## 2022-04-19 PROCEDURE — 86923 COMPATIBILITY TEST ELECTRIC: CPT

## 2022-04-19 PROCEDURE — P9059 PLASMA, FRZ BETWEEN 8-24HOUR: HCPCS

## 2022-04-19 PROCEDURE — 85730 THROMBOPLASTIN TIME PARTIAL: CPT

## 2022-04-19 PROCEDURE — P9073 PLATELETS PHERESIS PATH REDU: HCPCS

## 2022-04-19 PROCEDURE — 83605 ASSAY OF LACTIC ACID: CPT

## 2022-04-19 PROCEDURE — 86900 BLOOD TYPING SEROLOGIC ABO: CPT

## 2022-04-19 PROCEDURE — 87040 BLOOD CULTURE FOR BACTERIA: CPT

## 2022-04-19 PROCEDURE — 74011000250 HC RX REV CODE- 250: Performed by: FAMILY MEDICINE

## 2022-04-19 PROCEDURE — 85025 COMPLETE CBC W/AUTO DIFF WBC: CPT

## 2022-04-19 PROCEDURE — 84145 PROCALCITONIN (PCT): CPT

## 2022-04-19 PROCEDURE — 74018 RADEX ABDOMEN 1 VIEW: CPT

## 2022-04-19 PROCEDURE — 36430 TRANSFUSION BLD/BLD COMPNT: CPT

## 2022-04-19 PROCEDURE — 83690 ASSAY OF LIPASE: CPT

## 2022-04-19 PROCEDURE — 90935 HEMODIALYSIS ONE EVALUATION: CPT

## 2022-04-19 PROCEDURE — P9016 RBC LEUKOCYTES REDUCED: HCPCS

## 2022-04-19 PROCEDURE — 74011000250 HC RX REV CODE- 250: Performed by: STUDENT IN AN ORGANIZED HEALTH CARE EDUCATION/TRAINING PROGRAM

## 2022-04-19 PROCEDURE — 86706 HEP B SURFACE ANTIBODY: CPT

## 2022-04-19 PROCEDURE — 30233N1 TRANSFUSION OF NONAUTOLOGOUS RED BLOOD CELLS INTO PERIPHERAL VEIN, PERCUTANEOUS APPROACH: ICD-10-PCS | Performed by: FAMILY MEDICINE

## 2022-04-19 PROCEDURE — 87340 HEPATITIS B SURFACE AG IA: CPT

## 2022-04-19 PROCEDURE — 80053 COMPREHEN METABOLIC PANEL: CPT

## 2022-04-19 PROCEDURE — 99285 EMERGENCY DEPT VISIT HI MDM: CPT

## 2022-04-19 PROCEDURE — 74011000258 HC RX REV CODE- 258: Performed by: INTERNAL MEDICINE

## 2022-04-19 PROCEDURE — 93005 ELECTROCARDIOGRAM TRACING: CPT

## 2022-04-19 PROCEDURE — 74176 CT ABD & PELVIS W/O CONTRAST: CPT

## 2022-04-19 PROCEDURE — 76856 US EXAM PELVIC COMPLETE: CPT

## 2022-04-19 PROCEDURE — 74011250636 HC RX REV CODE- 250/636: Performed by: FAMILY MEDICINE

## 2022-04-19 PROCEDURE — 5A1D70Z PERFORMANCE OF URINARY FILTRATION, INTERMITTENT, LESS THAN 6 HOURS PER DAY: ICD-10-PCS | Performed by: FAMILY MEDICINE

## 2022-04-19 PROCEDURE — 71045 X-RAY EXAM CHEST 1 VIEW: CPT

## 2022-04-19 PROCEDURE — 74011250637 HC RX REV CODE- 250/637: Performed by: FAMILY MEDICINE

## 2022-04-19 PROCEDURE — 74011250636 HC RX REV CODE- 250/636: Performed by: INTERNAL MEDICINE

## 2022-04-19 PROCEDURE — 85610 PROTHROMBIN TIME: CPT

## 2022-04-19 PROCEDURE — 65610000006 HC RM INTENSIVE CARE

## 2022-04-19 RX ORDER — SODIUM CHLORIDE 0.9 % (FLUSH) 0.9 %
5-40 SYRINGE (ML) INJECTION EVERY 8 HOURS
Status: DISCONTINUED | OUTPATIENT
Start: 2022-04-19 | End: 2022-04-22 | Stop reason: HOSPADM

## 2022-04-19 RX ORDER — SODIUM CHLORIDE 0.9 % (FLUSH) 0.9 %
5-40 SYRINGE (ML) INJECTION AS NEEDED
Status: DISCONTINUED | OUTPATIENT
Start: 2022-04-19 | End: 2022-04-22 | Stop reason: HOSPADM

## 2022-04-19 RX ORDER — ACETAMINOPHEN 325 MG/1
650 TABLET ORAL
Status: DISCONTINUED | OUTPATIENT
Start: 2022-04-19 | End: 2022-04-22 | Stop reason: HOSPADM

## 2022-04-19 RX ORDER — ACETAMINOPHEN 650 MG/1
650 SUPPOSITORY RECTAL
Status: DISCONTINUED | OUTPATIENT
Start: 2022-04-19 | End: 2022-04-22 | Stop reason: HOSPADM

## 2022-04-19 RX ORDER — ONDANSETRON 2 MG/ML
4 INJECTION INTRAMUSCULAR; INTRAVENOUS
Status: DISCONTINUED | OUTPATIENT
Start: 2022-04-19 | End: 2022-04-22 | Stop reason: HOSPADM

## 2022-04-19 RX ORDER — OXYCODONE HYDROCHLORIDE 5 MG/1
10 TABLET ORAL
Status: DISCONTINUED | OUTPATIENT
Start: 2022-04-19 | End: 2022-04-22

## 2022-04-19 RX ORDER — POLYETHYLENE GLYCOL 3350 17 G/17G
17 POWDER, FOR SOLUTION ORAL DAILY PRN
Status: DISCONTINUED | OUTPATIENT
Start: 2022-04-19 | End: 2022-04-22 | Stop reason: HOSPADM

## 2022-04-19 RX ORDER — HYDRALAZINE HYDROCHLORIDE 20 MG/ML
10 INJECTION INTRAMUSCULAR; INTRAVENOUS
Status: DISCONTINUED | OUTPATIENT
Start: 2022-04-19 | End: 2022-04-20

## 2022-04-19 RX ORDER — SODIUM CHLORIDE 9 MG/ML
250 INJECTION, SOLUTION INTRAVENOUS AS NEEDED
Status: DISCONTINUED | OUTPATIENT
Start: 2022-04-19 | End: 2022-04-21 | Stop reason: SDUPTHER

## 2022-04-19 RX ORDER — ONDANSETRON 4 MG/1
4 TABLET, ORALLY DISINTEGRATING ORAL
Status: DISCONTINUED | OUTPATIENT
Start: 2022-04-19 | End: 2022-04-22 | Stop reason: HOSPADM

## 2022-04-19 RX ORDER — FAMOTIDINE 20 MG/1
20 TABLET, FILM COATED ORAL DAILY
Status: DISCONTINUED | OUTPATIENT
Start: 2022-04-20 | End: 2022-04-22 | Stop reason: HOSPADM

## 2022-04-19 RX ORDER — BUMETANIDE 0.25 MG/ML
2 INJECTION INTRAMUSCULAR; INTRAVENOUS EVERY 12 HOURS
Status: DISCONTINUED | OUTPATIENT
Start: 2022-04-19 | End: 2022-04-22 | Stop reason: HOSPADM

## 2022-04-19 RX ADMIN — BUMETANIDE 2 MG: 0.25 INJECTION, SOLUTION INTRAMUSCULAR; INTRAVENOUS at 14:53

## 2022-04-19 RX ADMIN — BUMETANIDE 2 MG: 0.25 INJECTION, SOLUTION INTRAMUSCULAR; INTRAVENOUS at 21:33

## 2022-04-19 RX ADMIN — PHYTONADIONE 10 MG: 10 INJECTION, EMULSION INTRAMUSCULAR; INTRAVENOUS; SUBCUTANEOUS at 16:45

## 2022-04-19 RX ADMIN — SODIUM CHLORIDE, PRESERVATIVE FREE 10 ML: 5 INJECTION INTRAVENOUS at 21:34

## 2022-04-19 RX ADMIN — HYDRALAZINE HYDROCHLORIDE 10 MG: 20 INJECTION INTRAMUSCULAR; INTRAVENOUS at 23:07

## 2022-04-19 RX ADMIN — ACETAMINOPHEN 650 MG: 325 TABLET ORAL at 18:46

## 2022-04-19 RX ADMIN — SODIUM CHLORIDE, PRESERVATIVE FREE 10 ML: 5 INJECTION INTRAVENOUS at 16:46

## 2022-04-19 RX ADMIN — PIPERACILLIN AND TAZOBACTAM 3.38 G: 3; .375 INJECTION, POWDER, LYOPHILIZED, FOR SOLUTION INTRAVENOUS at 16:45

## 2022-04-19 RX ADMIN — ONDANSETRON 4 MG: 4 TABLET, ORALLY DISINTEGRATING ORAL at 18:46

## 2022-04-19 NOTE — H&P
History & Physical    Patient: James Raines MRN: [id number]  CSN: [id number]    YOB: 1995  Age: 32 y.o. Sex: female      DOA: 4/19/2022  Primary Care Provider:  Nicanor Muse MD    Assessment/Plan   James Raines is a 32 y.o. female who with h/o ESRD on peritoneal dialysis due to Lupus and Lupus nephritis, GERD, HTN and anemia of CKD and secondary hyperparathyroidism who underwent a diagnostic laparoscopy last Friday which was 4 days ago and a new PD catheter was placed and the other one was removed. Admitted for posthemorrhagic anemia, anemia of CKD, end-stage renal disease on peritoneal dialysis but has hemodialysis fistula     Symptomatic anemia  Anemia of CKD  End-stage renal disease on peritoneal dialysis but has hemodialysis fistula  Uncontrolled hypertension  Abdominal pain  Intraperitoneal bleed  Complex left adnexal lesion, very likely hemorrhagic  History of lupus nephritis  Peritoneal dialysis catheter dysfunction  Hyponatremia    CRITICAL CARE PLAN    Resp -no ssues with respiratory status, HOB>30 degrees    ID -. IV antibiotics empirically started wean as appropriate, follow up blood  cx. Trend temps, wbc    CVS -uncontrolled hypertension, resume home medications as appropriate, monitor HD    Heme/onc -symptomatic anemia due to intraperitoneal bleed, continue hemoglobin 4.9. Trend H&H, transfuse until greater than 7, thrombocytopenia> transfuse platelets follow H&H, plts. INR. Renal -stage renal disease on peritoneal dialysis but has hemodialysis fistula, will require urgent dialysis today, nephrology following, hemodialysis today through fistula, peritoneal dialysis catheter dysfunction, status post replacement of peritoneal dialysis 5 days ago, trend BUN, Cr, follow I/O, moreno in place. Check and replace Mg, K, phos.   Electrolyte abnormalities should be corrected by dialysis today    Endocrine -  Follow FSG    Neuro -no acute issues    Pain as needed pain management    Sedation -as needed for agitation    GI -abdominal pain likely secondary to intraperitoneal bleed and peritoneal dialysis dysfunction, general surgery consulted, Dr. Laurence Bolivar is aware and wants patient n.p.o. after midnight, will consider if exploratory laparoscopic procedure indicated       GYN -Complex left adnexal lesion, very likely hemorrhagic. Will need 6-8 week follow-up ultrasound as   outpatient to further evaluate and assess for resolution. Prophylaxis - DVT: heparin, GI: protonix    Patient Active Problem List   Diagnosis Code    Encephalopathy G93.40    Lupus ([address] M32.9    ESRD on dialysis ([address] N18.6, Z99.2    GERD (gastroesophageal reflux disease) K21.9    Hypertension I10    Thyroid disease E07.9    Psychiatric disorder F99    Anemia D64.9    Peritoneal hematoma S36.81XA    Peritoneal dialysis catheter dysfunction (HCC) T85.611A    Acute bleeding R58    Uncontrolled hypertension I10    Lupus nephritis ([address] M32.14     Estimated length of stay : 4 to 5 days    CC: abdominal pain with active bleeding via peritoneal dialysis port       HPI:     Marylou Springer is a 32 y.o. female who with h/o ESRD on peritoneal dialysis due to Lupus and Lupus nephritis, GERD, HTN and anemia of CKD and secondary hyperparathyroidism who underwent a diagnostic laparoscopy last Friday which was 4 days ago and a new PD catheter was placed and the other one was removed. Patient presented to the emergency department with abdominal pain and was found to have a hemoglobin of 4.9 likely due to intraperitoneal bleed. It should be noted that patient does have a hemodialysis fistula in place but just does not like to use and prefers peritoneal dialysis. She was sent to [facility] emergency room via EMS from dialysis center after they try to do peritoneal dialysis this morning and ~1600 cc of dark lilly red blood poured out.      Past Medical History:   Diagnosis Date    A-V fistula ([facility] [address]     LEFT SIDE     Anxiety and depression     Asthma     Chronic kidney disease     ESRD- fresinius    Chronic pain     GERD (gastroesophageal reflux disease)     History of blood transfusion 2017, 2020    Hypertension     Lupus ([address]     Psychiatric disorder     Thyroid disease     hypo       Past Surgical History:   Procedure Laterality Date    HX HEENT  2021    oral surgery    HX UROLOGICAL  34622, 2019    biopsies of kidneys    HX VASCULAR ACCESS Left 2020    dialysis shunt (arm)    IR INSERT INTRAPERITONEAL CATH PERM      ME ABDOMEN SURGERY PROC UNLISTED  2015    Drain a boil       Family History   Problem Relation Age of Onset    Kidney Disease Father     Lupus Father        Social History     Socioeconomic History    Marital status: SINGLE   Tobacco Use    Smoking status: Current Every Day Smoker    Smokeless tobacco: Never Used    Tobacco comment: 5 Black and milds daily d/t recent stress    Vaping Use    Vaping Use: Never used   Substance and Sexual Activity    Alcohol use: Not Currently     Comment: ocassionally    Drug use: Yes     Types: Marijuana     Comment: Instructed not to smoke 24 hours prior to [address]       Prior to Admission medications    Medication Sig Start Date End Date Taking? Authorizing Provider   oxyCODONE-acetaminophen (PERCOCET) 5-325 mg per tablet Take 1 Tablet by mouth every four (4) hours as needed for Pain for up to 3 days. Max Daily Amount: 6 Tablets. Indications: pain 4/15/22 4/18/22  Theodora Trent MD   ondansetron (ZOFRAN ODT) 8 mg disintegrating tablet Take 1 Tablet by mouth every eight (8) hours as needed for Nausea or Vomiting. Indications: prevent nausea and vomiting after surgery 4/15/22   Theodora Trent MD   calcitRIOL (ROCALTROL) 0.5 mcg capsule Take 3 Capsules by mouth daily for 30 days. Patient taking differently: Take 1.5 mcg by mouth two (2) times a day.  3/24/22 4/23/22  Sebastian Monroe MD   amLODIPine (NORVASC) 10 mg tablet Take 1 Tablet by mouth daily (after lunch). Indications: high blood pressure 3/24/22   Eric Marin MD   bumetanide (BUMEX) 2 mg tablet Take 1 Tablet by mouth daily. 3/24/22   Eric Marin MD   sevelamer carbonate (RENVELA) 800 mg tab tab Take 1 Tablet by mouth three (3) times daily (with meals) for 30 days. 3/24/22 4/23/22  Eric Marin MD   sodium bicarbonate 650 mg tablet Take 1 Tablet by mouth two (2) times a day for 30 days. 3/24/22 4/23/22  Eric Marin MD   calcium carbonate (OS-VIRGILIO) 500 mg calcium (1,250 mg) tablet Take 3 Tablets by mouth Before breakfast, lunch, dinner and at bedtime. 2/22/22   Provider, Historical   ondansetron (ZOFRAN ODT) 8 mg disintegrating tablet Take 1 Tablet by mouth every eight (8) hours as needed for Nausea or Vomiting. Indications: prevent nausea and vomiting after surgery 6/3/21   Darlene Reilly MD   albuterol (PROVENTIL HFA, VENTOLIN HFA, PROAIR HFA) 90 mcg/actuation inhaler Take  by inhalation as needed for Wheezing. Provider, Historical   budesonide (PULMICORT) 0.25 mg/2 mL nbsp by Nebulization route daily (after lunch). Provider, Historical   cetirizine (ZYRTEC) 10 mg tablet Take  by mouth daily (after lunch). Provider, Historical   divalproex ER (Depakote ER) 500 mg ER tablet Take 500 mg by mouth two (2) times a day. Indications: bipolar disorder in remission    Provider, Historical   escitalopram oxalate (Lexapro) 10 mg tablet Take 10 mg by mouth daily. Provider, Historical   famotidine (PEPCID) 20 mg tablet Take 20 mg by mouth nightly. Provider, Historical   ferrous sulfate 324 mg (65 mg iron) tablet Take  by mouth two (2) times daily (with meals). Provider, Historical   fluticasone propion-salmeteroL (Advair Diskus) 250-50 mcg/dose diskus inhaler Take 1 Puff by inhalation every twelve (12) hours. Provider, Historical   fluticasone propionate (FLONASE NA) by Nasal route as needed. Provider, Historical   hydrOXYchloroQUINE (Plaquenil) 200 mg tablet Take 200 mg by mouth nightly.  Indications: systemic lupus erythematosus, an autoimmune disease, Lupus    Provider, Historical   labetaloL (NORMODYNE) 200 mg tablet Take 200 mg by mouth three (3) times daily. Indications: high blood pressure    Provider, Historical   levothyroxine (SYNTHROID) 100 mcg tablet Take 100 mcg by mouth Daily (before breakfast). Provider, Historical   pantoprazole (PROTONIX) 40 mg tablet Take 40 mg by mouth two (2) times a day. Provider, Historical       Allergies   Allergen Reactions    Banana Itching and Swelling    Clindamycin Diarrhea    Sulfa (Sulfonamide Antibiotics) Hives     Bactrim       Review of Systems  Gen: No fever, chills, malaise, weight loss/gain. Heent: No headache, rhinorrhea, epistaxis, ear pain, hearing loss, sinus pain, neck pain/stiffness, sore throat. Heart: No chest pain, palpitations, HAMILTON, pnd, or orthopnea. Resp: No cough, hemoptysis, wheezing and shortness of breath. GI: No nausea, vomiting, diarrhea, constipation, melena or hematochezia. + Abdominal pain  : No urinary obstruction, dysuria or hematuria. Derm: No rash, new skin lesion or pruritis. Musc/skeletal: no bone or joint complains. Vasc: No edema, cyanosis or claudication. Endo: No heat/cold intolerance, no polyuria,polydipsia or polyphagia. Neuro: No unilateral weakness, numbness, tingling. No seizures. Heme: No easy bruising or bleeding.       Physical Exam:     Physical Exam:  Visit Vitals  BP (!) 152/81   Pulse 86   Temp 97.9 °F (36.6 °C)   Resp 14   Wt 78.9 kg (174 lb)   LMP 2022 (Approximate)   SpO2 100%   BMI 28.08 kg/m²      O2 Device: None (Room air)    Temp (24hrs), Av.2 °F (36.8 °C), Min:97.7 °F (36.5 °C), Max:98.7 °F (37.1 °C)    1901 -  0700  In: 333.3   Out: -    701 - 1900  In: 330   Out: -     General:  Awake, cooperative, no distress, ill-appearing -American female very sleepy and wants to sleep but not lethargic, wakes up easily to answer questions   Head:  Normocephalic, without obvious abnormality, atraumatic. Eyes:  Conjunctivae/corneas clear, sclera anicteric, PERRL, EOMs intact. Nose: Nares normal. No drainage or sinus tenderness. Throat: Lips, mucosa, and tongue normal.    Neck: Supple, symmetrical, trachea midline, no adenopathy. Lungs:   Clear to auscultation bilaterally. Heart:  Regular rate and rhythm, S1, S2 normal, no murmur, click, rub or gallop. Abdomen: Soft, non-tender. Bowel sounds normal. No masses,  No organomegaly. Extremities: Extremities normal, atraumatic, no cyanosis or edema. Capillary refill normal.   Pulses: 2+ and symmetric all extremities. Skin: Skin color as per ethnicity, turgor normal. No rashes or lesions   Neurologic: CNII-XII intact. No focal motor or sensory deficit.      Labs Reviewed:    Recent Results (from the past 24 hour(s))   URINALYSIS W/ RFLX MICROSCOPIC    Collection Time: 04/19/22 11:52 AM   Result Value Ref Range    Color YELLOW      Appearance CLOUDY      Specific gravity 1.007 1.005 - 1.030      pH (UA) 6.5 5.0 - 8.0      Protein 100 (A) NEG mg/dL    Glucose Negative NEG mg/dL    Ketone Negative NEG mg/dL    Bilirubin Negative NEG      Blood LARGE (A) NEG      Urobilinogen 0.2 0.2 - 1.0 EU/dL    Nitrites Negative NEG      Leukocyte Esterase MODERATE (A) NEG     URINE MICROSCOPIC ONLY    Collection Time: 04/19/22 11:52 AM   Result Value Ref Range    WBC 11 to 20 0 - 5 /hpf    RBC 11 to 20 0 - 5 /hpf    Epithelial cells 3+ 0 - 5 /lpf    Bacteria 2+ (A) NEG /hpf   CBC WITH AUTOMATED DIFF    Collection Time: 04/19/22 12:25 PM   Result Value Ref Range    WBC 8.4 4.6 - 13.2 K/uL    RBC 1.86 (L) 4.20 - 5.30 M/uL    HGB 4.9 (LL) 12.0 - 16.0 g/dL    HCT 15.5 (LL) 35.0 - 45.0 %    MCV 83.3 78.0 - 100.0 FL    MCH 26.3 24.0 - 34.0 PG    MCHC 31.6 31.0 - 37.0 g/dL    RDW 14.6 (H) 11.6 - 14.5 %    PLATELET 556 (L) 454 - 420 K/uL    NRBC 0.0 0  WBC    ABSOLUTE NRBC 0.00 0.00 - 0.01 K/uL    NEUTROPHILS 92 (H) 40 - 73 % LYMPHOCYTES 3 (L) 21 - 52 %    MONOCYTES 5 3 - 10 %    EOSINOPHILS 0 0 - 5 %    BASOPHILS 0 0 - 2 %    IMMATURE GRANULOCYTES 0 %    ABS. NEUTROPHILS 7.7 1.8 - 8.0 K/UL    ABS. LYMPHOCYTES 0.3 (L) 0.9 - 3.6 K/UL    ABS. MONOCYTES 0.4 0.05 - 1.2 K/UL    ABS. EOSINOPHILS 0.0 0.0 - 0.4 K/UL    ABS. BASOPHILS 0.0 0.0 - 0.1 K/UL    ABS. IMM.  GRANS. 0.0 K/UL    DF MANUAL      PLATELET COMMENTS DECREASED PLATELETS      RBC COMMENTS HYPOCHROMIA  1+        RBC COMMENTS POLYCHROMASIA  SLIGHT  TARGET CELLS  1+        RBC COMMENTS OVALOCYTES  3+       TYPE & SCREEN    Collection Time: 04/19/22 12:25 PM   Result Value Ref Range    Crossmatch Expiration 04/22/2022,2359     ABO/Rh(D) O POSITIVE     Antibody screen NEG     Unit number [id number]     Blood component type  LR     Unit division 00     Status of unit ISSUED     Crossmatch result Compatible     Unit number [id number]     Blood component type  LR,1     Unit division 00     Status of unit ISSUED     Crossmatch result Compatible    PROTHROMBIN TIME + INR    Collection Time: 04/19/22 12:25 PM   Result Value Ref Range    Prothrombin time 16.6 (H) 11.5 - 15.2 sec    INR 1.4 (H) 0.8 - 1.2     PTT    Collection Time: 04/19/22 12:25 PM   Result Value Ref Range    aPTT 52.4 (H) 23.0 - 36.4 SEC   LIPASE    Collection Time: 04/19/22 12:25 PM   Result Value Ref Range    Lipase 38 (L) 73 - 393 U/L   LACTIC ACID    Collection Time: 04/19/22 12:25 PM   Result Value Ref Range    Lactic acid 0.5 0.4 - 2.0 MMOL/L   METABOLIC PANEL, COMPREHENSIVE    Collection Time: 04/19/22 12:25 PM   Result Value Ref Range    Sodium 130 (L) 136 - 145 mmol/L    Potassium 5.2 3.5 - 5.5 mmol/L    Chloride 97 (L) 100 - 111 mmol/L    CO2 20 (L) 21 - 32 mmol/L    Anion gap 13 3.0 - 18 mmol/L    Glucose 85 74 - 99 mg/dL    BUN 95 (H) 7.0 - 18 MG/DL    Creatinine 20.30 (H) 0.6 - 1.3 MG/DL    BUN/Creatinine ratio 5 (L) 12 - 20      GFR est AA 3 (L) >60 ml/min/1.73m2    GFR est non-AA 2 (L) >60 ml/min/1.73m2    Calcium 7.2 (L) 8.5 - 10.1 MG/DL    Bilirubin, total 0.7 0.2 - 1.0 MG/DL    ALT (SGPT) <6 (L) 13 - 56 U/L    AST (SGOT) 11 10 - 38 U/L    Alk. phosphatase 64 45 - 117 U/L    Protein, total 5.7 (L) 6.4 - 8.2 g/dL    Albumin 2.0 (L) 3.4 - 5.0 g/dL    Globulin 3.7 2.0 - 4.0 g/dL    A-G Ratio 0.5 (L) 0.8 - 1.7     RBC, ALLOCATE    Collection Time: 04/19/22  1:00 PM   Result Value Ref Range    HISTORY CHECKED? Historical check performed    PLASMA, ALLOCATE    Collection Time: 04/19/22  4:00 PM   Result Value Ref Range    Unit number [id number]     Blood component type FP 24h, Thaw     Unit division 00     Status of unit ISSUED    URINALYSIS W/MICROSCOPIC    Collection Time: 04/19/22  5:59 PM   Result Value Ref Range    Color YELLOW      Appearance CLEAR      Specific gravity 1.006 1.005 - 1.030      pH (UA) 7.0 5.0 - 8.0      Protein 100 (A) NEG mg/dL    Glucose Negative NEG mg/dL    Ketone Negative NEG mg/dL    Bilirubin Negative NEG      Blood MODERATE (A) NEG      Urobilinogen 0.2 0.2 - 1.0 EU/dL    Nitrites Negative NEG      Leukocyte Esterase TRACE (A) NEG      WBC 0 to 3 0 - 5 /hpf    RBC 1 to 3 0 - 5 /hpf    Epithelial cells 2+ 0 - 5 /lpf    Bacteria FEW (A) NEG /hpf   CBC WITH AUTOMATED DIFF    Collection Time: 04/19/22  6:45 PM   Result Value Ref Range    WBC 5.8 4.6 - 13.2 K/uL    RBC 2.01 (L) 4.20 - 5.30 M/uL    HGB 5.5 (LL) 12.0 - 16.0 g/dL    HCT 16.8 (LL) 35.0 - 45.0 %    MCV 83.6 78.0 - 100.0 FL    MCH 27.4 24.0 - 34.0 PG    MCHC 32.7 31.0 - 37.0 g/dL    RDW 14.6 (H) 11.6 - 14.5 %    PLATELET 98 (L) 260 - 420 K/uL    NRBC 0.0 0  WBC    ABSOLUTE NRBC 0.00 0.00 - 0.01 K/uL    NEUTROPHILS 83 (H) 40 - 73 %    LYMPHOCYTES 6 (L) 21 - 52 %    MONOCYTES 8 3 - 10 %    EOSINOPHILS 2 0 - 5 %    BASOPHILS 0 0 - 2 %    IMMATURE GRANULOCYTES 1 (H) 0.0 - 0.5 %    ABS. NEUTROPHILS 4.8 1.8 - 8.0 K/UL    ABS. LYMPHOCYTES 0.3 (L) 0.9 - 3.6 K/UL    ABS. MONOCYTES 0.5 0.05 - 1.2 K/UL    ABS. EOSINOPHILS 0.1 0.0 - 0.4 K/UL    ABS. BASOPHILS 0.0 0.0 - 0.1 K/UL    ABS. IMM. GRANS. 0.1 (H) 0.00 - 0.04 K/UL    DF AUTOMATED     METABOLIC PANEL, COMPREHENSIVE    Collection Time: 04/19/22  6:45 PM   Result Value Ref Range    Sodium 132 (L) 136 - 145 mmol/L    Potassium 4.0 3.5 - 5.5 mmol/L    Chloride 101 100 - 111 mmol/L    CO2 25 21 - 32 mmol/L    Anion gap 6 3.0 - 18 mmol/L    Glucose 86 74 - 99 mg/dL    BUN 72 (H) 7.0 - 18 MG/DL    Creatinine 14.90 (H) 0.6 - 1.3 MG/DL    BUN/Creatinine ratio 5 (L) 12 - 20      GFR est AA 4 (L) >60 ml/min/1.73m2    GFR est non-AA 3 (L) >60 ml/min/1.73m2    Calcium 8.2 (L) 8.5 - 10.1 MG/DL    Bilirubin, total 0.5 0.2 - 1.0 MG/DL    ALT (SGPT) <6 (L) 13 - 56 U/L    AST (SGOT) 12 10 - 38 U/L    Alk. phosphatase 59 45 - 117 U/L    Protein, total 5.9 (L) 6.4 - 8.2 g/dL    Albumin 1.9 (L) 3.4 - 5.0 g/dL    Globulin 4.0 2.0 - 4.0 g/dL    A-G Ratio 0.5 (L) 0.8 - 1.7     RBC, ALLOCATE    Collection Time: 04/19/22  7:30 PM   Result Value Ref Range    HISTORY CHECKED? Historical check performed    PLATELETS, ALLOCATE    Collection Time: 04/19/22  7:30 PM   Result Value Ref Range    Unit number [id number]     Blood component type PLP,LR PSTC2     Unit division 00     Status of unit ALLOCATED        Procedures/imaging: see electronic medical records for all procedures/Xrays and details which were not copied into this note but were reviewed prior to creation of Plan    [id number], 70 minutes of critical care time spent in the direct evaluation and treatment of this high risk patient. The reason for providing this level of medical care for this critically ill patient was due a critical illness that impaired one or more vital organ systems such that there was a high probability of imminent or life threatening deterioration in the patients condition.  This care involved high complexity decision making to assess, manipulate, and support vital system functions, to treat this degreee vital organ system failure and to prevent further life threatening deterioration of the patients condition.       CC: Deanne Patiño MD

## 2022-04-19 NOTE — CONSULTS
RENAL CONSULT  2022    Patient:  Lisa Barragan  :  1995  Gender:  female  MRN #:  [id number]    Reason for Consult: ESRD for dialysis related care and significant anemia. Assessment:    Lisa Barragan is a 32y.o. year old female with SLE , lupus nephritis leading to ESRD now on PD,  Hypertension, Anemia of CKD , secondary Hyperparathyroidism was sent from dialysis unit due to blood in peritoneal    fluid . She had diagnostic laparoscopy last Friday and new PD catheter was placed. She has abdominal pain and hemoglobin is 4.9 mg/dl most likely due to  intraperitoneal bleed. ESRD on PD   Hypertension   Post hemorrhagic anemia on top of anemia of CKD   Secondary Hyperparathyroidism      Plan:      ESRD - she has not had dialysis since : Clinically has volume overload but breathing is still stable in room air, mild metabolic acidosis. Will plan for hemodialysis for now .  She had AVF in left arm with good thrill /bruit and mature   Informed on call dialysis nurse for hemodialysis today   As long she is in house will plan hemodialysis on TTS  Will resume PD when bleeding resolves and appropriate to use PD catheter  Intake and output charting   Dose all meds for ESRD    Anemia - she has anemia of CKD, drop in hemoglobin now most likely intraperitoneal bleed  CT scan of abdomen   Agree with 1 unit of PRBC transfusion now   Surgery consult, discussed with Dr Guy Sandifer in case she needs surgical intervention  Agree with ICU admission  Transfusion prn to keep level above 7 gram/dl       Hypertension: she takes amlodipine , labetalol and diuretics at home which should be resumed after dialysis today if BP does not improve  Will start Bumex 2 mg IV bid   Close observation due to bleeding     Secondary Hyperparathyroidism: resume calcitriol 1 mcg daily once it is safe to take oral meds   Hyperphosphatemia- she takes sevelamer 800 mg TID   Hypocalcemia- she has h/o persistent Hypocalcemia takes calcium carbonate 1500 mg TID at home   Will resume these tomorrow       History of Present Illness:    Sunny Ball is a 32y.o. year old female with SLE , lupus nephritis leading to ESRD now on PD,  Hypertension, Anemia of CKD , secondary Hyperparathyroidism was sent from dialysis unit due to blood in peritoneal fluid . She had h/o intraperitoneal hematoma last year which resolved spontaneously . She has been having intermittent abdominal pain since then which has limited her ability to PD as prescribed   She had diagnostic laparoscopy last Friday for peritoneal adhesion and new PD cathter was placed. 1000 cc PD fluid was placed in abdomen and drained approximately 1600 cc with dark rd PD fluid suggestive of intraperitoneal bleed. In ED she was found to have significant anemia with hemoglobin of   4.9 g/dl. BP is fine , she is alert and oriented. complinst of abdominal pain . No chest pain and SOB . Denied any other symptoms.        Past Medical History:   Diagnosis Date    A-V fistula (HCC)     LEFT SIDE     Anxiety and depression     Asthma     Chronic kidney disease     ESRD- [facility]    Chronic pain     GERD (gastroesophageal reflux disease)     History of blood transfusion 2017, 2020    Hypertension     Lupus ([address]     Psychiatric disorder     Thyroid disease     hypo     Past Surgical History:   Procedure Laterality Date    HX HEENT  2021    oral surgery    HX UROLOGICAL  09048, 2019    biopsies of kidneys    HX VASCULAR ACCESS Left 2020    dialysis shunt (arm)    IR INSERT INTRAPERITONEAL CATH PERM      GA ABDOMEN SURGERY PROC UNLISTED  2015    Drain a boil     Family History   Problem Relation Age of Onset    Kidney Disease Father     Lupus Father      Allergies   Allergen Reactions    Banana Itching and Swelling    Clindamycin Diarrhea    Sulfa (Sulfonamide Antibiotics) Hives     Bactrim     Current Facility-Administered Medications   Medication Dose Route Frequency Provider Last Rate Last Admin    0.9% sodium chloride infusion 250 mL  250 mL IntraVENous PRN Riana Shaikh MD         Current Outpatient Medications   Medication Sig Dispense Refill    ondansetron (ZOFRAN ODT) 8 mg disintegrating tablet Take 1 Tablet by mouth every eight (8) hours as needed for Nausea or Vomiting. Indications: prevent nausea and vomiting after surgery 12 Tablet 0    calcitRIOL (ROCALTROL) 0.5 mcg capsule Take 3 Capsules by mouth daily for 30 days. (Patient taking differently: Take 1.5 mcg by mouth two (2) times a day.) 90 Capsule 0    amLODIPine (NORVASC) 10 mg tablet Take 1 Tablet by mouth daily (after lunch). Indications: high blood pressure 30 Tablet 0    bumetanide (BUMEX) 2 mg tablet Take 1 Tablet by mouth daily. 30 Tablet 0    sevelamer carbonate (RENVELA) 800 mg tab tab Take 1 Tablet by mouth three (3) times daily (with meals) for 30 days. 90 Tablet 0    sodium bicarbonate 650 mg tablet Take 1 Tablet by mouth two (2) times a day for 30 days. 60 Tablet 0    calcium carbonate (OS-VIRGILIO) 500 mg calcium (1,250 mg) tablet Take 3 Tablets by mouth Before breakfast, lunch, dinner and at bedtime.  ondansetron (ZOFRAN ODT) 8 mg disintegrating tablet Take 1 Tablet by mouth every eight (8) hours as needed for Nausea or Vomiting. Indications: prevent nausea and vomiting after surgery 12 Tablet 0    albuterol (PROVENTIL HFA, VENTOLIN HFA, PROAIR HFA) 90 mcg/actuation inhaler Take  by inhalation as needed for Wheezing.  budesonide (PULMICORT) 0.25 mg/2 mL nbsp by Nebulization route daily (after lunch).  cetirizine (ZYRTEC) 10 mg tablet Take  by mouth daily (after lunch).  divalproex ER (Depakote ER) 500 mg ER tablet Take 500 mg by mouth two (2) times a day. Indications: bipolar disorder in remission      escitalopram oxalate (Lexapro) 10 mg tablet Take 10 mg by mouth daily.  famotidine (PEPCID) 20 mg tablet Take 20 mg by mouth nightly.       ferrous sulfate 324 mg (65 mg iron) tablet Take  by mouth two (2) times daily (with meals).  fluticasone propion-salmeteroL (Advair Diskus) 250-50 mcg/dose diskus inhaler Take 1 Puff by inhalation every twelve (12) hours.  fluticasone propionate (FLONASE NA) by Nasal route as needed.  hydrOXYchloroQUINE (Plaquenil) 200 mg tablet Take 200 mg by mouth nightly. Indications: systemic lupus erythematosus, an autoimmune disease, Lupus      labetaloL (NORMODYNE) 200 mg tablet Take 200 mg by mouth three (3) times daily. Indications: high blood pressure      levothyroxine (SYNTHROID) 100 mcg tablet Take 100 mcg by mouth Daily (before breakfast).  pantoprazole (PROTONIX) 40 mg tablet Take 40 mg by mouth two (2) times a day. Review of Symptoms:     Consitutional Symptoms: No fever, weight loss, weight gain and Has  fatigue  Eyes:- No change in vision , no itching   Ears, Nose, Mouth, Throat:  No neck pain , swelling ,hearing loss and nose bleed. Pulmonary: No cough and shortness of breath . CVS: No  Chest pain , palpitation and orthopnea  GI: No nausea, vomiting, Has  abdominal pain, and blood in stool   - No burning, frequency ,urgency  and difficulty voiding . Neurological:No dizzy ness, syncope, feels  weakness and numbness . Skin : No rash and erythema   Endocrine: No feeling of excessive cold or warmth, hot flushes  Psychiatric: Denied feeling depressed    Objective:    Visit Vitals  BP (!) 179/83   Pulse 82   Temp 98.2 °F (36.8 °C)   Resp 17   Wt 78.9 kg (174 lb)   LMP 03/28/2022 (Approximate)   SpO2 100%   BMI 28.08 kg/m²       Physical Exam:    Pt awake,  alert  HEENT: No JVD, anicteric sclera, no neck swelling  Lung: clear to auscultation, no crackles and wheeze  Heart: s1s2 regular ,no rubs or murmur  Abdomen: soft,  Mildly distended, , tenderness on around PD cath site .    Ext:  Has ankle edema    CNS- Oriented to time , place and person     Laboratory Data:    Lab Results   Component Value Date    BUN 95 (H) 04/19/2022    BUN 76 (H) 03/24/2022    BUN 88 (H) 03/23/2022     (L) 04/19/2022     03/24/2022     03/23/2022    CO2 20 (L) 04/19/2022    CO2 22 03/24/2022    CO2 19 (L) 03/23/2022     Lab Results   Component Value Date    WBC 8.4 04/19/2022    HGB 4.9 (LL) 04/19/2022    HCT 15.5 (LL) 04/19/2022     No components found for: CALCIUM, PHOSPHORUS, MAGNESIUM  No results found for: HDL  No results found for: SPECIMENTYP, TURBIDITY, UGLU    Imaging Reveiwed:    KUB x ray - reviewed            Approx 38     minutes of critical care time spent in the direct evaluation and formulation of treatment plan of this high risk patient. The reason for providing this level of medical care was due a critical illness that impaired one or more vital organ systems such that there was a high probability of imminent or life threatening deterioration in the patients condition. This care involved high complexity decision making to assess, manipulate, and support vital system functions, to treat this degreee vital organ system failure and to prevent further life threatening deterioration of the patients condition.            Daphnie Vincent MD, MD [facility]

## 2022-04-19 NOTE — CONSULTS
[facility]  Pulmonary, Critical Care, and Sleep Medicine    Name: Patt Mcclure MRN: [id number]   : 1995 Hospital: [facility] [place]    Date: 2022  Room: [facility]/[address] [place] [address] Note                                              Consult requesting physician: Hospitalist    Reason for Consult: acute bleeding PD catheter malfunction, renal failure , severe uremia , acute anemia      IMPRESSION:   · Acute bleeding  · Severe anemia  · End-stage renal disease  · Peritoneal dialysis malfunction  · Hyponatremia   · Hyperkalemia  ·   Patient Active Problem List   Diagnosis Code    Encephalopathy G93.40    Lupus ([address] M32.9    ESRD on dialysis ([address] N18.6, Z99.2    GERD (gastroesophageal reflux disease) K21.9    Hypertension I10    Thyroid disease E07.9    Psychiatric disorder F99    Anemia D64.9    Peritoneal hematoma S36.81XA    Peritoneal dialysis catheter dysfunction ([address] T85.611A    Acute bleeding R58 ·           · Code status:  Full code Code      RECOMMENDATIONS:   Respiratory: Stable , history of asthma resume in hospital no wheezing   Currently  on RA  Protecting airway. Keep SPO2 >=92%. HOB 30 degree elevation all the time. Aggressive pulmonary toileting. Aspiration precautions. Incentive spirometry. CVS: Hypertension, ESRD missed HD will resume home medication and HD today  ID: PD placed no fever but bleeding high risk abdominal sepsis zosyn   Sepsis protocol  Sepsis bundle and protocol followed. Follow serial lactic acid, frequent BMP check, fluid resuscitation. Follow cultures. Deescalate antibiotic when appropriate.   Hematology/Oncology: acute on chronic anemia retroperitoneal bleeding   Hb 4.9   PRBC stat  H/h stat every 6 hrs   INR wildly elevated give vit k and FFP, check fibrinogen   Renal: ESRD due to lupus since   Was on HD AV fistual wanted PD second PD cath  Urgent HD today due to severe uremia, fluid overload   GI/:   Endocrine: Monitor BS. SSI. Neurology: awake and alert    Pain/Sedation: prn  Skin/Wound:post op wound PD   Electrolytes: Replace electrolytes per ICU electrolyte replacement protocol. Per renal   IVF: PRBC avoid IVF  Nutrition: npo until surgery cleared   Prophylaxis: DVT Prophylaxis: SCD/. GI Prophylaxis: Protonix/yes. Restraints: none    Lines/Tubes: PIV  Other:  Gyn    OTHER: Moderate intraperitoneal free fluid and small amount of intraperitoneal  free air, likely related to peritoneal dialysis, with PD cannula in place coiled  in the right pelvis.      BONES: No acute or aggressive osseous abnormalities identified. _______________     IMPRESSION     1. Cystic/hemorrhagic lesion in the left adnexa, potentially hemorrhagic ovarian  cyst. Please note that pelvic/transvaginal ultrasound is most sensitive to  evaluate the gynecologic structures, including evaluation for ovarian cyst or  torsion if clinically suspected.     2. Moderate amount of intraperitoneal free fluid and small amount of  intraperitoneal free air, which can simply be related to known peritoneal  dialysis. Will defer respective systems problem management to primary and other respective consultant and follow patient in ICU with primary and other medical team.  Further recommendations will be based on the patient's response to recommended treatment and results of the investigation ordered. Quality Care: PPI, DVT prophylaxis, HOB elevated, Infection control all reviewed and addressed. Care of plan d/w hospitalist team, RN, RT, MDR.  D/w patient and hospitalist (answered all questions to satisfaction). High complexity decision making was performed during the evaluation of this patient at high risk for decompensation with multiple organ involvement. Total critical care time spent rendering care exclusive of procedures/family discussion/coordination of care: 59 minutes. Subjective/History of Present Illness:     Patient is a 32 y.o. female with PMHx significant for SLE, asthma,  end-stage renal disease on peritoneal dialysis, still has fully functional left arm fistula, since last year on the PD, new peritoneal catheter placed last Friday, abdominal pain, weakness since then the procedure. Found to have retroperitoneal bleed hematoma. Patient also has not had dialysis since 4/14 significant uremia. Critical hemoglobin on admission. CT of abdomen and pelvis personally reviewed. Nephrology and general surgery on board. Transfusion to keep hemoglobin above 7. Surgery possibly today or tomorrow as per surgical team.  Critical hb 4.9  additionally left ovarian abnormalities for farther investigation of possible bleeding/tortion. 4/19/2022:  Evaluated in ICU bed 6. Awake and alert. Complaining of abdominal pain. Whole body anasarca. Severe uremia pending urgent HD. Receiving transfusion for critical hemoglobin 4.9  Left ovarian cyst or tortion or hemorrhage I spoke to hospitalist recommend ob/gyn consultation ultrasound already pending   Asthma on pulmicort at home resume    I/O last 24 hrs: No intake or output data in the 24 hours ending 04/19/22 7515        History taken from patient and EMR    Review of Systems:  A comprehensive review of systems was negative except for that written in the HPI.        Review of Systems:   HEENT: No epistaxis, no nasal drainage, no difficulty in swallowing, no redness in eyes  Respiratory: as above  Cardiovascular: no chest pain, no palpitations, no chronic leg edema, no syncope  Gastrointestinal: yes  abd pain, no vomiting, no diarrhea, no bleeding symptoms  Genitourinary: No urinary symptoms or hematuria  Musculoskeletal: Neg  Neurological: No focal weakness, no seizures, no headaches  Behvioral/Psych: No anxiety, no depression  General : No fever, no chills, no weight loss, no night sweats     Allergies   Allergen Reactions    Banana Itching and Swelling    Clindamycin Diarrhea    Sulfa (Sulfonamide Antibiotics) Hives     Bactrim      Past Medical History:   Diagnosis Date    A-V fistula (HCC)     LEFT SIDE     Anxiety and depression     Asthma     Chronic kidney disease     ESRD- fresinius    Chronic pain     GERD (gastroesophageal reflux disease)     History of blood transfusion 2017, 2020    Hypertension     Lupus ([address]     Psychiatric disorder     Thyroid disease     hypo      Past Surgical History:   Procedure Laterality Date    HX HEENT  2021    oral surgery    HX UROLOGICAL  08384, 2019    biopsies of kidneys    HX VASCULAR ACCESS Left 2020    dialysis shunt (arm)    IR INSERT INTRAPERITONEAL CATH PERM      CA ABDOMEN SURGERY PROC UNLISTED  2015    Drain a boil      Social History     Tobacco Use    Smoking status: Current Every Day Smoker    Smokeless tobacco: Never Used    Tobacco comment: 5 Black and milds daily d/t recent stress    Substance Use Topics    Alcohol use: Not Currently     Comment: ocassionally      Family History   Problem Relation Age of Onset    Kidney Disease Father     Lupus Father       Prior to Admission medications    Medication Sig Start Date End Date Taking? Authorizing Provider   oxyCODONE-acetaminophen (PERCOCET) 5-325 mg per tablet Take 1 Tablet by mouth every four (4) hours as needed for Pain for up to 3 days. Max Daily Amount: 6 Tablets. Indications: pain 4/15/22 4/18/22  Alina Cordero MD   ondansetron (ZOFRAN ODT) 8 mg disintegrating tablet Take 1 Tablet by mouth every eight (8) hours as needed for Nausea or Vomiting. Indications: prevent nausea and vomiting after surgery 4/15/22   Alina Cordero MD   calcitRIOL (ROCALTROL) 0.5 mcg capsule Take 3 Capsules by mouth daily for 30 days. Patient taking differently: Take 1.5 mcg by mouth two (2) times a day. 3/24/22 4/23/22  Hai Allen MD   amLODIPine (NORVASC) 10 mg tablet Take 1 Tablet by mouth daily (after lunch).  Indications: high blood pressure 3/24/22   Hai Allen MD   bumetanide Valaria Sensing) 2 mg tablet Take 1 Tablet by mouth daily. 3/24/22   Eric Marin MD   sevelamer carbonate (RENVELA) 800 mg tab tab Take 1 Tablet by mouth three (3) times daily (with meals) for 30 days. 3/24/22 4/23/22  Eric Marin MD   sodium bicarbonate 650 mg tablet Take 1 Tablet by mouth two (2) times a day for 30 days. 3/24/22 4/23/22  Eric Marin MD   calcium carbonate (OS-VIRGILIO) 500 mg calcium (1,250 mg) tablet Take 3 Tablets by mouth Before breakfast, lunch, dinner and at bedtime. 2/22/22   Provider, Historical   ondansetron (ZOFRAN ODT) 8 mg disintegrating tablet Take 1 Tablet by mouth every eight (8) hours as needed for Nausea or Vomiting. Indications: prevent nausea and vomiting after surgery 6/3/21   Darlene Reilly MD   albuterol (PROVENTIL HFA, VENTOLIN HFA, PROAIR HFA) 90 mcg/actuation inhaler Take  by inhalation as needed for Wheezing. Provider, Historical   budesonide (PULMICORT) 0.25 mg/2 mL nbsp by Nebulization route daily (after lunch). Provider, Historical   cetirizine (ZYRTEC) 10 mg tablet Take  by mouth daily (after lunch). Provider, Historical   divalproex ER (Depakote ER) 500 mg ER tablet Take 500 mg by mouth two (2) times a day. Indications: bipolar disorder in remission    Provider, Historical   escitalopram oxalate (Lexapro) 10 mg tablet Take 10 mg by mouth daily. Provider, Historical   famotidine (PEPCID) 20 mg tablet Take 20 mg by mouth nightly. Provider, Historical   ferrous sulfate 324 mg (65 mg iron) tablet Take  by mouth two (2) times daily (with meals). Provider, Historical   fluticasone propion-salmeteroL (Advair Diskus) 250-50 mcg/dose diskus inhaler Take 1 Puff by inhalation every twelve (12) hours. Provider, Historical   fluticasone propionate (FLONASE NA) by Nasal route as needed. Provider, Historical   hydrOXYchloroQUINE (Plaquenil) 200 mg tablet Take 200 mg by mouth nightly.  Indications: systemic lupus erythematosus, an autoimmune disease, Lupus    Provider, Historical   labetaloL (NORMODYNE) 200 mg tablet Take 200 mg by mouth three (3) times daily. Indications: high blood pressure    Provider, Historical   levothyroxine (SYNTHROID) 100 mcg tablet Take 100 mcg by mouth Daily (before breakfast). Provider, Historical   pantoprazole (PROTONIX) 40 mg tablet Take 40 mg by mouth two (2) times a day. Provider, Historical     Current Facility-Administered Medications   Medication Dose Route Frequency    bumetanide (BUMEX) injection 2 mg  2 mg IntraVENous Q12H         Objective:   Vital Signs:    Visit Vitals  /77   Pulse 85   Temp 98.3 °F (36.8 °C)   Resp 16   Wt 78.9 kg (174 lb)   LMP 2022 (Approximate)   SpO2 100%   BMI 28.08 kg/m²               Temp (24hrs), Av.3 °F (36.8 °C), Min:98.2 °F (36.8 °C), Max:98.4 °F (36.9 °C)       Intake/Output:   Last shift:      No intake/output data recorded. Last 3 shifts: No intake/output data recorded. No intake or output data in the 24 hours ending 22 1539    Last 3 Recorded Weights in this Encounter    22 1147   Weight: 78.9 kg (174 lb)             No results for input(s): PHI, PHI, POC2, PCO2I, PO2, PO2I, HCO3, HCO3I, FIO2, FIO2I in the last 72 hours. Physical Exam:     Impresson general : Alert, Awake, in moderate distress abdominal dyscontrol   Head:   Normocephalic,atraumatic. ENT:   EOM  no scleral icterus, no pallor, no cyanosis. Nose:   No sinus tenderness  Throat:  Oropharynx ,mucosa, and tongue normal. No oral thrush. Neck:   Supple, symmetric. Lymph nodes. Trachea is midline  Lung: Moderate air entry bilateral equal No rales. No rhonchi. No wheezing. No stridors. No prolongded expiration. No accessory muscle use. Heart:   Regular  rate & rhythm. S1 S2 present. No murmur. No JVD. Abdomen:  Distended /PD cath right upper quadrant with small drainage  , ascites, decresed BS. No masses. liver and spleen  Extremities:  2 plus pedal edema. No cyanosis. No clubbing. Pulses: 2+ and symmetric in DP. Capillary refill: normal  Lymphatic:  neck and supraclavicular    Musculoskeletal: No joint swelling. No tenderness. Skin:   Lesion Color, texture, turgor normal. No rashes or lesions. Data:       Recent Results (from the past 24 hour(s))   URINALYSIS W/ RFLX MICROSCOPIC    Collection Time: 04/19/22 11:52 AM   Result Value Ref Range    Color YELLOW      Appearance CLOUDY      Specific gravity 1.007 1.005 - 1.030      pH (UA) 6.5 5.0 - 8.0      Protein 100 (A) NEG mg/dL    Glucose Negative NEG mg/dL    Ketone Negative NEG mg/dL    Bilirubin Negative NEG      Blood LARGE (A) NEG      Urobilinogen 0.2 0.2 - 1.0 EU/dL    Nitrites Negative NEG      Leukocyte Esterase MODERATE (A) NEG     URINE MICROSCOPIC ONLY    Collection Time: 04/19/22 11:52 AM   Result Value Ref Range    WBC 11 to 20 0 - 5 /hpf    RBC 11 to 20 0 - 5 /hpf    Epithelial cells 3+ 0 - 5 /lpf    Bacteria 2+ (A) NEG /hpf   CBC WITH AUTOMATED DIFF    Collection Time: 04/19/22 12:25 PM   Result Value Ref Range    WBC 8.4 4.6 - 13.2 K/uL    RBC 1.86 (L) 4.20 - 5.30 M/uL    HGB 4.9 (LL) 12.0 - 16.0 g/dL    HCT 15.5 (LL) 35.0 - 45.0 %    MCV 83.3 78.0 - 100.0 FL    MCH 26.3 24.0 - 34.0 PG    MCHC 31.6 31.0 - 37.0 g/dL    RDW 14.6 (H) 11.6 - 14.5 %    PLATELET 710 (L) 046 - 420 K/uL    NRBC 0.0 0  WBC    ABSOLUTE NRBC 0.00 0.00 - 0.01 K/uL    NEUTROPHILS 92 (H) 40 - 73 %    LYMPHOCYTES 3 (L) 21 - 52 %    MONOCYTES 5 3 - 10 %    EOSINOPHILS 0 0 - 5 %    BASOPHILS 0 0 - 2 %    IMMATURE GRANULOCYTES 0 %    ABS. NEUTROPHILS 7.7 1.8 - 8.0 K/UL    ABS. LYMPHOCYTES 0.3 (L) 0.9 - 3.6 K/UL    ABS. MONOCYTES 0.4 0.05 - 1.2 K/UL    ABS. EOSINOPHILS 0.0 0.0 - 0.4 K/UL    ABS. BASOPHILS 0.0 0.0 - 0.1 K/UL    ABS. IMM.  GRANS. 0.0 K/UL    DF MANUAL      PLATELET COMMENTS DECREASED PLATELETS      RBC COMMENTS HYPOCHROMIA  1+        RBC COMMENTS POLYCHROMASIA  SLIGHT  TARGET CELLS  1+        RBC COMMENTS OVALOCYTES  3+       TYPE & SCREEN    Collection Time: 04/19/22 12:25 PM   Result Value Ref Range    Crossmatch Expiration 04/22/2022,2359     ABO/Rh(D) O POSITIVE     Antibody screen NEG     Unit number [id number]     Blood component type RC LR     Unit division 00     Status of unit ISSUED     Crossmatch result Compatible    PROTHROMBIN TIME + INR    Collection Time: 04/19/22 12:25 PM   Result Value Ref Range    Prothrombin time 16.6 (H) 11.5 - 15.2 sec    INR 1.4 (H) 0.8 - 1.2     PTT    Collection Time: 04/19/22 12:25 PM   Result Value Ref Range    aPTT 52.4 (H) 23.0 - 36.4 SEC   LIPASE    Collection Time: 04/19/22 12:25 PM   Result Value Ref Range    Lipase 38 (L) 73 - 393 U/L   LACTIC ACID    Collection Time: 04/19/22 12:25 PM   Result Value Ref Range    Lactic acid 0.5 0.4 - 2.0 MMOL/L   METABOLIC PANEL, COMPREHENSIVE    Collection Time: 04/19/22 12:25 PM   Result Value Ref Range    Sodium 130 (L) 136 - 145 mmol/L    Potassium 5.2 3.5 - 5.5 mmol/L    Chloride 97 (L) 100 - 111 mmol/L    CO2 20 (L) 21 - 32 mmol/L    Anion gap 13 3.0 - 18 mmol/L    Glucose 85 74 - 99 mg/dL    BUN 95 (H) 7.0 - 18 MG/DL    Creatinine 20.30 (H) 0.6 - 1.3 MG/DL    BUN/Creatinine ratio 5 (L) 12 - 20      GFR est AA 3 (L) >60 ml/min/1.73m2    GFR est non-AA 2 (L) >60 ml/min/1.73m2    Calcium 7.2 (L) 8.5 - 10.1 MG/DL    Bilirubin, total 0.7 0.2 - 1.0 MG/DL    ALT (SGPT) <6 (L) 13 - 56 U/L    AST (SGOT) 11 10 - 38 U/L    Alk. phosphatase 64 45 - 117 U/L    Protein, total 5.7 (L) 6.4 - 8.2 g/dL    Albumin 2.0 (L) 3.4 - 5.0 g/dL    Globulin 3.7 2.0 - 4.0 g/dL    A-G Ratio 0.5 (L) 0.8 - 1.7     RBC, ALLOCATE    Collection Time: 04/19/22  1:00 PM   Result Value Ref Range    HISTORY CHECKED?  Historical check performed          Chemistry Recent Labs     04/19/22  1225   GLU 85   *   K 5.2   CL 97*   CO2 20*   BUN 95*   CREA 20.30*   CA 7.2*   AGAP 13   BUCR 5*   AP 64   TP 5.7*   ALB 2.0*   GLOB 3.7   AGRAT 0.5*        Lactic Acid Lactic acid   Date Value Ref Range Status   04/19/2022 0.5 0.4 - 2.0 MMOL/L Final     Recent Labs     04/19/22  1225   LAC 0.5        Liver Enzymes Protein, total   Date Value Ref Range Status   04/19/2022 5.7 (L) 6.4 - 8.2 g/dL Final     Albumin   Date Value Ref Range Status   04/19/2022 2.0 (L) 3.4 - 5.0 g/dL Final     Globulin   Date Value Ref Range Status   04/19/2022 3.7 2.0 - 4.0 g/dL Final     A-G Ratio   Date Value Ref Range Status   04/19/2022 0.5 (L) 0.8 - 1.7   Final     Alk. phosphatase   Date Value Ref Range Status   04/19/2022 64 45 - 117 U/L Final     Recent Labs     04/19/22  1225   TP 5.7*   ALB 2.0*   GLOB 3.7   AGRAT 0.5*   AP 64        CBC w/Diff Recent Labs     04/19/22  1225   WBC 8.4   RBC 1.86*   HGB 4.9*   HCT 15.5*   *   GRANS 92*   LYMPH 3*   EOS 0        Cardiac Enzymes No results found for: CPK, CK, CKMMB, CKMB, RCK3, CKMBT, CKNDX, CKND1, CALEB, TROPT, TROIQ, MARTHA, TROPT, TNIPOC, BNP, BNPP     BNP No results found for: BNP, BNPP, XBNPT     Coagulation Recent Labs     04/19/22  1225   PTP 16.6*   INR 1.4*   APTT 52.4*         Thyroid  No results found for: T4, T3U, TSH, TSHEXT    No results found for: T4     Urinalysis Lab Results   Component Value Date/Time    Color YELLOW 04/19/2022 11:52 AM    Appearance CLOUDY 04/19/2022 11:52 AM    Specific gravity 1.007 04/19/2022 11:52 AM    pH (UA) 6.5 04/19/2022 11:52 AM    Protein 100 (A) 04/19/2022 11:52 AM    Glucose Negative 04/19/2022 11:52 AM    Ketone Negative 04/19/2022 11:52 AM    Bilirubin Negative 04/19/2022 11:52 AM    Urobilinogen 0.2 04/19/2022 11:52 AM    Nitrites Negative 04/19/2022 11:52 AM    Leukocyte Esterase MODERATE (A) 04/19/2022 11:52 AM    Epithelial cells 3+ 04/19/2022 11:52 AM    Bacteria 2+ (A) 04/19/2022 11:52 AM    WBC 11 to 20 04/19/2022 11:52 AM    RBC 11 to 20 04/19/2022 11:52 AM        Micro  No results for input(s): SDES, CULT in the last 72 hours. No results for input(s): CULT in the last 72 hours. Culture data during this hospitalization.    All Micro Results     Procedure Component Value Units Date/Time    CULTURE, BLOOD [[id number]] Collected: 04/19/22 1451    Order Status: Completed Specimen: Blood Updated: 04/19/22 1515    CULTURE, BLOOD [[id number]] Collected: 04/19/22 1225    Order Status: Completed Specimen: Blood Updated: 04/19/22 1242               PFT       Ultrasound       LE Doppler       ECHO       Images report reviewed by me:  CT (Most Recent) (CT chest reviewed by me) Results from [place] encounter on 04/19/22    CT ABD PELV WO CONT    Narrative  EXAM: CT of the abdomen and pelvis. HISTORY:  -Provided with order: abdominal pain, blood return from peritoneal dialysis port  -Additional: None    COMPARISON: 03/22/22    TECHNIQUE: Axial imaging was obtained through the abdomen and pelvis without  oral or intravenous contrast.  Please note that lack of oral contrast limits the  sensitivity for detection of bowel abnormalities. Lack of IV contrast limits  solid organ assessment and enhancement characteristics. Multiplanar reformats were generated. One or more dose reduction techniques were used on this CT: automated exposure  control, adjustment of the mAs and/or kVp according to patient size, and  iterative reconstruction techniques. The specific techniques used on this CT  exam have been documented in the patient's electronic medical record. Digital Imaging and Communications in Medicine (DICOM) format image data are  available to nonaffiliated external healthcare facilities or entities on a  secure, media free, reciprocally searchable basis with patient authorization for  at least a 12-month period after this study. FINDINGS:    LOWER CHEST: Unremarkable. LIVER: Unremarkable. BILIARY: Gallbladder: No calcified gallstones or surrounding inflammatory  changes identified. No biliary ductal dilation. SPLEEN: Unremarkable. PANCREAS: Unremarkable. ADRENALS: Unremarkable. KIDNEYS: Unremarkable.     LYMPH NODES: No enlarged lymph nodes. VASCULATURE: limited in assessment without IV contrast.  Atherosclerotic aorta  without aneurysm. Unremarkable. GASTROINTESTINAL TRACT:  Esophagus/stomach/duodenum: unremarkable. Appendix: .  Small/Large bowel: No bowel dilation or wall thickening. PELVIC ORGANS:  Bladder: Unremarkable. Estimated 5.4 x 5.5 x 5.1 cm spherical structure with  layering hyperdensity (44 HU) in the left adnexa. Otherwise unremarkable    OTHER: Moderate intraperitoneal free fluid and small amount of intraperitoneal  free air, likely related to peritoneal dialysis, with PD cannula in place coiled  in the right pelvis. BONES: No acute or aggressive osseous abnormalities identified. _______________    Impression  1. Cystic/hemorrhagic lesion in the left adnexa, potentially hemorrhagic ovarian  cyst. Please note that pelvic/transvaginal ultrasound is most sensitive to  evaluate the gynecologic structures, including evaluation for ovarian cyst or  torsion if clinically suspected. 2. Moderate amount of intraperitoneal free fluid and small amount of  intraperitoneal free air, which can simply be related to known peritoneal  dialysis. CXR reviewed by me:  XR (Most Recent). CXR  reviewed by me and compared with previous CXR Results from Hospital Encounter encounter on 04/19/22    XR ABD (KUB)    Narrative  HISTORY:  -From Provider:peritoneal dialysis shunt problem  -Additional: None    Technique: Single portable frontal abdominal radiograph. Comparison study: None. Findings: Catheter tubing overlies the right abdomen, tip coiled in the right  lower quadrant, intact in visualized extent. .    Lung bases: Not included in field-of-view imaging. Bowel gas pattern: unremarkable in visualized extent. Relative paucity of bowel  in the pelvis and hazy increased density over the abdomen/pelvis may reflect  peritoneal dialysate. Other/osseous: Unremarkable. Impression  1.   Right-sided catheter, presumed corresponding to known peritoneal dialysis  catheter. Wendy Carmichael MD  4/19/2022

## 2022-04-19 NOTE — ED NOTES
TRANSFER - OUT REPORT:    Verbal report given to Garima RN(name) on Kye Kamara  being transferred to ICU (unit) for routine progression of care       Report consisted of patients Situation, Background, Assessment and   Recommendations(SBAR). Information from the following report(s) SBAR, ED Summary and MAR was reviewed with the receiving nurse. Lines:   Peripheral IV 74/03/73 Right Basilic (Active)        Opportunity for questions and clarification was provided.       Patient transported with:   Monitor  Registered Nurse

## 2022-04-19 NOTE — ED PROVIDER NOTES
EMERGENCY DEPARTMENT HISTORY AND PHYSICAL EXAM    Date: 4/19/2022  Patient Name: Jaycob Thibodeaux    History of Presenting Illness     Chief Complaint   Patient presents with    Abdominal Pain    Vascular Access Problem       History Provided By: Patient and EMS     History Ladi Stout):   11:44 AM  Jaycob Thibodeaux is a 32 y.o. female with PMHX of CKD (peritoneal dialysis) who presents to the emergency department by EMS C/O blood in peritoneal dialysis port onset today. Associated sxs include mild abdominal pain, constipation. Pt denies fever, chills or any other sxs or complaints. Patient was sent in by EMS from dialysis. She normally gets peritoneal dialysis. She had her port changed at [facility] on Friday (5 days ago). Chief Complaint: Blood in peritoneal dialysis port  Onset: Today  Timing: Acute  Context: Symptoms started spontaneously, symptoms have rapidly worsened since onset  Location: Peritoneal dialysis port  Quality: Painless  Severity: Moderate  Modifying Factors: Nothing makes it better, or worse.   Associated Symptoms: Abdominal pain, constipation    PCP: Brittany Solorio MD     Current Facility-Administered Medications   Medication Dose Route Frequency Provider Last Rate Last Admin    0.9% sodium chloride infusion 250 mL  250 mL IntraVENous PRN Isa Shaikh MD        bumetanide (BUMEX) injection 2 mg  2 mg IntraVENous Q12H Shankar Flores MD   2 mg at 04/19/22 1453    piperacillin-tazobactam (ZOSYN) 3.375 g in 0.9% sodium chloride (MBP/ADV) 100 mL MBP  3.375 g IntraVENous Q12H Judi Joe MD 25 mL/hr at 04/19/22 1645 3.375 g at 04/19/22 1645    0.9% sodium chloride infusion 250 mL  250 mL IntraVENous PRN Judi Joe MD        sodium chloride (NS) flush 5-40 mL  5-40 mL IntraVENous Q8H Teule-Hekima, Murry Hammans, MD   10 mL at 04/19/22 1646    sodium chloride (NS) flush 5-40 mL  5-40 mL IntraVENous PRN Teule-Hekima, Murry Hammans, MD        acetaminophen (TYLENOL) tablet 650 mg  650 mg Oral Q6H PRN Shelia Paulino MD        Or    acetaminophen (TYLENOL) suppository 650 mg  650 mg Rectal Q6H PRN Shelia Paulino MD        polyethylene glycol (MIRALAX) packet 17 g  17 g Oral DAILY PRN Shelia Paulino MD        ondansetron (ZOFRAN ODT) tablet 4 mg  4 mg Oral Q8H PRN Johnny Paulino MD        Or    ondansetron (ZOFRAN) injection 4 mg  4 mg IntraVENous Q6H PRN Shelia Paulino MD        [START ON 4/20/2022] famotidine (PEPCID) tablet 20 mg  20 mg Oral DAILY Shelia Paulino MD           Past History         Past Medical History:  Past Medical History:   Diagnosis Date    A-V fistula (HCC)     LEFT SIDE     Anxiety and depression     Asthma     Chronic kidney disease     ESRD- fresinius    Chronic pain     GERD (gastroesophageal reflux disease)     History of blood transfusion 2017, 2020    Hypertension     Lupus ([address]     Psychiatric disorder     Thyroid disease     hypo       Past Surgical History:  Past Surgical History:   Procedure Laterality Date    HX HEENT  2021    oral surgery    HX UROLOGICAL  36191, 2019    biopsies of kidneys    HX VASCULAR ACCESS Left 2020    dialysis shunt (arm)    IR INSERT INTRAPERITONEAL CATH PERM      WA ABDOMEN SURGERY PROC UNLISTED  2015    Drain a boil       Family History:  Family History   Problem Relation Age of Onset    Kidney Disease Father     Lupus Father    Reviewed and non-contributory    Social History:  Social History     Tobacco Use    Smoking status: Current Every Day Smoker    Smokeless tobacco: Never Used    Tobacco comment: 5 Black and milds daily d/t recent stress    Vaping Use    Vaping Use: Never used   Substance Use Topics    Alcohol use: Not Currently     Comment: ocassionally    Drug use: Yes     Types: Marijuana     Comment: Instructed not to smoke 24 hours prior to dos       Allergies:   Allergies   Allergen Reactions    Banana Itching and Swelling    Clindamycin Diarrhea    Sulfa (Sulfonamide Antibiotics) Hives     Bactrim         Review of Systems      Review of Systems   Constitutional: Negative for chills and fever. HENT: Negative for congestion, rhinorrhea and sore throat. Eyes: Negative for pain and visual disturbance. Respiratory: Negative for cough, shortness of breath and wheezing. Cardiovascular: Negative for chest pain and palpitations. Gastrointestinal: Negative for abdominal pain, diarrhea and vomiting. Genitourinary: Negative for dysuria, flank pain, frequency and urgency. Musculoskeletal: Negative for arthralgias and myalgias. Skin: Negative for rash and wound. Neurological: Negative for speech difficulty, weakness, light-headedness and headaches. Psychiatric/Behavioral: Negative for agitation and confusion. All other systems reviewed and are negative. Physical Exam     Vitals:    04/19/22 1605 04/19/22 1705 04/19/22 1805 04/19/22 1820   BP: (!) 154/97 (!) 146/100 (!) 161/100 (!) 160/92   Pulse: 84 84 82 88   Resp: 16 14 17 17   Temp:       SpO2:  100%     Weight:           Physical Exam  Vitals and nursing note reviewed. Constitutional:       General: She is not in acute distress. Appearance: Normal appearance. She is normal weight. She is not ill-appearing. HENT:      Head: Normocephalic and atraumatic. Nose: Nose normal. No rhinorrhea. Mouth/Throat:      Mouth: Mucous membranes are moist.      Pharynx: No oropharyngeal exudate or posterior oropharyngeal erythema. Eyes:      Extraocular Movements: Extraocular movements intact. Conjunctiva/sclera: Conjunctivae normal.      Pupils: Pupils are equal, round, and reactive to light. Cardiovascular:      Rate and Rhythm: Normal rate and regular rhythm. Heart sounds: No murmur heard. No friction rub. No gallop. Pulmonary:      Effort: Pulmonary effort is normal. No respiratory distress. Breath sounds: Normal breath sounds.  No wheezing, rhonchi or rales. Abdominal:      General: Bowel sounds are normal.      Palpations: Abdomen is soft. Tenderness: There is no abdominal tenderness. There is no guarding or rebound. Musculoskeletal:         General: No swelling, tenderness or deformity. Normal range of motion. Cervical back: Normal range of motion and neck supple. No rigidity. Lymphadenopathy:      Cervical: No cervical adenopathy. Skin:     General: Skin is warm and dry. Findings: No rash. Neurological:      General: No focal deficit present. Mental Status: She is alert and oriented to person, place, and time.    Psychiatric:         Mood and Affect: Mood normal.         Behavior: Behavior normal.         Diagnostic Study Results     Labs -     Recent Results (from the past 12 hour(s))   URINALYSIS W/ RFLX MICROSCOPIC    Collection Time: 04/19/22 11:52 AM   Result Value Ref Range    Color YELLOW      Appearance CLOUDY      Specific gravity 1.007 1.005 - 1.030      pH (UA) 6.5 5.0 - 8.0      Protein 100 (A) NEG mg/dL    Glucose Negative NEG mg/dL    Ketone Negative NEG mg/dL    Bilirubin Negative NEG      Blood LARGE (A) NEG      Urobilinogen 0.2 0.2 - 1.0 EU/dL    Nitrites Negative NEG      Leukocyte Esterase MODERATE (A) NEG     URINE MICROSCOPIC ONLY    Collection Time: 04/19/22 11:52 AM   Result Value Ref Range    WBC 11 to 20 0 - 5 /hpf    RBC 11 to 20 0 - 5 /hpf    Epithelial cells 3+ 0 - 5 /lpf    Bacteria 2+ (A) NEG /hpf   CBC WITH AUTOMATED DIFF    Collection Time: 04/19/22 12:25 PM   Result Value Ref Range    WBC 8.4 4.6 - 13.2 K/uL    RBC 1.86 (L) 4.20 - 5.30 M/uL    HGB 4.9 (LL) 12.0 - 16.0 g/dL    HCT 15.5 (LL) 35.0 - 45.0 %    MCV 83.3 78.0 - 100.0 FL    MCH 26.3 24.0 - 34.0 PG    MCHC 31.6 31.0 - 37.0 g/dL    RDW 14.6 (H) 11.6 - 14.5 %    PLATELET 784 (L) 592 - 420 K/uL    NRBC 0.0 0  WBC    ABSOLUTE NRBC 0.00 0.00 - 0.01 K/uL    NEUTROPHILS 92 (H) 40 - 73 %    LYMPHOCYTES 3 (L) 21 - 52 %    MONOCYTES 5 3 - 10 %    EOSINOPHILS 0 0 - 5 %    BASOPHILS 0 0 - 2 %    IMMATURE GRANULOCYTES 0 %    ABS. NEUTROPHILS 7.7 1.8 - 8.0 K/UL    ABS. LYMPHOCYTES 0.3 (L) 0.9 - 3.6 K/UL    ABS. MONOCYTES 0.4 0.05 - 1.2 K/UL    ABS. EOSINOPHILS 0.0 0.0 - 0.4 K/UL    ABS. BASOPHILS 0.0 0.0 - 0.1 K/UL    ABS. IMM. GRANS. 0.0 K/UL    DF MANUAL      PLATELET COMMENTS DECREASED PLATELETS      RBC COMMENTS HYPOCHROMIA  1+        RBC COMMENTS POLYCHROMASIA  SLIGHT  TARGET CELLS  1+        RBC COMMENTS OVALOCYTES  3+       TYPE & SCREEN    Collection Time: 04/19/22 12:25 PM   Result Value Ref Range    Crossmatch Expiration 04/22/2022,2359     ABO/Rh(D) O POSITIVE     Antibody screen NEG     Unit number [id number]     Blood component type RC LR     Unit division 00     Status of unit ISSUED     Crossmatch result Compatible    PROTHROMBIN TIME + INR    Collection Time: 04/19/22 12:25 PM   Result Value Ref Range    Prothrombin time 16.6 (H) 11.5 - 15.2 sec    INR 1.4 (H) 0.8 - 1.2     PTT    Collection Time: 04/19/22 12:25 PM   Result Value Ref Range    aPTT 52.4 (H) 23.0 - 36.4 SEC   LIPASE    Collection Time: 04/19/22 12:25 PM   Result Value Ref Range    Lipase 38 (L) 73 - 393 U/L   LACTIC ACID    Collection Time: 04/19/22 12:25 PM   Result Value Ref Range    Lactic acid 0.5 0.4 - 2.0 MMOL/L   METABOLIC PANEL, COMPREHENSIVE    Collection Time: 04/19/22 12:25 PM   Result Value Ref Range    Sodium 130 (L) 136 - 145 mmol/L    Potassium 5.2 3.5 - 5.5 mmol/L    Chloride 97 (L) 100 - 111 mmol/L    CO2 20 (L) 21 - 32 mmol/L    Anion gap 13 3.0 - 18 mmol/L    Glucose 85 74 - 99 mg/dL    BUN 95 (H) 7.0 - 18 MG/DL    Creatinine 20.30 (H) 0.6 - 1.3 MG/DL    BUN/Creatinine ratio 5 (L) 12 - 20      GFR est AA 3 (L) >60 ml/min/1.73m2    GFR est non-AA 2 (L) >60 ml/min/1.73m2    Calcium 7.2 (L) 8.5 - 10.1 MG/DL    Bilirubin, total 0.7 0.2 - 1.0 MG/DL    ALT (SGPT) <6 (L) 13 - 56 U/L    AST (SGOT) 11 10 - 38 U/L    Alk.  phosphatase 64 45 - 117 U/L    Protein, total 5.7 (L) 6.4 - 8.2 g/dL    Albumin 2.0 (L) 3.4 - 5.0 g/dL    Globulin 3.7 2.0 - 4.0 g/dL    A-G Ratio 0.5 (L) 0.8 - 1.7     RBC, ALLOCATE    Collection Time: 04/19/22  1:00 PM   Result Value Ref Range    HISTORY CHECKED? Historical check performed    PLASMA, ALLOCATE    Collection Time: 04/19/22  4:00 PM   Result Value Ref Range    Unit number [id number]     Blood component type FP 24h, Thaw     Unit division 00     Status of unit ALLOCATED    URINALYSIS W/MICROSCOPIC    Collection Time: 04/19/22  5:59 PM   Result Value Ref Range    Color YELLOW      Appearance CLEAR      Specific gravity 1.006 1.005 - 1.030      pH (UA) 7.0 5.0 - 8.0      Protein 100 (A) NEG mg/dL    Glucose Negative NEG mg/dL    Ketone Negative NEG mg/dL    Bilirubin Negative NEG      Blood MODERATE (A) NEG      Urobilinogen 0.2 0.2 - 1.0 EU/dL    Nitrites Negative NEG      Leukocyte Esterase TRACE (A) NEG      WBC PENDING /hpf    RBC PENDING /hpf    Epithelial cells PENDING /lpf    Bacteria PENDING /hpf       Radiologic Studies -   US PELV NON OBS W DOPPLER   Final Result      1. Complex left adnexal lesion, very likely hemorrhagic. While most likely   reflecting a hemorrhagic ovarian cyst, endometrioma could conceivably have this   appearance. Recommend 6-8 week follow-up ultrasound to further evaluate and   assess for resolution. 2. Pelvic fluid. CT ABD PELV WO CONT   Final Result      1. Cystic/hemorrhagic lesion in the left adnexa, potentially hemorrhagic ovarian   cyst. Please note that pelvic/transvaginal ultrasound is most sensitive to   evaluate the gynecologic structures, including evaluation for ovarian cyst or   torsion if clinically suspected. 2. Moderate amount of intraperitoneal free fluid and small amount of   intraperitoneal free air, which can simply be related to known peritoneal   dialysis. XR ABD (KUB)   Final Result      1.   Right-sided catheter, presumed corresponding to known peritoneal dialysis   catheter. .             XR CHEST PORT   Final Result      Suspect slight asymmetry of markings in the right mid and lower lung zone   compared to the left is technical. If clinical findings suggest right-sided   infectious infiltrate, then good PA and lateral chest radiograph recommended for   further assessment. CT Results  (Last 48 hours)               04/19/22 1324  CT ABD PELV WO CONT Final result    Impression:      1. Cystic/hemorrhagic lesion in the left adnexa, potentially hemorrhagic ovarian   cyst. Please note that pelvic/transvaginal ultrasound is most sensitive to   evaluate the gynecologic structures, including evaluation for ovarian cyst or   torsion if clinically suspected. 2. Moderate amount of intraperitoneal free fluid and small amount of   intraperitoneal free air, which can simply be related to known peritoneal   dialysis. Narrative:  EXAM: CT of the abdomen and pelvis. HISTORY:    -Provided with order: abdominal pain, blood return from peritoneal dialysis port   -Additional: None       COMPARISON: 03/22/22       TECHNIQUE: Axial imaging was obtained through the abdomen and pelvis without   oral or intravenous contrast.  Please note that lack of oral contrast limits the   sensitivity for detection of bowel abnormalities. Lack of IV contrast limits   solid organ assessment and enhancement characteristics. Multiplanar reformats were generated. One or more dose reduction techniques were used on this CT: automated exposure   control, adjustment of the mAs and/or kVp according to patient size, and   iterative reconstruction techniques. The specific techniques used on this CT   exam have been documented in the patient's electronic medical record.        Digital Imaging and Communications in Medicine (DICOM) format image data are   available to nonaffiliated external healthcare facilities or entities on a   secure, media free, reciprocally searchable basis with patient authorization for   at least a 12-month period after this study. FINDINGS:       LOWER CHEST: Unremarkable. LIVER: Unremarkable. BILIARY: Gallbladder: No calcified gallstones or surrounding inflammatory   changes identified. No biliary ductal dilation. SPLEEN: Unremarkable. PANCREAS: Unremarkable. ADRENALS: Unremarkable. KIDNEYS: Unremarkable. LYMPH NODES: No enlarged lymph nodes. VASCULATURE: limited in assessment without IV contrast.  Atherosclerotic aorta   without aneurysm. Unremarkable. GASTROINTESTINAL TRACT:    Esophagus/stomach/duodenum: unremarkable. Appendix: .   Small/Large bowel: No bowel dilation or wall thickening. PELVIC ORGANS:    Bladder: Unremarkable. Estimated 5.4 x 5.5 x 5.1 cm spherical structure with   layering hyperdensity (44 HU) in the left adnexa. Otherwise unremarkable       OTHER: Moderate intraperitoneal free fluid and small amount of intraperitoneal   free air, likely related to peritoneal dialysis, with PD cannula in place coiled   in the right pelvis. BONES: No acute or aggressive osseous abnormalities identified. _______________               CXR Results  (Last 48 hours)               04/19/22 1214  XR CHEST PORT Final result    Impression:      Suspect slight asymmetry of markings in the right mid and lower lung zone   compared to the left is technical. If clinical findings suggest right-sided   infectious infiltrate, then good PA and lateral chest radiograph recommended for   further assessment. Narrative:  HISTORY:    -Provided with order: peritoneal dialysis   -Additional: None       Technique : AP PORTABLE CHEST       Comparison : 03/22/22        FINDINGS:       HEART AND MEDIASTINUM: Unremarkable. LUNGS AND PLEURAL SPACES: No consolidation, congestive heart failure, pleural   effusion or pneumothorax.  There is slight asymmetry of the parenchymal markings   in the right lung compared to left, likely technical.       BONY THORAX AND SOFT TISSUES: No acute osseous abnormality. Medications given in the ED-  Medications   0.9% sodium chloride infusion 250 mL (has no administration in time range)   bumetanide (BUMEX) injection 2 mg (2 mg IntraVENous Given 4/19/22 1453)   piperacillin-tazobactam (ZOSYN) 3.375 g in 0.9% sodium chloride (MBP/ADV) 100 mL MBP (3.375 g IntraVENous New Bag 4/19/22 1645)   0.9% sodium chloride infusion 250 mL (has no administration in time range)   sodium chloride (NS) flush 5-40 mL (10 mL IntraVENous Given 4/19/22 1646)   sodium chloride (NS) flush 5-40 mL (has no administration in time range)   acetaminophen (TYLENOL) tablet 650 mg (has no administration in time range)     Or   acetaminophen (TYLENOL) suppository 650 mg (has no administration in time range)   polyethylene glycol (MIRALAX) packet 17 g (has no administration in time range)   ondansetron (ZOFRAN ODT) tablet 4 mg (has no administration in time range)     Or   ondansetron (ZOFRAN) injection 4 mg (has no administration in time range)   famotidine (PEPCID) tablet 20 mg (has no administration in time range)   phytonadione (vitamin K1) (AQUA-MEPHYTON) 10 mg in 0.9% sodium chloride 50 mL IVPB (10 mg IntraVENous New Bag 4/19/22 1645)         Procedures     Procedures    ED Course     I am the first provider for this patient. I reviewed the vital signs, available nursing notes, past medical history, past surgical history, family history and social history. Records Reviewed: Nursing Notes    Pulse Oximetry Analysis - 100% on RA     Cardiac Monitor:  Rate: 82 bpm  Rhythm: sinus rhythm    EKG interpretation: (Preliminary)  Rhythm: NSR. Rate: 82 bpm; no STEMI  EKG read by Linnea Alamo MD at 11:41 AM    11:44 AM Initial assessment performed.  The patients presenting problems have been discussed, and they are in agreement with the care plan formulated and outlined with them. I have encouraged them to ask questions as they arise throughout their visit. ED Course as of 04/19/22 1834   Tue Apr 19, 2022   1256 Discussed Pt with Dr. Haylee Mendez, he has discussed the patient with Dr. Thang Crawford, keep NPO (after midnight) consult Dr. Thang Crawford with immediate surgical concerns. [JM]   4902 Discussed with Dr. Velia Savage, admit to hospitalist, transfuse, he will hemodialize. Discuss with Dr. Thang Crawford. [JM]   [phone] I have discussed with the patient the rationale for blood component transfusion; its benefits in treating or preventing fatigue, organ damage, or death; and its risk which includes mild transfusion reactions, rare risk of blood borne infection, or more serious but rare reactions. I have discussed the alternatives to transfusion, including the risk and consequences of not receiving transfusion. The patient had an opportunity to ask questions and had agreed to proceed with transfusion of blood components. [JM]   9713 Discussed with Dr. Thang Crawford, admit to hospitalist, NPO P midnight, he will likely operate tomorrow. [JM]   0904 Discussed with Dr. Neha Damon for ICU admission [JM]      ED Course User Index  [JM] Parminder Bustamante MD       Medical Decision Making     Provider Notes (Medical Decision Making):   DDX: Anemia, intraperitoneal hemorrhage, intraperitoneal hematoma, elevated electrolytes, need for dialysis    Discussion:  32 y.o. female on chronic peritoneal dialysis. Patient had her peritoneal dialysis catheter  replaced on Friday 4 days ago). Patient was supposed to have peritoneal dialysis this morning and  she was found to have lilly blood in her peritoneal dialysis. Patient was transferred to the ED where she was found to have a hemoglobin of 4.9. Blood transfusion was ordered in the ED and started in the ED. Discussed with nephrology who stated that they would arrange for hemodialysis of the patient as she still has a functional AV fistula in her left arm.   Also discussed with general surgery who replaced the peritoneal dialysis catheter last week. They will plan on taking the patient to the OR tomorrow. Discussed with hospitalist for ICU admission. Attempted contact multiple times with intensivist but they were unavailable due to being involved in a separate procedure. Critical Care Time:   I have spent 83 minutes of critical care time involved in lab review, consultations with specialist, family decision-making, and documentation. During this entire length of time I was immediately available to the patient. Critical Care: The reason for providing this level of medical care for this critically ill patient was due a critical illness that impaired one or more vital organ systems such that there was a high probability of imminent or life threatening deterioration in the patients condition. This care involved high complexity decision making to assess, manipulate, and support vital system functions, to treat this degreee vital organ system failure and to prevent further life threatening deterioration of the patients condition. Diagnosis and Disposition     Critical Care Time: 83 minutes    Core Measures:  For Hospitalized Patients:    1. Hospitalization Decision Time:  The decision to hospitalize the patient was made by Nola Roldan MD, MD at 12:56 PM on 4/19/2022    2. Aspirin: Aspirin was not given because the patient did not present with a stroke at the time of their Emergency Department evaluation    1:57 PM patient is being admitted to the hospital by Dr. Janell Trent. The results of their tests and reasons for their admission have been discussed with them and/or available family. They convey agreement and understanding for the need to be admitted and for their admission diagnosis. CONDITIONS ON ADMISSION:  Sepsis is not present at the time of admission. Deep Vein Thrombosis is not present at the time of admission.  Thrombosis is not present at the time of admission. Urinary Tract Infection is not present at the time of admission. Pneumonia is not present at the time of admission. MRSA is not present at the time of admission. Wound infection is not present at the time of admission. Pressure Ulcer is not present at the time of admission. CLINICAL IMPRESSION:    1. Anemia, unspecified type    2. Stage 5 chronic kidney disease on chronic dialysis ([facility] [address]    3. Peritoneal hematoma    4. Peritoneal dialysis catheter dysfunction, initial encounter [facility])        Dragon Disclaimer     Please note that this dictation was completed with ActivePath, the computer voice recognition software. Quite often unanticipated grammatical, syntax, homophones, and other interpretive errors are inadvertently transcribed by the computer software. Please disregard these errors. Please excuse any errors that have escaped final proofreading.     Sis Corrales MD

## 2022-04-19 NOTE — ED TRIAGE NOTES
Patient arrived via EMS from dialysis center. Patient had abdominal surgery for port placement on Friday. Today during dialysis, 1000 of dialysate instilled but 1600 drained of dark red blood     Patient states she had small bowel movement this AM, but unable to have proper bowel movement for the past few days and feels extremely bloated and full. Patient able to pass gas and belch. C/o abdominal pain 8/10 at this time.

## 2022-04-19 NOTE — PROGRESS NOTES
Zosyn (Piperacillin/Tazobactam) Extended Infusion    Leatha Huddleston, a 32 y.o. yo female, has been converted to an extended infusion of Zosyn while in the intensive care unit. A loading dose of 3.375 or 4.5 gm will be given over 30 minutes depending on indication. Extended infusions will begin 4 hours after the initial dose if CrCl  >/= 20 ml/min or 8 hours after the initial dose if CrCl < 20 ml/min. Extended infusions will run over 4 hours (240 minutes).     Recent Labs     04/19/22  1225   CREA 20.30*     Ht Readings from Last 1 Encounters:   04/15/22 167.6 cm (66\")     Wt Readings from Last 1 Encounters:   04/19/22 78.9 kg (174 lb)       CrCl : Serum creatinine: 20.3 mg/dL (H) 04/19/22 1225  Estimated creatinine clearance: 4.4 mL/min (A)    Renal adjustment of extended infusion of Zosyn  3.375 or 4.5 gm every 8 hours for CrCl >/= 20 ml/min  3.375 or 4.5 gm every 12 hours for CrCl < 20 ml/min, intermittent HD or PD

## 2022-04-19 NOTE — ED NOTES
This nurse remained at bedside during first 15mins of transfusion. Patient provided education on transfusion reactions to be aware of call bell placed within reach and patient placed on cardiac monitoring. No transfusion reaction suspected at this time.

## 2022-04-20 PROBLEM — K66.1 HEMOPERITONEUM: Status: ACTIVE | Noted: 2022-04-19

## 2022-04-20 PROBLEM — R93.89 ABNORMAL PELVIC ULTRASOUND: Status: ACTIVE | Noted: 2022-04-19

## 2022-04-20 LAB
ALBUMIN SERPL-MCNC: 2 G/DL (ref 3.4–5)
ALBUMIN/GLOB SERPL: 0.6 {RATIO} (ref 0.8–1.7)
ALP SERPL-CCNC: 62 U/L (ref 45–117)
ALT SERPL-CCNC: <6 U/L (ref 13–56)
ANION GAP SERPL CALC-SCNC: 7 MMOL/L (ref 3–18)
APPEARANCE FLD: ABNORMAL
AST SERPL-CCNC: 14 U/L (ref 10–38)
BASOPHILS # BLD: 0 K/UL (ref 0–0.1)
BASOPHILS NFR BLD: 0 % (ref 0–2)
BILIRUB SERPL-MCNC: 0.5 MG/DL (ref 0.2–1)
BLD PROD TYP BPU: NORMAL
BPU ID: NORMAL
BUN SERPL-MCNC: 48 MG/DL (ref 7–18)
BUN/CREAT SERPL: 4 (ref 12–20)
CALCIUM SERPL-MCNC: 8.1 MG/DL (ref 8.5–10.1)
CHLORIDE SERPL-SCNC: 105 MMOL/L (ref 100–111)
CO2 SERPL-SCNC: 26 MMOL/L (ref 21–32)
COLOR FLD: ABNORMAL
CREAT SERPL-MCNC: 12.3 MG/DL (ref 0.6–1.3)
DIFFERENTIAL METHOD BLD: ABNORMAL
EOSINOPHIL # BLD: 0.1 K/UL (ref 0–0.4)
EOSINOPHIL NFR BLD: 2 % (ref 0–5)
ERYTHROCYTE [DISTWIDTH] IN BLOOD BY AUTOMATED COUNT: 14.5 % (ref 11.6–14.5)
FIBRINOGEN PPP-MCNC: 657 MG/DL (ref 210–451)
GLOBULIN SER CALC-MCNC: 3.4 G/DL (ref 2–4)
GLUCOSE SERPL-MCNC: 80 MG/DL (ref 74–99)
HBV SURFACE AB SER QL IA: POSITIVE
HBV SURFACE AB SERPL IA-ACNC: 186.78 MIU/ML
HCT VFR BLD AUTO: 19.3 % (ref 35–45)
HCT VFR BLD AUTO: 21.2 % (ref 35–45)
HCT VFR BLD AUTO: 21.2 % (ref 35–45)
HCT VFR BLD AUTO: 22 % (ref 35–45)
HEP BS AB COMMENT,HBSAC: NORMAL
HGB BLD-MCNC: 6.4 G/DL (ref 12–16)
HGB BLD-MCNC: 7 G/DL (ref 12–16)
HGB BLD-MCNC: 7 G/DL (ref 12–16)
HGB BLD-MCNC: 7.3 G/DL (ref 12–16)
HISTORY CHECKED?,CKHIST: NORMAL
IMM GRANULOCYTES # BLD AUTO: 0.1 K/UL (ref 0–0.04)
IMM GRANULOCYTES NFR BLD AUTO: 1 % (ref 0–0.5)
INR PPP: 1.2 (ref 0.8–1.2)
LYMPHOCYTES # BLD: 0.4 K/UL (ref 0.9–3.6)
LYMPHOCYTES NFR BLD: 5 % (ref 21–52)
MCH RBC QN AUTO: 27.7 PG (ref 24–34)
MCHC RBC AUTO-ENTMCNC: 33.2 G/DL (ref 31–37)
MCV RBC AUTO: 83.5 FL (ref 78–100)
MONOCYTES # BLD: 0.6 K/UL (ref 0.05–1.2)
MONOCYTES NFR BLD: 8 % (ref 3–10)
NEUTS SEG # BLD: 6 K/UL (ref 1.8–8)
NEUTS SEG NFR BLD: 84 % (ref 40–73)
NRBC # BLD: 0 K/UL (ref 0–0.01)
NRBC BLD-RTO: 0 PER 100 WBC
NUC CELL # FLD: 2573 /CU MM
PLATELET # BLD AUTO: 126 K/UL (ref 135–420)
POTASSIUM SERPL-SCNC: 4.1 MMOL/L (ref 3.5–5.5)
PROT SERPL-MCNC: 5.4 G/DL (ref 6.4–8.2)
PROTHROMBIN TIME: 14.7 SEC (ref 11.5–15.2)
RBC # BLD AUTO: 2.31 M/UL (ref 4.2–5.3)
RBC # FLD: ABNORMAL /CU MM
SODIUM SERPL-SCNC: 138 MMOL/L (ref 136–145)
SPECIMEN SOURCE FLD: ABNORMAL
STATUS OF UNIT,%ST: NORMAL
UNIT DIVISION, %UDIV: 0
WBC # BLD AUTO: 7.1 K/UL (ref 4.6–13.2)

## 2022-04-20 PROCEDURE — 74011250637 HC RX REV CODE- 250/637: Performed by: FAMILY MEDICINE

## 2022-04-20 PROCEDURE — 74011000258 HC RX REV CODE- 258: Performed by: INTERNAL MEDICINE

## 2022-04-20 PROCEDURE — 74011250636 HC RX REV CODE- 250/636: Performed by: INTERNAL MEDICINE

## 2022-04-20 PROCEDURE — 36415 COLL VENOUS BLD VENIPUNCTURE: CPT

## 2022-04-20 PROCEDURE — 74011000250 HC RX REV CODE- 250: Performed by: FAMILY MEDICINE

## 2022-04-20 PROCEDURE — 85384 FIBRINOGEN ACTIVITY: CPT

## 2022-04-20 PROCEDURE — 74011250636 HC RX REV CODE- 250/636: Performed by: OBSTETRICS & GYNECOLOGY

## 2022-04-20 PROCEDURE — 74011250636 HC RX REV CODE- 250/636: Performed by: FAMILY MEDICINE

## 2022-04-20 PROCEDURE — 74011000250 HC RX REV CODE- 250: Performed by: INTERNAL MEDICINE

## 2022-04-20 PROCEDURE — 85610 PROTHROMBIN TIME: CPT

## 2022-04-20 PROCEDURE — 74011000250 HC RX REV CODE- 250: Performed by: STUDENT IN AN ORGANIZED HEALTH CARE EDUCATION/TRAINING PROGRAM

## 2022-04-20 PROCEDURE — P9016 RBC LEUKOCYTES REDUCED: HCPCS

## 2022-04-20 PROCEDURE — 85025 COMPLETE CBC W/AUTO DIFF WBC: CPT

## 2022-04-20 PROCEDURE — 85018 HEMOGLOBIN: CPT

## 2022-04-20 PROCEDURE — 36430 TRANSFUSION BLD/BLD COMPNT: CPT

## 2022-04-20 PROCEDURE — 87205 SMEAR GRAM STAIN: CPT

## 2022-04-20 PROCEDURE — 80053 COMPREHEN METABOLIC PANEL: CPT

## 2022-04-20 PROCEDURE — 89050 BODY FLUID CELL COUNT: CPT

## 2022-04-20 PROCEDURE — 74011250637 HC RX REV CODE- 250/637: Performed by: HOSPITALIST

## 2022-04-20 PROCEDURE — 65610000006 HC RM INTENSIVE CARE

## 2022-04-20 RX ORDER — CALCITRIOL 0.25 UG/1
1.5 CAPSULE ORAL DAILY
Status: DISCONTINUED | OUTPATIENT
Start: 2022-04-21 | End: 2022-04-22 | Stop reason: HOSPADM

## 2022-04-20 RX ORDER — DIPHENHYDRAMINE HYDROCHLORIDE 50 MG/ML
12.5 INJECTION, SOLUTION INTRAMUSCULAR; INTRAVENOUS
Status: DISCONTINUED | OUTPATIENT
Start: 2022-04-20 | End: 2022-04-22 | Stop reason: HOSPADM

## 2022-04-20 RX ORDER — DIVALPROEX SODIUM 500 MG/1
500 TABLET, DELAYED RELEASE ORAL 2 TIMES DAILY
Status: DISCONTINUED | OUTPATIENT
Start: 2022-04-20 | End: 2022-04-22 | Stop reason: HOSPADM

## 2022-04-20 RX ORDER — SODIUM CHLORIDE 9 MG/ML
250 INJECTION, SOLUTION INTRAVENOUS AS NEEDED
Status: DISCONTINUED | OUTPATIENT
Start: 2022-04-20 | End: 2022-04-21 | Stop reason: SDUPTHER

## 2022-04-20 RX ORDER — LABETALOL 200 MG/1
200 TABLET, FILM COATED ORAL 3 TIMES DAILY
Status: DISCONTINUED | OUTPATIENT
Start: 2022-04-20 | End: 2022-04-22 | Stop reason: HOSPADM

## 2022-04-20 RX ORDER — LANOLIN ALCOHOL/MO/W.PET/CERES
1 CREAM (GRAM) TOPICAL 2 TIMES DAILY WITH MEALS
Status: DISCONTINUED | OUTPATIENT
Start: 2022-04-20 | End: 2022-04-22 | Stop reason: HOSPADM

## 2022-04-20 RX ORDER — HYDROXYCHLOROQUINE SULFATE 200 MG/1
200 TABLET, FILM COATED ORAL
Status: DISCONTINUED | OUTPATIENT
Start: 2022-04-20 | End: 2022-04-22 | Stop reason: HOSPADM

## 2022-04-20 RX ORDER — HYDRALAZINE HYDROCHLORIDE 20 MG/ML
20 INJECTION INTRAMUSCULAR; INTRAVENOUS
Status: DISCONTINUED | OUTPATIENT
Start: 2022-04-20 | End: 2022-04-22 | Stop reason: HOSPADM

## 2022-04-20 RX ORDER — MEDROXYPROGESTERONE ACETATE 150 MG/ML
150 INJECTION, SUSPENSION INTRAMUSCULAR ONCE
Status: COMPLETED | OUTPATIENT
Start: 2022-04-20 | End: 2022-04-20

## 2022-04-20 RX ORDER — LEVOTHYROXINE SODIUM 100 UG/1
100 TABLET ORAL
Status: DISCONTINUED | OUTPATIENT
Start: 2022-04-21 | End: 2022-04-22 | Stop reason: HOSPADM

## 2022-04-20 RX ORDER — METOPROLOL TARTRATE 5 MG/5ML
2.5 INJECTION INTRAVENOUS
Status: DISCONTINUED | OUTPATIENT
Start: 2022-04-20 | End: 2022-04-22 | Stop reason: HOSPADM

## 2022-04-20 RX ORDER — CALCIUM CARBONATE 500(1250)
1500 TABLET ORAL
Status: DISCONTINUED | OUTPATIENT
Start: 2022-04-20 | End: 2022-04-22 | Stop reason: HOSPADM

## 2022-04-20 RX ORDER — SEVELAMER CARBONATE 800 MG/1
800 TABLET, FILM COATED ORAL
Status: DISCONTINUED | OUTPATIENT
Start: 2022-04-20 | End: 2022-04-22 | Stop reason: HOSPADM

## 2022-04-20 RX ORDER — ALBUTEROL SULFATE 90 UG/1
2 AEROSOL, METERED RESPIRATORY (INHALATION)
Status: DISCONTINUED | OUTPATIENT
Start: 2022-04-20 | End: 2022-04-22 | Stop reason: HOSPADM

## 2022-04-20 RX ORDER — AMLODIPINE BESYLATE 5 MG/1
10 TABLET ORAL
Status: DISCONTINUED | OUTPATIENT
Start: 2022-04-20 | End: 2022-04-22 | Stop reason: HOSPADM

## 2022-04-20 RX ORDER — ESCITALOPRAM OXALATE 10 MG/1
10 TABLET ORAL DAILY
Status: DISCONTINUED | OUTPATIENT
Start: 2022-04-21 | End: 2022-04-22 | Stop reason: HOSPADM

## 2022-04-20 RX ORDER — BUMETANIDE 1 MG/1
2 TABLET ORAL DAILY
Status: DISCONTINUED | OUTPATIENT
Start: 2022-04-21 | End: 2022-04-20

## 2022-04-20 RX ORDER — CALCITRIOL 0.25 UG/1
1.5 CAPSULE ORAL 2 TIMES DAILY
Status: DISCONTINUED | OUTPATIENT
Start: 2022-04-20 | End: 2022-04-20

## 2022-04-20 RX ADMIN — MEDROXYPROGESTERONE ACETATE 150 MG: 150 INJECTION, SUSPENSION, EXTENDED RELEASE INTRAMUSCULAR at 18:05

## 2022-04-20 RX ADMIN — OXYCODONE 10 MG: 5 TABLET ORAL at 18:09

## 2022-04-20 RX ADMIN — CALCIUM 1500 MG: 500 TABLET ORAL at 22:23

## 2022-04-20 RX ADMIN — PIPERACILLIN AND TAZOBACTAM 3.38 G: 3; .375 INJECTION, POWDER, LYOPHILIZED, FOR SOLUTION INTRAVENOUS at 15:47

## 2022-04-20 RX ADMIN — SODIUM CHLORIDE 5 MG/HR: 9 INJECTION, SOLUTION INTRAVENOUS at 18:10

## 2022-04-20 RX ADMIN — HYDROXYCHLOROQUINE SULFATE 200 MG: 200 TABLET, FILM COATED ORAL at 22:23

## 2022-04-20 RX ADMIN — CALCIUM 1500 MG: 500 TABLET ORAL at 18:05

## 2022-04-20 RX ADMIN — FERROUS SULFATE TAB 325 MG (65 MG ELEMENTAL FE) 325 MG: 325 (65 FE) TAB at 18:05

## 2022-04-20 RX ADMIN — BUMETANIDE 2 MG: 0.25 INJECTION, SOLUTION INTRAMUSCULAR; INTRAVENOUS at 22:22

## 2022-04-20 RX ADMIN — METOPROLOL TARTRATE 2.5 MG: 5 INJECTION INTRAVENOUS at 11:44

## 2022-04-20 RX ADMIN — LABETALOL HYDROCHLORIDE 200 MG: 100 TABLET, FILM COATED ORAL at 15:47

## 2022-04-20 RX ADMIN — DIPHENHYDRAMINE HYDROCHLORIDE 12.5 MG: 50 INJECTION, SOLUTION INTRAMUSCULAR; INTRAVENOUS at 23:58

## 2022-04-20 RX ADMIN — SODIUM CHLORIDE 7.5 MG/HR: 9 INJECTION, SOLUTION INTRAVENOUS at 22:19

## 2022-04-20 RX ADMIN — FAMOTIDINE 20 MG: 20 TABLET, FILM COATED ORAL at 08:42

## 2022-04-20 RX ADMIN — ONDANSETRON 4 MG: 4 TABLET, ORALLY DISINTEGRATING ORAL at 23:04

## 2022-04-20 RX ADMIN — HYDRALAZINE HYDROCHLORIDE 10 MG: 20 INJECTION INTRAMUSCULAR; INTRAVENOUS at 08:42

## 2022-04-20 RX ADMIN — AMLODIPINE BESYLATE 10 MG: 5 TABLET ORAL at 13:18

## 2022-04-20 RX ADMIN — SEVELAMER CARBONATE 800 MG: 800 TABLET, FILM COATED ORAL at 18:05

## 2022-04-20 RX ADMIN — OXYCODONE 10 MG: 5 TABLET ORAL at 23:04

## 2022-04-20 RX ADMIN — BUMETANIDE 2 MG: 0.25 INJECTION, SOLUTION INTRAMUSCULAR; INTRAVENOUS at 08:31

## 2022-04-20 RX ADMIN — ONDANSETRON 4 MG: 2 INJECTION INTRAMUSCULAR; INTRAVENOUS at 18:18

## 2022-04-20 RX ADMIN — ONDANSETRON 4 MG: 2 INJECTION INTRAMUSCULAR; INTRAVENOUS at 08:44

## 2022-04-20 RX ADMIN — PIPERACILLIN AND TAZOBACTAM 3.38 G: 3; .375 INJECTION, POWDER, LYOPHILIZED, FOR SOLUTION INTRAVENOUS at 04:28

## 2022-04-20 RX ADMIN — HYDRALAZINE HYDROCHLORIDE 10 MG: 20 INJECTION INTRAMUSCULAR; INTRAVENOUS at 14:49

## 2022-04-20 RX ADMIN — SODIUM CHLORIDE, PRESERVATIVE FREE 10 ML: 5 INJECTION INTRAVENOUS at 14:00

## 2022-04-20 RX ADMIN — DIVALPROEX SODIUM 500 MG: 500 TABLET, DELAYED RELEASE ORAL at 22:25

## 2022-04-20 RX ADMIN — OXYCODONE 10 MG: 5 TABLET ORAL at 08:44

## 2022-04-20 RX ADMIN — SODIUM CHLORIDE, PRESERVATIVE FREE 10 ML: 5 INJECTION INTRAVENOUS at 05:48

## 2022-04-20 RX ADMIN — LABETALOL HYDROCHLORIDE 200 MG: 100 TABLET, FILM COATED ORAL at 22:23

## 2022-04-20 RX ADMIN — SODIUM CHLORIDE, PRESERVATIVE FREE 10 ML: 5 INJECTION INTRAVENOUS at 22:23

## 2022-04-20 NOTE — CONSULTS
[facility]  Pulmonary, Critical Care, and Sleep Medicine    Name: Aravind Parikh MRN: [id number]   : 1995 Hospital: [facility] [place]    Date: 2022  Room: [address] [place] [address] Note                                              Consult requesting physician: Hospitalist    Reason for Consult: acute bleeding PD catheter malfunction, renal failure , severe uremia , acute anemia      IMPRESSION:   · Acute bleeding  · Severe anemia  · End-stage renal disease  · Peritoneal dialysis malfunction  · Hyponatremia   · Hyperkalemia  ·   Patient Active Problem List   Diagnosis Code    Encephalopathy G93.40    Lupus ([address] M32.9    ESRD on dialysis ([address] N18.6, Z99.2    GERD (gastroesophageal reflux disease) K21.9    Hypertension I10    Thyroid disease E07.9    Psychiatric disorder F99    Anemia D64.9    Peritoneal hematoma S36.81XA    Peritoneal dialysis catheter dysfunction (HCC) T85.611A    Acute bleeding R58    Uncontrolled hypertension I10    Lupus nephritis ([facility] M32.14    Hemoperitoneum K66.1    Abnormal pelvic ultrasound R93.89           · Code status:  Full code Code      RECOMMENDATIONS:   Respiratory: Stable , history of asthma resume in hospital no wheezing   Currently  on RA  Protecting airway. Keep SPO2 >=92%. HOB 30 degree elevation all the time. Aggressive pulmonary toileting. Aspiration precautions. Incentive spirometry. CVS: Hypertension, ESRD missed HD will resume home medication and HD today  Prn add metoprol already on hydratazine   ID: PD placed no fever but bleeding high risk abdominal sepsis zosyn   Pd fluid blood send for culture  [facility] so far negative   Sepsis protocol  Sepsis bundle and protocol followed. Follow serial lactic acid, frequent BMP check, fluid resuscitation. Follow cultures. Deescalate antibiotic when appropriate.   Hematology/Oncology:   acute on chronic anemia PD cath ? intraabdominal bleeding  Ct revised also left ovary bleeding? Discussed with both surgical and gyn team possible surgical intervention  Hb 4.9 on admission  PRBC  3 units give now 7.5   FFP 2 units given  Vit k given   Coagulopathy resolve   H/h stat every 6 hrs   INR now normal   Renal: ESRD due to lupus since 2021  Was on HD AV fistual wanted PD second PD cath  Urgent HD yeserday due to severe uremia, fluid overload   GI/: yes  Endocrine: Monitor BS. SSI. Neurology: awake and alert    Pain/Sedation: prn  Skin/Wound:post op wound PD   Electrolytes: Replace electrolytes per ICU electrolyte replacement protocol. Per renal   IVF: PRBC avoid IVF  Nutrition: npo until surgery cleared   Prophylaxis: DVT Prophylaxis: SCD/. GI Prophylaxis: Protonix/yes. Restraints: none    Lines/Tubes: PIV  Other:  Gyn: case discussed with ob/gyn will evaluate today    OTHER: Moderate intraperitoneal free fluid and small amount of intraperitoneal  free air, likely related to peritoneal dialysis, with PD cannula in place coiled  in the right pelvis.      BONES: No acute or aggressive osseous abnormalities identified. _______________     IMPRESSION     1. Cystic/hemorrhagic lesion in the left adnexa, potentially hemorrhagic ovarian  cyst. Please note that pelvic/transvaginal ultrasound is most sensitive to  evaluate the gynecologic structures, including evaluation for ovarian cyst or  torsion if clinically suspected.     2. Moderate amount of intraperitoneal free fluid and small amount of  intraperitoneal free air, which can simply be related to known peritoneal  dialysis. Will defer respective systems problem management to primary and other respective consultant and follow patient in ICU with primary and other medical team.  Further recommendations will be based on the patient's response to recommended treatment and results of the investigation ordered. Quality Care: PPI, DVT prophylaxis, HOB elevated, Infection control all reviewed and addressed.     Care of plan d/w hospitalist team, RN, RT, MDR.  D/w patient and hospitalist (answered all questions to satisfaction). High complexity decision making was performed during the evaluation of this patient at high risk for decompensation with multiple organ involvement. Total critical care time spent rendering care exclusive of procedures/family discussion/coordination of care: 59 minutes. Subjective/History of Present Illness:     Patient is a 32 y.o. female with PMHx significant for SLE, asthma,  end-stage renal disease on peritoneal dialysis, still has fully functional left arm fistula, since last year on the PD, new peritoneal catheter placed last Friday, abdominal pain, weakness since then the procedure. Found to have retroperitoneal bleed hematoma. Patient also has not had dialysis since 4/14 significant uremia. Critical hemoglobin on admission. CT of abdomen and pelvis personally reviewed. Nephrology and general surgery on board. Transfusion to keep hemoglobin above 7. Surgery possibly today or tomorrow as per surgical team.  Critical hb 4.9  additionally left ovarian abnormalities for farther investigation of possible bleeding/tortion. 4/20/2022:  Evaluated in ICU bed 6. Awake and alert. Complaining of abdominal pain. Whole body anasarca. Case discussed with both surgery and gyn  Surgical decision pending  PRBC 3 units given finally better hb  Still blood PD fluid send for culure  I/O last 24 hrs: Intake/Output Summary (Last 24 hours) at 4/20/2022 1445  Last data filed at 4/20/2022 1351  Gross per 24 hour   Intake 1571.3 ml   Output 4350 ml   Net -2778.7 ml           History taken from patient and EMR    Review of Systems:  A comprehensive review of systems was negative except for that written in the HPI.        Review of Systems:   HEENT: No epistaxis, no nasal drainage, no difficulty in swallowing, no redness in eyes  Respiratory: as above  Cardiovascular: no chest pain, no palpitations, no chronic leg edema, no syncope  Gastrointestinal: yes  abd pain, no vomiting, no diarrhea, no bleeding symptoms  Genitourinary: No urinary symptoms or hematuria  Musculoskeletal: Neg  Neurological: No focal weakness, no seizures, no headaches  Behvioral/Psych: No anxiety, no depression  General : No fever, no chills, no weight loss, no night sweats     Allergies   Allergen Reactions    Banana Itching and Swelling    Clindamycin Diarrhea    Sulfa (Sulfonamide Antibiotics) Hives     Bactrim      Past Medical History:   Diagnosis Date    A-V fistula (HCC)     LEFT SIDE     Anxiety and depression     Asthma     Chronic kidney disease     ESRD- fresinius    Chronic pain     GERD (gastroesophageal reflux disease)     History of blood transfusion 2017, 2020    Hypertension     Lupus ([address]     Psychiatric disorder     Thyroid disease     hypo      Past Surgical History:   Procedure Laterality Date    HX HEENT  2021    oral surgery    HX UROLOGICAL  51728, 2019    biopsies of kidneys    HX VASCULAR ACCESS Left 2020    dialysis shunt (arm)    IR INSERT INTRAPERITONEAL CATH PERM      ME ABDOMEN SURGERY PROC UNLISTED  2015    Drain a boil      Social History     Tobacco Use    Smoking status: Current Every Day Smoker    Smokeless tobacco: Never Used    Tobacco comment: 5 Black and milds daily d/t recent stress    Substance Use Topics    Alcohol use: Not Currently     Comment: ocassionally      Family History   Problem Relation Age of Onset    Kidney Disease Father     Lupus Father       Prior to Admission medications    Medication Sig Start Date End Date Taking? Authorizing Provider   ondansetron (ZOFRAN ODT) 8 mg disintegrating tablet Take 1 Tablet by mouth every eight (8) hours as needed for Nausea or Vomiting. Indications: prevent nausea and vomiting after surgery 4/15/22   Theodora Trent MD   calcitRIOL (ROCALTROL) 0.5 mcg capsule Take 3 Capsules by mouth daily for 30 days.   Patient taking differently: Take 1.5 mcg by mouth two (2) times a day. 3/24/22 4/23/22  Juancho Washington MD   amLODIPine (NORVASC) 10 mg tablet Take 1 Tablet by mouth daily (after lunch). Indications: high blood pressure 3/24/22   Juancho Washington MD   bumetanide (BUMEX) 2 mg tablet Take 1 Tablet by mouth daily. 3/24/22   Juancho Washington MD   sevelamer carbonate (RENVELA) 800 mg tab tab Take 1 Tablet by mouth three (3) times daily (with meals) for 30 days. 3/24/22 4/23/22  Juancho Washington MD   sodium bicarbonate 650 mg tablet Take 1 Tablet by mouth two (2) times a day for 30 days. 3/24/22 4/23/22  Juancho Washington MD   calcium carbonate (OS-VIRGILIO) 500 mg calcium (1,250 mg) tablet Take 3 Tablets by mouth Before breakfast, lunch, dinner and at bedtime. 2/22/22   Provider, Historical   ondansetron (ZOFRAN ODT) 8 mg disintegrating tablet Take 1 Tablet by mouth every eight (8) hours as needed for Nausea or Vomiting. Indications: prevent nausea and vomiting after surgery 6/3/21   Duane Keel, MD   albuterol (PROVENTIL HFA, VENTOLIN HFA, PROAIR HFA) 90 mcg/actuation inhaler Take  by inhalation as needed for Wheezing. Provider, Historical   budesonide (PULMICORT) 0.25 mg/2 mL nbsp by Nebulization route daily (after lunch). Provider, Historical   cetirizine (ZYRTEC) 10 mg tablet Take  by mouth daily (after lunch). Provider, Historical   divalproex ER (Depakote ER) 500 mg ER tablet Take 500 mg by mouth two (2) times a day. Indications: bipolar disorder in remission    Provider, Historical   escitalopram oxalate (Lexapro) 10 mg tablet Take 10 mg by mouth daily. Provider, Historical   famotidine (PEPCID) 20 mg tablet Take 20 mg by mouth nightly. Provider, Historical   ferrous sulfate 324 mg (65 mg iron) tablet Take  by mouth two (2) times daily (with meals). Provider, Historical   fluticasone propion-salmeteroL (Advair Diskus) 250-50 mcg/dose diskus inhaler Take 1 Puff by inhalation every twelve (12) hours.     Provider, Historical   fluticasone propionate (FLONASE NA) by Nasal route as needed. Provider, Historical   hydrOXYchloroQUINE (Plaquenil) 200 mg tablet Take 200 mg by mouth nightly. Indications: systemic lupus erythematosus, an autoimmune disease, Lupus    Provider, Historical   labetaloL (NORMODYNE) 200 mg tablet Take 200 mg by mouth three (3) times daily. Indications: high blood pressure    Provider, Historical   levothyroxine (SYNTHROID) 100 mcg tablet Take 100 mcg by mouth Daily (before breakfast). Provider, Historical   pantoprazole (PROTONIX) 40 mg tablet Take 40 mg by mouth two (2) times a day.     Provider, Historical     Current Facility-Administered Medications   Medication Dose Route Frequency    amLODIPine (NORVASC) tablet 10 mg  10 mg Oral PCL    calcium carbonate (OS-VIRGILIO) tablet 1,500 mg [elemental]  1,500 mg Oral AC&HS    [START ON 2022] escitalopram oxalate (LEXAPRO) tablet 10 mg  10 mg Oral DAILY    ferrous sulfate tablet 325 mg  1 Tablet Oral BID WITH MEALS    sevelamer carbonate (RENVELA) tab 800 mg  800 mg Oral TID WITH MEALS    labetaloL (NORMODYNE) tablet 200 mg  200 mg Oral TID    [START ON 2022] levothyroxine (SYNTHROID) tablet 100 mcg  100 mcg Oral ACB    hydrOXYchloroQUINE (PLAQUENIL) tablet 200 mg  200 mg Oral QHS    divalproex DR (DEPAKOTE) tablet 500 mg  500 mg Oral BID    [START ON 2022] calcitRIOL (ROCALTROL) capsule 1.5 mcg  1.5 mcg Oral DAILY    bumetanide (BUMEX) injection 2 mg  2 mg IntraVENous Q12H    piperacillin-tazobactam (ZOSYN) 3.375 g in 0.9% sodium chloride (MBP/ADV) 100 mL MBP  3.375 g IntraVENous Q12H    sodium chloride (NS) flush 5-40 mL  5-40 mL IntraVENous Q8H    famotidine (PEPCID) tablet 20 mg  20 mg Oral DAILY         Objective:   Vital Signs:    Visit Vitals  BP (!) 190/95   Pulse 86   Temp 99 °F (37.2 °C)   Resp 17   Wt 78.9 kg (174 lb)   LMP 2022 (Approximate)   SpO2 100%   BMI 28.08 kg/m²       O2 Device: None (Room air)       Temp (24hrs), Av.2 °F (36.8 °C), Min:97.7 °F (36.5 °C), Max:99 °F (37.2 °C)       Intake/Output:   Last shift:      04/20 0701 - 04/20 1900  In: 321.3   Out: 1100 [Urine:1100]    Last 3 shifts: 04/18 1901 - 04/20 0700  In: 1250   Out: 3250 [Urine:1250]      Intake/Output Summary (Last 24 hours) at 4/20/2022 1445  Last data filed at 4/20/2022 1351  Gross per 24 hour   Intake 1571.3 ml   Output 4350 ml   Net -2778.7 ml       Last 3 Recorded Weights in this Encounter    04/19/22 1147   Weight: 78.9 kg (174 lb)             No results for input(s): PHI, PHI, POC2, PCO2I, PO2, PO2I, HCO3, HCO3I, FIO2, FIO2I in the last 72 hours. Physical Exam:     Impresson general : Alert, Awake, in moderate distress abdominal dyscontrol   Head:   Normocephalic,atraumatic. ENT:   EOM  no scleral icterus, no pallor, no cyanosis. Nose:   No sinus tenderness  Throat:  Oropharynx ,mucosa, and tongue normal. No oral thrush. Neck:   Supple, symmetric. Lymph nodes. Trachea is midline  Lung: Moderate air entry bilateral equal No rales. No rhonchi. No wheezing. No stridors. No prolongded expiration. No accessory muscle use. Heart:   Regular  rate & rhythm. S1 S2 present. No murmur. No JVD. Abdomen:  Distended /PD cath right upper quadrant with small drainage  , ascites, decresed BS. No masses. liver and spleen  Extremities:  2 plus pedal edema. No cyanosis. No clubbing. Pulses: 2+ and symmetric in DP. Capillary refill: normal  Lymphatic:  neck and supraclavicular    Musculoskeletal: No joint swelling. No tenderness. Skin:   Lesion Color, texture, turgor normal. No rashes or lesions.        Data:       Recent Results (from the past 24 hour(s))   CULTURE, BLOOD    Collection Time: 04/19/22  2:54 PM    Specimen: Blood   Result Value Ref Range    Special Requests: NO SPECIAL REQUESTS      Culture result: NO GROWTH AFTER 18 HOURS     PLASMA, ALLOCATE    Collection Time: 04/19/22  4:00 PM   Result Value Ref Range    Unit number [id number]     Blood component type FP 24h, Thaw     Unit division 00     Status of unit TRANSFUSED     Unit number [id number]     Blood component type FP 24h,Thaw2     Unit division 00     Status of unit ALLOCATED    URINALYSIS W/MICROSCOPIC    Collection Time: 04/19/22  5:59 PM   Result Value Ref Range    Color YELLOW      Appearance CLEAR      Specific gravity 1.006 1.005 - 1.030      pH (UA) 7.0 5.0 - 8.0      Protein 100 (A) NEG mg/dL    Glucose Negative NEG mg/dL    Ketone Negative NEG mg/dL    Bilirubin Negative NEG      Blood MODERATE (A) NEG      Urobilinogen 0.2 0.2 - 1.0 EU/dL    Nitrites Negative NEG      Leukocyte Esterase TRACE (A) NEG      WBC 0 to 3 0 - 5 /hpf    RBC 1 to 3 0 - 5 /hpf    Epithelial cells 2+ 0 - 5 /lpf    Bacteria FEW (A) NEG /hpf   PROCALCITONIN    Collection Time: 04/19/22  6:45 PM   Result Value Ref Range    Procalcitonin 6.43 ng/mL   CBC WITH AUTOMATED DIFF    Collection Time: 04/19/22  6:45 PM   Result Value Ref Range    WBC 5.8 4.6 - 13.2 K/uL    RBC 2.01 (L) 4.20 - 5.30 M/uL    HGB 5.5 (LL) 12.0 - 16.0 g/dL    HCT 16.8 (LL) 35.0 - 45.0 %    MCV 83.6 78.0 - 100.0 FL    MCH 27.4 24.0 - 34.0 PG    MCHC 32.7 31.0 - 37.0 g/dL    RDW 14.6 (H) 11.6 - 14.5 %    PLATELET 98 (L) 734 - 420 K/uL    NRBC 0.0 0  WBC    ABSOLUTE NRBC 0.00 0.00 - 0.01 K/uL    NEUTROPHILS 83 (H) 40 - 73 %    LYMPHOCYTES 6 (L) 21 - 52 %    MONOCYTES 8 3 - 10 %    EOSINOPHILS 2 0 - 5 %    BASOPHILS 0 0 - 2 %    IMMATURE GRANULOCYTES 1 (H) 0.0 - 0.5 %    ABS. NEUTROPHILS 4.8 1.8 - 8.0 K/UL    ABS. LYMPHOCYTES 0.3 (L) 0.9 - 3.6 K/UL    ABS. MONOCYTES 0.5 0.05 - 1.2 K/UL    ABS. EOSINOPHILS 0.1 0.0 - 0.4 K/UL    ABS. BASOPHILS 0.0 0.0 - 0.1 K/UL    ABS. IMM.  GRANS. 0.1 (H) 0.00 - 0.04 K/UL    DF AUTOMATED     METABOLIC PANEL, COMPREHENSIVE    Collection Time: 04/19/22  6:45 PM   Result Value Ref Range    Sodium 132 (L) 136 - 145 mmol/L    Potassium 4.0 3.5 - 5.5 mmol/L    Chloride 101 100 - 111 mmol/L    CO2 25 21 - 32 mmol/L    Anion gap 6 3.0 - 18 mmol/L    Glucose 86 74 - 99 mg/dL    BUN 72 (H) 7.0 - 18 MG/DL    Creatinine 14.90 (H) 0.6 - 1.3 MG/DL    BUN/Creatinine ratio 5 (L) 12 - 20      GFR est AA 4 (L) >60 ml/min/1.73m2    GFR est non-AA 3 (L) >60 ml/min/1.73m2    Calcium 8.2 (L) 8.5 - 10.1 MG/DL    Bilirubin, total 0.5 0.2 - 1.0 MG/DL    ALT (SGPT) <6 (L) 13 - 56 U/L    AST (SGOT) 12 10 - 38 U/L    Alk. phosphatase 59 45 - 117 U/L    Protein, total 5.9 (L) 6.4 - 8.2 g/dL    Albumin 1.9 (L) 3.4 - 5.0 g/dL    Globulin 4.0 2.0 - 4.0 g/dL    A-G Ratio 0.5 (L) 0.8 - 1.7     HEP B SURFACE AG    Collection Time: 04/19/22  6:45 PM   Result Value Ref Range    Hepatitis B surface Ag <0.10 <1.00 Index    Hep B surface Ag Interp. Negative NEG     HEP B SURFACE AB    Collection Time: 04/19/22  6:45 PM   Result Value Ref Range    Hepatitis B surface Ab 186.78 >10.0 mIU/mL    Hep B surface Ab Interp. Positive POS      Hep B surface Ab comment        Samples with a  value of 10 mIU/mL or greater are considered positive (protective immunity) in accordance with the CDC guidelines. RBC, ALLOCATE    Collection Time: 04/19/22  7:30 PM   Result Value Ref Range    HISTORY CHECKED?  Historical check performed    PLATELETS, ALLOCATE    Collection Time: 04/19/22  7:30 PM   Result Value Ref Range    Unit number [id number]     Blood component type PLP,LR PSTC2     Unit division 00     Status of unit TRANSFUSED    PROTHROMBIN TIME + INR    Collection Time: 04/20/22  2:59 AM   Result Value Ref Range    Prothrombin time 14.7 11.5 - 15.2 sec    INR 1.2 0.8 - 1.2     FIBRINOGEN    Collection Time: 04/20/22  2:59 AM   Result Value Ref Range    Fibrinogen 657 (H) 210 - 285 mg/dL   METABOLIC PANEL, COMPREHENSIVE    Collection Time: 04/20/22  2:59 AM   Result Value Ref Range    Sodium 138 136 - 145 mmol/L    Potassium 4.1 3.5 - 5.5 mmol/L    Chloride 105 100 - 111 mmol/L    CO2 26 21 - 32 mmol/L    Anion gap 7 3.0 - 18 mmol/L    Glucose 80 74 - 99 mg/dL    BUN 48 (H) 7.0 - 18 MG/DL    Creatinine 12.30 (H) 0.6 - 1.3 MG/DL    BUN/Creatinine ratio 4 (L) 12 - 20      GFR est AA 4 (L) >60 ml/min/1.73m2    GFR est non-AA 4 (L) >60 ml/min/1.73m2    Calcium 8.1 (L) 8.5 - 10.1 MG/DL    Bilirubin, total 0.5 0.2 - 1.0 MG/DL    ALT (SGPT) <6 (L) 13 - 56 U/L    AST (SGOT) 14 10 - 38 U/L    Alk. phosphatase 62 45 - 117 U/L    Protein, total 5.4 (L) 6.4 - 8.2 g/dL    Albumin 2.0 (L) 3.4 - 5.0 g/dL    Globulin 3.4 2.0 - 4.0 g/dL    A-G Ratio 0.6 (L) 0.8 - 1.7     CBC WITH AUTOMATED DIFF    Collection Time: 04/20/22  2:59 AM   Result Value Ref Range    WBC 7.1 4.6 - 13.2 K/uL    RBC 2.31 (L) 4.20 - 5.30 M/uL    HGB 6.4 (L) 12.0 - 16.0 g/dL    HCT 19.3 (L) 35.0 - 45.0 %    MCV 83.5 78.0 - 100.0 FL    MCH 27.7 24.0 - 34.0 PG    MCHC 33.2 31.0 - 37.0 g/dL    RDW 14.5 11.6 - 14.5 %    PLATELET 307 (L) 604 - 420 K/uL    NRBC 0.0 0  WBC    ABSOLUTE NRBC 0.00 0.00 - 0.01 K/uL    NEUTROPHILS 84 (H) 40 - 73 %    LYMPHOCYTES 5 (L) 21 - 52 %    MONOCYTES 8 3 - 10 %    EOSINOPHILS 2 0 - 5 %    BASOPHILS 0 0 - 2 %    IMMATURE GRANULOCYTES 1 (H) 0.0 - 0.5 %    ABS. NEUTROPHILS 6.0 1.8 - 8.0 K/UL    ABS. LYMPHOCYTES 0.4 (L) 0.9 - 3.6 K/UL    ABS. MONOCYTES 0.6 0.05 - 1.2 K/UL    ABS. EOSINOPHILS 0.1 0.0 - 0.4 K/UL    ABS. BASOPHILS 0.0 0.0 - 0.1 K/UL    ABS. IMM. GRANS. 0.1 (H) 0.00 - 0.04 K/UL    DF AUTOMATED     RBC, ALLOCATE    Collection Time: 04/20/22  4:15 AM   Result Value Ref Range    HISTORY CHECKED? Historical check performed    HGB & HCT    Collection Time: 04/20/22  9:39 AM   Result Value Ref Range    HGB 7.3 (L) 12.0 - 16.0 g/dL    HCT 21.2 (L) 35.0 - 45.0 %   CELL COUNT, BODY FLUID    Collection Time: 04/20/22 10:17 AM   Result Value Ref Range    BODY FLUID TYPE PERITONEAL CAVITY      FLUID COLOR RED      FLUID APPEARANCE TURBID      FLUID RBC CT. (A) NRRE /cu mm     SPECIMEN GROSSLY BLOODY. RBC'S TOO NUMEROUS TO COUNT.     FLUID NUCLEATED CELLS 2,573 (A) NRRE /cu mm         Chemistry Recent Labs 04/20/22  0259 04/19/22  1845 04/19/22  1225   GLU 80 86 85    132* 130*   K 4.1 4.0 5.2    101 97*   CO2 26 25 20*   BUN 48* 72* 95*   CREA 12.30* 14.90* 20.30*   CA 8.1* 8.2* 7.2*   AGAP 7 6 13   BUCR 4* 5* 5*   AP 62 59 64   TP 5.4* 5.9* 5.7*   ALB 2.0* 1.9* 2.0*   GLOB 3.4 4.0 3.7   AGRAT 0.6* 0.5* 0.5*        Lactic Acid Lactic acid   Date Value Ref Range Status   04/19/2022 0.5 0.4 - 2.0 MMOL/L Final     Recent Labs     04/19/22  1225   LAC 0.5        Liver Enzymes Protein, total   Date Value Ref Range Status   04/20/2022 5.4 (L) 6.4 - 8.2 g/dL Final     Albumin   Date Value Ref Range Status   04/20/2022 2.0 (L) 3.4 - 5.0 g/dL Final     Globulin   Date Value Ref Range Status   04/20/2022 3.4 2.0 - 4.0 g/dL Final     A-G Ratio   Date Value Ref Range Status   04/20/2022 0.6 (L) 0.8 - 1.7   Final     Alk. phosphatase   Date Value Ref Range Status   04/20/2022 62 45 - 117 U/L Final     Recent Labs     04/20/22  0259 04/19/22  1845 04/19/22  1225   TP 5.4* 5.9* 5.7*   ALB 2.0* 1.9* 2.0*   GLOB 3.4 4.0 3.7   AGRAT 0.6* 0.5* 0.5*   AP 62 59 64        CBC w/Diff Recent Labs     04/20/22  0939 04/20/22  0259 04/19/22  1845 04/19/22  1225 04/19/22  1225   WBC  --  7.1 5.8  --  8.4   RBC  --  2.31* 2.01*  --  1.86*   HGB 7.3* 6.4* 5.5*   < > 4.9*   HCT 21.2* 19.3* 16.8*   < > 15.5*   PLT  --  126* 98*  --  101*   GRANS  --  84* 83*  --  92*   LYMPH  --  5* 6*  --  3*   EOS  --  2 2  --  0    < > = values in this interval not displayed.         Cardiac Enzymes No results found for: CPK, CK, CKMMB, CKMB, RCK3, CKMBT, CKNDX, CKND1, CALEB, TROPT, TROIQ, MARTHA, TROPT, TNIPOC, BNP, BNPP     BNP No results found for: BNP, BNPP, XBNPT     Coagulation Recent Labs     04/20/22  0259 04/19/22  1225   PTP 14.7 16.6*   INR 1.2 1.4*   APTT  --  52.4*         Thyroid  No results found for: T4, T3U, TSH, TSHEXT, TSHEXT    No results found for: T4     Urinalysis Lab Results   Component Value Date/Time    Color YELLOW 04/19/2022 05:59 PM    Appearance CLEAR 04/19/2022 05:59 PM    Specific gravity 1.006 04/19/2022 05:59 PM    pH (UA) 7.0 04/19/2022 05:59 PM    Protein 100 (A) 04/19/2022 05:59 PM    Glucose Negative 04/19/2022 05:59 PM    Ketone Negative 04/19/2022 05:59 PM    Bilirubin Negative 04/19/2022 05:59 PM    Urobilinogen 0.2 04/19/2022 05:59 PM    Nitrites Negative 04/19/2022 05:59 PM    Leukocyte Esterase TRACE (A) 04/19/2022 05:59 PM    Epithelial cells 2+ 04/19/2022 05:59 PM    Bacteria FEW (A) 04/19/2022 05:59 PM    WBC 0 to 3 04/19/2022 05:59 PM    RBC 1 to 3 04/19/2022 05:59 PM        Micro  Recent Labs     04/19/22  1454 04/19/22  1225   CULT NO GROWTH AFTER 18 HOURS NO GROWTH AFTER 20 HOURS     Recent Labs     04/19/22  1454 04/19/22  1225   CULT NO GROWTH AFTER 18 HOURS NO GROWTH AFTER 20 HOURS          Culture data during this hospitalization. All Micro Results     Procedure Component Value Units Date/Time    CULTURE, BODY FLUID Dionisio Ee STAIN [[id number]] Collected: 04/20/22 1017    Order Status: Completed Specimen: Peritoneal Fluid Updated: 04/20/22 1355    CULTURE, BLOOD [[id number]] Collected: 04/19/22 1225    Order Status: Completed Specimen: Blood Updated: 04/20/22 0921     Special Requests: NO SPECIAL REQUESTS        Culture result: NO GROWTH AFTER 20 HOURS       CULTURE, BLOOD [[id number]] Collected: 04/19/22 1454    Order Status: Completed Specimen: Blood Updated: 04/20/22 0921     Special Requests: NO SPECIAL REQUESTS        Culture result: NO GROWTH AFTER 18 HOURS                  PFT       Ultrasound       LE Doppler       ECHO       Images report reviewed by me:  CT (Most Recent) (CT chest reviewed by me) Results from [place] encounter on 04/19/22    CT ABD PELV WO CONT    Narrative  EXAM: CT of the abdomen and pelvis.     HISTORY:  -Provided with order: abdominal pain, blood return from peritoneal dialysis port  -Additional: None    COMPARISON: 03/22/22    TECHNIQUE: Axial imaging was obtained through the abdomen and pelvis without  oral or intravenous contrast.  Please note that lack of oral contrast limits the  sensitivity for detection of bowel abnormalities. Lack of IV contrast limits  solid organ assessment and enhancement characteristics. Multiplanar reformats were generated. One or more dose reduction techniques were used on this CT: automated exposure  control, adjustment of the mAs and/or kVp according to patient size, and  iterative reconstruction techniques. The specific techniques used on this CT  exam have been documented in the patient's electronic medical record. Digital Imaging and Communications in Medicine (DICOM) format image data are  available to nonaffiliated external healthcare facilities or entities on a  secure, media free, reciprocally searchable basis with patient authorization for  at least a 12-month period after this study. FINDINGS:    LOWER CHEST: Unremarkable. LIVER: Unremarkable. BILIARY: Gallbladder: No calcified gallstones or surrounding inflammatory  changes identified. No biliary ductal dilation. SPLEEN: Unremarkable. PANCREAS: Unremarkable. ADRENALS: Unremarkable. KIDNEYS: Unremarkable. LYMPH NODES: No enlarged lymph nodes. VASCULATURE: limited in assessment without IV contrast.  Atherosclerotic aorta  without aneurysm. Unremarkable. GASTROINTESTINAL TRACT:  Esophagus/stomach/duodenum: unremarkable. Appendix: .  Small/Large bowel: No bowel dilation or wall thickening. PELVIC ORGANS:  Bladder: Unremarkable. Estimated 5.4 x 5.5 x 5.1 cm spherical structure with  layering hyperdensity (44 HU) in the left adnexa. Otherwise unremarkable    OTHER: Moderate intraperitoneal free fluid and small amount of intraperitoneal  free air, likely related to peritoneal dialysis, with PD cannula in place coiled  in the right pelvis. BONES: No acute or aggressive osseous abnormalities identified. _______________    Impression  1. Cystic/hemorrhagic lesion in the left adnexa, potentially hemorrhagic ovarian  cyst. Please note that pelvic/transvaginal ultrasound is most sensitive to  evaluate the gynecologic structures, including evaluation for ovarian cyst or  torsion if clinically suspected. 2. Moderate amount of intraperitoneal free fluid and small amount of  intraperitoneal free air, which can simply be related to known peritoneal  dialysis. CXR reviewed by me:  XR (Most Recent). CXR  reviewed by me and compared with previous CXR Results from Hospital Encounter encounter on 04/19/22    XR ABD (KUB)    Narrative  HISTORY:  -From Provider:peritoneal dialysis shunt problem  -Additional: None    Technique: Single portable frontal abdominal radiograph. Comparison study: None. Findings: Catheter tubing overlies the right abdomen, tip coiled in the right  lower quadrant, intact in visualized extent. .    Lung bases: Not included in field-of-view imaging. Bowel gas pattern: unremarkable in visualized extent. Relative paucity of bowel  in the pelvis and hazy increased density over the abdomen/pelvis may reflect  peritoneal dialysate. Other/osseous: Unremarkable. Impression  1. Right-sided catheter, presumed corresponding to known peritoneal dialysis  catheter. Minda Frankel MD  4/20/2022

## 2022-04-20 NOTE — PROGRESS NOTES
RENAL CONSULT PROGRESS NOTE   2022    Patient:  Ramsey Christianson  :  1995  Gender:  female  MRN #:  [id number]    Reason for Consult: ESRD for dialysis related care and significant anemia. Assessment:    Ramsey Christianson is a 32y.o. year old female with SLE , lupus nephritis leading to ESRD now on PD,  Hypertension, Anemia of CKD , secondary Hyperparathyroidism was sent from dialysis unit due to blood in peritoneal    fluid . She had diagnostic laparoscopy last Friday and new PD catheter was placed. She has abdominal pain and hemoglobin is 4.9 mg/dl most likely due to  intraperitoneal bleed. ESRD on PD   Hypertension   Post hemorrhagic anemia on top of anemia of CKD   Secondary Hyperparathyroidism        Subjective/Overnight events:   -she received 3 PRBC transfusion, 1 FFP and 1 platelets   - she had dialysis yesterday  Says some abdominal distension and pain   BP is stable        Plan:      ESRD - Patient examined and labs reviewed thsi jairo Serrano Does not have hyperkalemia and breathing is comfortable . No clinical and metabolic indication of dialysis .    Will plan for dialysis tomorrow   As long she is in house will plan hemodialysis on TTS  Will resume PD when bleeding resolves and appropriate to use PD catheter  Intake and output charting   Dose all meds for ESRD      Anemia - she has anemia of CKD, drop in hemoglobin now most likely intraperitoneal bleed, per CT abdomen - hemorrhagic ovarian cyst bleeding   Surgery and Gyn consult , Noted that surgery planning to take her to OR   Repeat hemoglobin now   Will give retacrit during HD   Transfusion prn to keep level above 7 gram/dl       Hypertension: she takes amlodipine , labetalol and diuretics at home which should be resumed after dialysis today if BP does not improve  CONTINUE  Bumex 2 mg IV bid   Close observation due to bleeding     Secondary Hyperparathyroidism: resume calcitriol 1 mcg daily once it is safe to take oral meds Hyperphosphatemia- she takes sevelamer 800 mg TID   Hypocalcemia- she has h/o persistent Hypocalcemia takes calcium carbonate 1500 mg TID at home   Will resume these once she starts oral intake      Objective:    Visit Vitals  BP (!) 160/99   Pulse 88   Temp 98 °F (36.7 °C)   Resp 16   Wt 78.9 kg (174 lb)   LMP 03/28/2022 (Approximate)   SpO2 100%   BMI 28.08 kg/m²       Physical Exam:    Pt awake,  alert  HEENT: No JVD, anicteric sclera, no neck swelling  Lung: clear to auscultation, no crackles and wheeze  Heart: s1s2 regular ,no rubs or murmur  Abdomen: soft,  Mildly distended, , tenderness on around PD cath site . Ext:  Has ankle edema    CNS- Oriented to time , place and person     Laboratory Data:    Lab Results   Component Value Date    BUN 48 (H) 04/20/2022    BUN 72 (H) 04/19/2022    BUN 95 (H) 04/19/2022     04/20/2022     (L) 04/19/2022     (L) 04/19/2022    CO2 26 04/20/2022    CO2 25 04/19/2022    CO2 20 (L) 04/19/2022     Lab Results   Component Value Date    WBC 7.1 04/20/2022    HGB 7.3 (L) 04/20/2022    HCT 21.2 (L) 04/20/2022     No components found for: CALCIUM, PHOSPHORUS, MAGNESIUM  No results found for: HDL  No results found for: SPECIMENTYP, TURBIDITY, UGLU    Imaging Reveiwed:    KUB x ray - reviewed     CT abdomen:     1. Cystic/hemorrhagic lesion in the left adnexa, potentially hemorrhagic ovarian  cyst. Please note that pelvic/transvaginal ultrasound is most sensitive to  evaluate the gynecologic structures, including evaluation for ovarian cyst or  torsion if clinically suspected.     2. Moderate amount of intraperitoneal free fluid and small amount of  intraperitoneal free air, which can simply be related to known peritoneal  dialysis.          Cristian Andrews MD, MD [facility]

## 2022-04-20 NOTE — PROGRESS NOTES
Care Management    Reason for Admission: acute bleeding    CM notes surgery cancelled/postponed today pending trending of H/H labs    Chart reviewed. Per H&P: Juan M Elizabeth is a 32 y.o. female with PMHX of CKD (peritoneal dialysis) who presents to the emergency department by EMS C/O blood in peritoneal dialysis port onset today. Associated sxs include mild abdominal pain, constipation. Pt denies fever, chills or any other sxs or complaints. Patient was sent in by EMS from dialysis. She normally gets peritoneal dialysis. She had her port changed at [facility] on Friday (5 days ago).    Prior to admission patient was living: with her mother    Prior to admission patient was using the following DME:  Used a cane occasionally                  RUR Score:      21%    COVID Vaccine Status:              PCP: First and Last name: Leo Nichols MD    Name of Practice:    Are you a current patient: Yes/No: yes   Approximate date of last visit:    Can you participate in a virtual visit if needed:     Do you (patient/family) have any concerns for transition/discharge? Wants to know how to arrange transport to dialysis if she switched to HD              Plan for utilizing home health:  [facility]     Current Advanced Directive/Advance Care Plan: Full Code     Healthcare Decision Maker:   Primary Decision Maker: Melia Perkins - Mother - [phone]  Click here to complete [employer] including selection of the Healthcare Decision Maker Relationship (ie \"Primary\")            Transition of Care Plan:  CM spoke with patient at bedside, patient confirmed that she lives with her mother Cindy Dyson who is also her primary decision maker. The patient verified her PCP and that she uses [employer] for her PD. The patient states that she is normally independent but recently due to pain in her back has been using a cane to help ambulate.  The patient denies using home O2 and denies using a CPAP. The patient stated that the physician has spoken to her about the option of changing from PD to HD at a facility, patient had questions about transportation to the facility should she switch to HD. CMs called [facility] to get contact info for the patient's , Suresh, [phone]. CM spoke with Ms. Doyle to notify her patient would be calling with concerns about transport.  stated she would start the process of opening an account to [facility] should the patient need it. Readmission Assessment  Number of days since last admission?: 1-7 days  Previous disposition: Home with Family  Who is being interviewed?: Patient  What was the patient's/caregiver's perception as to why they think they needed to return back to the hospital?:  (she had bleeding that hadn't stopped)  Did you visit your Primary Care Physician after you left the hospital, before you returned this time?: No  Why weren't you able to visit your PCP?: Other (Comment) (too soon, she discharged Friday 4/15/2022)  Did you see a specialist, such as Cardiac, Pulmonary, Orthopedic Physician, etc. after you left the hospital?: No (too soon, discharge Friday 4/15/2022)  Who advised the patient to return to the hospital?: Other (Comment) (patient's dialysis unit)  Does the patient report anything that got in the way of taking their medications?: No  In our efforts to provide the best possible care to you and others like you, can you think of anything that we could have done to help you after you left the hospital the first time, so that you might not have needed to return so soon?: Other (Comment) (no, nothing could have been different)      Care Management Interventions  PCP Verified by CM:  Yes  Last Visit to PCP: 04/06/22  Transition of Care Consult (CM Consult): Discharge Planning  Support Systems: Parent(s)  Confirm Follow Up Transport: Family  Discharge Location  Patient Expects to be Discharged to[de-identified] (anticipate home with physician follow up)

## 2022-04-20 NOTE — CONSULTS
Consult    Patient: James Raines MRN: [id number]  SSN: [id number]    YOB: 1995  Age: 32 y.o. Sex: female      Subjective:      James Raines is a 32 y.o. female who is being seen for admission to icu for anemia and lupus and crf, of note pt has had llq discomfort for 6 mo and ct scan on admision and ultrasound confirmed a left hemmorraghic corpus luteal cyst 5-6 cm. Of note pt is a G0 and in a same sex relationship. Has never had a gyn exam and her menses for the past yr have been heavy passing clots lasting 3 days a month. Pt has h/o thrombocytopenia and anemia with her lupus and renal failure. Pt has received 3 units prbc, platelets and plasma and is feeling much improved. Just started menses today.     Past Medical History:   Diagnosis Date    A-V fistula (HCC)     LEFT SIDE     Anxiety and depression     Asthma     Chronic kidney disease     ESRD- fresinius    Chronic pain     GERD (gastroesophageal reflux disease)     History of blood transfusion 2017, 2020    Hypertension     Lupus ([address]     Psychiatric disorder     Thyroid disease     hypo     Past Surgical History:   Procedure Laterality Date    HX HEENT  2021    oral surgery    HX UROLOGICAL  98675, 2019    biopsies of kidneys    HX VASCULAR ACCESS Left 2020    dialysis shunt (arm)    IR INSERT INTRAPERITONEAL CATH PERM      VA ABDOMEN SURGERY PROC UNLISTED  2015    Drain a boil      Family History   Problem Relation Age of Onset    Kidney Disease Father     Lupus Father      Social History     Tobacco Use    Smoking status: Current Every Day Smoker    Smokeless tobacco: Never Used    Tobacco comment: 5 Black and milds daily d/t recent stress    Substance Use Topics    Alcohol use: Not Currently     Comment: ocassionally      Current Facility-Administered Medications   Medication Dose Route Frequency Provider Last Rate Last Admin    0.9% sodium chloride infusion 250 mL  250 mL IntraVENous PRN [facility] LU        metoprolol (LOPRESSOR) injection 2.5 mg  2.5 mg IntraVENous Q6H PRN Joycelyn Cazares MD   2.5 mg at 04/20/22 1144    amLODIPine (NORVASC) tablet 10 mg  10 mg Oral PCL Washington Montesinos MD   10 mg at 04/20/22 1318    calcium carbonate (OS-VIRGILIO) tablet 1,500 mg [elemental]  1,500 mg Oral AC&HS Washington Montesinos MD        [START ON 4/21/2022] escitalopram oxalate (LEXAPRO) tablet 10 mg  10 mg Oral DAILY Washington Montesinos MD        ferrous sulfate tablet 325 mg  1 Tablet Oral BID WITH MEALS Washington Montesinos MD        sevelamer carbonate (RENVELA) tab 800 mg  800 mg Oral TID WITH MEALS Washington Montesinos MD        labetaloL (NORMODYNE) tablet 200 mg  200 mg Oral TID Washington Montesinos MD   200 mg at 04/20/22 1547    [START ON 4/21/2022] levothyroxine (SYNTHROID) tablet 100 mcg  100 mcg Oral ACB Washington Montesinos MD        hydrOXYchloroQUINE (PLAQUENIL) tablet 200 mg  200 mg Oral QHS Washington Montesinos MD        divalproex DR (DEPAKOTE) tablet 500 mg  500 mg Oral BID Washington Montesinos MD        albuterol (PROVENTIL HFA, VENTOLIN HFA, PROAIR HFA) inhaler 2 Puff  2 Puff Inhalation Q4H PRN Washington Montesinos MD        Maylin Levels ON 4/21/2022] calcitRIOL (ROCALTROL) capsule 1.5 mcg  1.5 mcg Oral DAILY Shankar Flores MD        hydrALAZINE (APRESOLINE) 20 mg/mL injection 20 mg  20 mg IntraVENous Q6H PRN Joycelyn Cazares MD        niCARdipine (CARDENE) 25 mg in 0.9% sodium chloride 250 mL infusion  0-15 mg/hr IntraVENous TITRATE Joycelyn Cazares MD        medroxyPROGESTERone (DEPO-PROVERA) 150 mg/mL injection 150 mg  150 mg IntraMUSCular Kamaljit Guardado MD        0.9% sodium chloride infusion 250 mL  250 mL IntraVENous PRN Diony Sewell MD        bumetanide (BUMEX) injection 2 mg  2 mg IntraVENous Q12H Shankar Flores MD   2 mg at 04/20/22 0831    piperacillin-tazobactam (ZOSYN) 3.375 g in 0.9% sodium chloride (MBP/ADV) 100 mL MBP  3.375 g IntraVENous Q12H Joycelyn Cazares MD 25 mL/hr at 04/20/22 1547 3.375 g at 04/20/22 1547    0.9% sodium chloride infusion 250 mL  250 mL IntraVENous PRN Pat Quintana MD        sodium chloride (NS) flush 5-40 mL  5-40 mL IntraVENous Q8H Liza Paulino MD   10 mL at 04/20/22 1400    sodium chloride (NS) flush 5-40 mL  5-40 mL IntraVENous PRN Liza Paulino MD        acetaminophen (TYLENOL) tablet 650 mg  650 mg Oral Q6H PRN Liza Paulino MD   650 mg at 04/19/22 1846    Or    acetaminophen (TYLENOL) suppository 650 mg  650 mg Rectal Q6H PRN Liza Paulino MD        polyethylene glycol (MIRALAX) packet 17 g  17 g Oral DAILY PRN Liza Paulino MD        ondansetron (ZOFRAN ODT) tablet 4 mg  4 mg Oral Q8H PRN Liza Paulino MD   4 mg at 04/19/22 1846    Or    ondansetron (ZOFRAN) injection 4 mg  4 mg IntraVENous Q6H PRN Liza Paulino MD   4 mg at 04/20/22 0844    famotidine (PEPCID) tablet 20 mg  20 mg Oral DAILY Johnny Paulino MD   20 mg at 04/20/22 9457    0.9% sodium chloride infusion 250 mL  250 mL IntraVENous PRN Aneesh Valverde MD        oxyCODONE IR (ROXICODONE) tablet 10 mg  10 mg Oral Q6H PRN Aneesh Valverde MD   10 mg at 04/20/22 6278        Allergies   Allergen Reactions    Banana Itching and Swelling    Clindamycin Diarrhea    Sulfa (Sulfonamide Antibiotics) Hives     Bactrim       Review of Systems:  A comprehensive review of systems was negative except for that written in the History of Present Illness. Objective:     Vitals:    04/20/22 1600 04/20/22 1615 04/20/22 1630 04/20/22 1700   BP: (!) 177/114  (!) 162/90 (!) 156/91   Pulse: 91 87 90 89   Resp: 11 9 14 17   Temp:       TempSrc:       SpO2: 100% 100% 100% 100%   Weight:            Physical Exam:  ABDOMEN: soft, non-tender.  Bowel sounds normal. No masses,  no organomegaly, normal findings:     Lupus and chronic renal failure with anemia and thromocytopenia with menorraghia and hemm corpus luteal cyst. Assessment:these cl cysts are normal and did not cause her low hbg but can cause chronic pain. They ususally resolve on their own over a few months but can be helped along with resolution with hormonal regimens. I think this pt is a  Michelle Onesimo candidate for depoprovera. it will hopefuly shut down her menses and control her menorraghia in a situation where she is already anemic and thrombocytopenic. After discussion with pt, will proceed with injection , pt does need f/u after admission for full gyn exam and pap to clear her as well for her transplant. She tells me she is on a list. We will repeat her ultrasound in a month to relook at her left adnexal cyst in office     Hospital Problems  Date Reviewed: 4/14/2022          Codes Class Noted POA    Anemia ICD-10-CM: D64.9  ICD-9-CM: 285.9  4/19/2022 Yes        Peritoneal hematoma ICD-10-CM: R89.77EJ  ICD-9-CM: 868.00  4/19/2022 Yes        Peritoneal dialysis catheter dysfunction ([facility] [address] ICD-10-CM: B71.776Q  ICD-9-CM: 996.56  4/19/2022 Yes        * (Principal) Acute bleeding ICD-10-CM: R58  ICD-9-CM: 459.0  4/19/2022 Yes        Uncontrolled hypertension ICD-10-CM: I10  ICD-9-CM: 401.9  4/19/2022 Unknown        Lupus nephritis ([facility] [address] ICD-10-CM: M32.14  ICD-9-CM: 710.0, 583.81  4/19/2022 Unknown        Hemoperitoneum ICD-10-CM: K66.1  ICD-9-CM: 568.81  4/19/2022 Yes        Abnormal pelvic ultrasound ICD-10-CM: R93.89  ICD-9-CM: 793.5  4/19/2022 Yes              Plan: Will plan depoprovera today since just started menses and should f/u after discharge to my office.  Office # [phone]    Signed By: Daniel Reid MD     April 20, 2022

## 2022-04-20 NOTE — DIALYSIS
TREATMENT SUMMARY   Patient dialyzed in room ICU 6  Tolerated treatment with one episode of vomiting. Patient states abdominal pain prior to onset of HD. Pt claims relief of pain and n/v and return of appetite after vomiting. LUE AVF venous cannulation site infiltrated x1 approximately two hours into HD related to LUE excessive use/movement. Patient re cannulated and HD restarted. Mild tenderness and swelling at infiltration site. -350    2500 ml removed via UF with a with a net removal 2000 ml  Report given to Junior Alvarez RN with all questions answered. TREATMENT  NOTES   2145 patient states paper tape allergy and request plastic tape to be used on AVF.                                                                                                     ACUTE HEMODIALYSIS FLOW SHEET       ACCESS   CATHETER ACCESS: [] N/A  [] RIGHT  [] LEFT  [] IJ  [] SUBCL [] FEM                    [] First use X-ray  [] Tunnel     [] Non-Tunneled      [] No S/S infection  [] Redness [] Drainage  [] Cultured [] Swelling [] Pain                    [] Medical Aseptic [] Prep Dressing Changed                  [] Clotted [] Patent []      Flows: [] Good [] Poor [] Reversed                 If Access Problem Dr. David Oquendo: [] Yes [] No    Date:_____  [] N/A[]   GRAFT/FISTULA ACCESS:  [] N/A  [] RIGHT  [x] LEFT  [x] UE   [] LE       [] AVG  [x] AVF [] BUTTONHOLE    [x] +BRUIT/THRILL [] MEDICAL ASEPTIC PREP     [x] No S/S infection  [] Redness [] Drainage  [] Cultured [] Swelling [] Pain              If Access Problem Dr. David Oquendo: [] Yes [] No    Date:______ [] N/A     RO/HEMODIAYLSIS MACHINE SAFETY CHECKS- BEFORE EACH TREATMENT                         [x] THE [facility]: Machine Serial #  [id number]   RO Serial [id number]      Alarm Test: [x] Pass  Time___1750_____  [x] RO/Machine Log Complete    [x] Extracorporeal circuit Tested for integrity           Dialyzer_[id number]_________   Tubing__21J02-12__    Dialysate: pH__7.4_ Temp.__37___Conductivity: Meter __14__ HD Machine__13.8_   CHLORINE TESTING- BEFORE EACH TREATMENT AND EVERY 4 HOURS   Total Chlorine: [x] Less than 0.1 ppm Time:__1745___2nd Check Time:______  (If greater than 0.1 ppm from Primary then every 30 minutes from Secondary)   TREATMENT INIATION-WITH DIALYSIS PRECAUTIONS   [x] All Connections Secured   [x] Saline Line Double Clamped    [x] Venous Parameters Set [x] Arterial Parameters Set    [x] Prime Given 250 ml     [x] Air Foam Detector Engaged   PRE-TREATMENT   UF Calculations: Wt to lose:_2000_ml(+) Oral:_0_ml(+)IV Meds/Fluids/Blood prods_0__ml(+) Prime/Rinse__500_ml(=)Total UF Goal_2500___mL     Tx Initiation Note: RECEIVED REPORT. PATIENT ALERT AND ORIENTED. VITAL SIGNS WNL. NAD. ALL QUESTIONS ANSWERED WITH PATIENT  SAFETY CHECKS COMPLETE. TIME OUT COMPLETE. TREATMENT INITIATED VIA ___LUE AVF__. NO CONCERNS NOTED. [x] Time Out/Safety Check  Time:__1755___     Dialyzer cleared: [x] Good [] Fair [] Poor     Blood Volume Processed __53.4__L   Net UF Removed _2000___mL  Post Tx Access:                  AVF/AVG: Bleeding Stop Time      Art. 6_min Josue.__6_min [x]+bruit/thrill                              Catheter: Locking Solution  [] Heparin 1 ml/1000 units                                                             [] Normal Saline                                                                      Art._____ ml Josue._____ml                                                           [x] New caps placed       Abbreviations: AVG-arterial venous graft, AVF-arterial venous fistula, IJ-Internal Jugular,  Subcl-Subclavian, Fem-Femoral, Tx-treatment, AP/HR-apical heart rate, DFR-dialysate flow rate, BFR-blood flow rate, AP-arterial pressure, -venous pressure, UF-ultrafiltrate, TMP-transmembrane pressure, Josue-Venous, Art-Arterial, RO-Reverse Osmosis

## 2022-04-20 NOTE — PROGRESS NOTES
Plan to go to OR possibly today  I called and consulted ob/gyn Dr. Marguerite Phoenix will see and considering evaluation of the ovary as well at the same time  A. Baron Severance Md

## 2022-04-20 NOTE — PROGRESS NOTES
Assumed care of pt from Kate Curry. Pt received 3 blood transfusions FFP and Platelets. 1028: fluid drawn from PD catheter. Fluid drawn was blood. Fluid sent to lab per Vero Lewis. OR called pt to go to surgery 1230. Pt abdomen distended and tender. Pt also started her period and passed 2 large clots. Pt stated that was normal for her period to pass clots. 1119: Spoke with  regarding surgery cancellation for clarification. Dr. Thang Crawford stated he wanted to wait and monitor blood count before proceeding with surgery. Dr. Lissett Wiseman will come and see patient as well. 1125: Pt made aware of surgery cancellation. 1207: Pt recieved lopressor for B/P that was elevated. 1315: Pt given oral B/P medication for elevated B/P.  1350: pt used bathroom passed large clot from vagina. Pt states that happens every time she gets her cycle. Will pass on to OBGYN when they come. 1405: Pt B/P cuff on leg due to AV fistula and midlines. Pt was given Norvasc will wait for next recycle on B/P. Dr. Pham Breath aware. 1430: cycled pressure again a little better will continue to monitor and treat. 1453: IV B/P medication given will reassess B/P.  1556: Spoke with  regarding elevated blood pressure and need for possible dialysis and  recommendation for Gerard gtt.  gave verbal order for labetalol and wait one hour if no improvement start Gerard gtt. 1639: pt b/P improved slightly. Spoke with  regarding allowing pt to eat. Received order for pt to eat.

## 2022-04-20 NOTE — ROUTINE PROCESS
Bedside shift change report given to Sudhakar Ji RN (oncoming nurse) by Mu Gallo RN (offgoing nurse). Report included the following information SBAR, Kardex, Intake/Output and MAR.

## 2022-04-20 NOTE — PROGRESS NOTES
[facility]  Pulmonary, Critical Care, and Sleep Medicine    Name: Marylou Springer MRN: [id number]   : 1995 Hospital: [facility] [place]    Date: 2022  Room: [facility]/[address] [place] [address] Note                                              Consult requesting physician: Hospitalist    Reason for Consult: acute bleeding PD catheter malfunction, renal failure , severe uremia , acute anemia      IMPRESSION:   · Acute bleeding  · Severe anemia  · End-stage renal disease  · Peritoneal dialysis malfunction  · Hyponatremia   · Hyperkalemia  ·   Patient Active Problem List   Diagnosis Code    Encephalopathy G93.40    Lupus ([address] M32.9    ESRD on dialysis ([address] N18.6, Z99.2    GERD (gastroesophageal reflux disease) K21.9    Hypertension I10    Thyroid disease E07.9    Psychiatric disorder F99    Anemia D64.9    Peritoneal hematoma S36.81XA    Peritoneal dialysis catheter dysfunction ([address] T85.611A    Acute bleeding R58    Uncontrolled hypertension I10    Lupus nephritis ([facility] M32.14           · Code status:  Full code Code      RECOMMENDATIONS:   Respiratory: Stable , history of asthma resume in hospital no wheezing   Currently  on RA  Protecting airway. Keep SPO2 >=92%. HOB 30 degree elevation all the time. Aggressive pulmonary toileting. Aspiration precautions. Incentive spirometry. CVS: Hypertension, ESRD missed HD will resume home medication and HD last night tolerated well. ID: PD placed no fever but bleeding high risk abdominal sepsis zosyn   Body fluid send for culure still bloody  Sepsis protocol  Sepsis bundle and protocol followed. Follow serial lactic acid, frequent BMP check, fluid resuscitation. Follow cultures. Deescalate antibiotic when appropriate.   Hematology/Oncology: acute on chronic anemia retroperitoneal bleeding   Hb 4.9 got 3 units PRBC and FFP and Vit K  Hb still low <7 follow up hb pending  PRBC stat  H/h stat every 6 hrs   INR wildly elevated give vit k and FFP, check fibrinogen   Renal: ESRD due to lupus since 2021  Was on HD AV fistual wanted PD second PD cath  Urgent HD today due to severe uremia, fluid overload   GI/:   Endocrine: Monitor BS. SSI. Neurology: awake and alert    Pain/Sedation: prn  Skin/Wound:post op wound PD   Electrolytes: Replace electrolytes per ICU electrolyte replacement protocol. Per renal   IVF: PRBC avoid IVF  Nutrition: npo until surgery cleared   Prophylaxis: DVT Prophylaxis: SCD/. GI Prophylaxis: Protonix/yes. Restraints: none    Lines/Tubes: PIV  Other:  Gyn hemorragic ? Left ovary ? Maybe a cause of pain I called OB/gyn today and spoke hope duo surgery both gyn/surgery    OTHER: Moderate intraperitoneal free fluid and small amount of intraperitoneal  free air, likely related to peritoneal dialysis, with PD cannula in place coiled  in the right pelvis.      BONES: No acute or aggressive osseous abnormalities identified. _______________     IMPRESSION     1. Cystic/hemorrhagic lesion in the left adnexa, potentially hemorrhagic ovarian  cyst. Please note that pelvic/transvaginal ultrasound is most sensitive to  evaluate the gynecologic structures, including evaluation for ovarian cyst or  torsion if clinically suspected.     2. Moderate amount of intraperitoneal free fluid and small amount of  intraperitoneal free air, which can simply be related to known peritoneal  dialysis. Will defer respective systems problem management to primary and other respective consultant and follow patient in ICU with primary and other medical team.  Further recommendations will be based on the patient's response to recommended treatment and results of the investigation ordered. Quality Care: PPI, DVT prophylaxis, HOB elevated, Infection control all reviewed and addressed. Care of plan d/w hospitalist team, RN, RT, MDR.  D/w patient and hospitalist (answered all questions to satisfaction).      High complexity decision making was performed during the evaluation of this patient at high risk for decompensation with multiple organ involvement. Total critical care time spent rendering care exclusive of procedures/family discussion/coordination of care: 59 minutes. Subjective/History of Present Illness:     Patient is a 32 y.o. female with PMHx significant for SLE, asthma,  end-stage renal disease on peritoneal dialysis, still has fully functional left arm fistula, since last year on the PD, new peritoneal catheter placed last Friday, abdominal pain, weakness since then the procedure. Found to have retroperitoneal bleed hematoma. Patient also has not had dialysis since 4/14 significant uremia. Critical hemoglobin on admission. CT of abdomen and pelvis personally reviewed. Nephrology and general surgery on board. Transfusion to keep hemoglobin above 7. Surgery possibly today or tomorrow as per surgical team.  Critical hb 4.9  additionally left ovarian abnormalities for farther investigation of possible bleeding/tortion. 4/20/2022:  Evaluated in ICU bed 6. Awake and alert. Complaining of abdominal pain. Hb still low got 3 PRBC and FFP vit k  Coagulopathy resolved  Had HD last night  hemodynamically stable  Possible OR today I spoke to both teams surgery and gyn    I/O last 24 hrs: Intake/Output Summary (Last 24 hours) at 4/20/2022 1032  Last data filed at 4/20/2022 3043  Gross per 24 hour   Intake 1571.3 ml   Output 3250 ml   Net -1678.7 ml           History taken from patient and EMR    Review of Systems:  A comprehensive review of systems was negative except for that written in the HPI.        Review of Systems:   HEENT: No epistaxis, no nasal drainage, no difficulty in swallowing, no redness in eyes  Respiratory: as above  Cardiovascular: no chest pain, no palpitations, no chronic leg edema, no syncope  Gastrointestinal: yes  abd pain, no vomiting, no diarrhea, no bleeding symptoms  Genitourinary: No urinary symptoms or hematuria  Musculoskeletal: Neg  Neurological: No focal weakness, no seizures, no headaches  Behvioral/Psych: No anxiety, no depression  General : No fever, no chills, no weight loss, no night sweats     Allergies   Allergen Reactions    Banana Itching and Swelling    Clindamycin Diarrhea    Sulfa (Sulfonamide Antibiotics) Hives     Bactrim      Past Medical History:   Diagnosis Date    A-V fistula (HCC)     LEFT SIDE     Anxiety and depression     Asthma     Chronic kidney disease     ESRD- fresinius    Chronic pain     GERD (gastroesophageal reflux disease)     History of blood transfusion 2017, 2020    Hypertension     Lupus ([address]     Psychiatric disorder     Thyroid disease     hypo      Past Surgical History:   Procedure Laterality Date    HX HEENT  2021    oral surgery    HX UROLOGICAL  14294, 2019    biopsies of kidneys    HX VASCULAR ACCESS Left 2020    dialysis shunt (arm)    IR INSERT INTRAPERITONEAL CATH PERM      FL ABDOMEN SURGERY PROC UNLISTED  2015    Drain a boil      Social History     Tobacco Use    Smoking status: Current Every Day Smoker    Smokeless tobacco: Never Used    Tobacco comment: 5 Black and milds daily d/t recent stress    Substance Use Topics    Alcohol use: Not Currently     Comment: ocassionally      Family History   Problem Relation Age of Onset    Kidney Disease Father     Lupus Father       Prior to Admission medications    Medication Sig Start Date End Date Taking? Authorizing Provider   ondansetron (ZOFRAN ODT) 8 mg disintegrating tablet Take 1 Tablet by mouth every eight (8) hours as needed for Nausea or Vomiting. Indications: prevent nausea and vomiting after surgery 4/15/22   Lonny Hennessy MD   calcitRIOL (ROCALTROL) 0.5 mcg capsule Take 3 Capsules by mouth daily for 30 days. Patient taking differently: Take 1.5 mcg by mouth two (2) times a day.  3/24/22 4/23/22  Nico Peralta MD   amLODIPine (NORVASC) 10 mg tablet Take 1 Tablet by mouth daily (after lunch). Indications: high blood pressure 3/24/22   Steven Drummond MD   bumetanide (BUMEX) 2 mg tablet Take 1 Tablet by mouth daily. 3/24/22   Steven Drummond MD   sevelamer carbonate (RENVELA) 800 mg tab tab Take 1 Tablet by mouth three (3) times daily (with meals) for 30 days. 3/24/22 4/23/22  Steven Drummond MD   sodium bicarbonate 650 mg tablet Take 1 Tablet by mouth two (2) times a day for 30 days. 3/24/22 4/23/22  Steven Drummond MD   calcium carbonate (OS-VIRGILIO) 500 mg calcium (1,250 mg) tablet Take 3 Tablets by mouth Before breakfast, lunch, dinner and at bedtime. 2/22/22   Provider, Historical   ondansetron (ZOFRAN ODT) 8 mg disintegrating tablet Take 1 Tablet by mouth every eight (8) hours as needed for Nausea or Vomiting. Indications: prevent nausea and vomiting after surgery 6/3/21   Charline Simpson MD   albuterol (PROVENTIL HFA, VENTOLIN HFA, PROAIR HFA) 90 mcg/actuation inhaler Take  by inhalation as needed for Wheezing. Provider, Historical   budesonide (PULMICORT) 0.25 mg/2 mL nbsp by Nebulization route daily (after lunch). Provider, Historical   cetirizine (ZYRTEC) 10 mg tablet Take  by mouth daily (after lunch). Provider, Historical   divalproex ER (Depakote ER) 500 mg ER tablet Take 500 mg by mouth two (2) times a day. Indications: bipolar disorder in remission    Provider, Historical   escitalopram oxalate (Lexapro) 10 mg tablet Take 10 mg by mouth daily. Provider, Historical   famotidine (PEPCID) 20 mg tablet Take 20 mg by mouth nightly. Provider, Historical   ferrous sulfate 324 mg (65 mg iron) tablet Take  by mouth two (2) times daily (with meals). Provider, Historical   fluticasone propion-salmeteroL (Advair Diskus) 250-50 mcg/dose diskus inhaler Take 1 Puff by inhalation every twelve (12) hours. Provider, Historical   fluticasone propionate (FLONASE NA) by Nasal route as needed. Provider, Historical   hydrOXYchloroQUINE (Plaquenil) 200 mg tablet Take 200 mg by mouth nightly.  Indications: systemic lupus erythematosus, an autoimmune disease, Lupus    Provider, Historical   labetaloL (NORMODYNE) 200 mg tablet Take 200 mg by mouth three (3) times daily. Indications: high blood pressure    Provider, Historical   levothyroxine (SYNTHROID) 100 mcg tablet Take 100 mcg by mouth Daily (before breakfast). Provider, Historical   pantoprazole (PROTONIX) 40 mg tablet Take 40 mg by mouth two (2) times a day. Provider, Historical     Current Facility-Administered Medications   Medication Dose Route Frequency    bumetanide (BUMEX) injection 2 mg  2 mg IntraVENous Q12H    piperacillin-tazobactam (ZOSYN) 3.375 g in 0.9% sodium chloride (MBP/ADV) 100 mL MBP  3.375 g IntraVENous Q12H    sodium chloride (NS) flush 5-40 mL  5-40 mL IntraVENous Q8H    famotidine (PEPCID) tablet 20 mg  20 mg Oral DAILY         Objective:   Vital Signs:    Visit Vitals  BP (!) 160/99   Pulse 88   Temp 98 °F (36.7 °C)   Resp 16   Wt 78.9 kg (174 lb)   LMP 2022 (Approximate)   SpO2 100%   BMI 28.08 kg/m²       O2 Device: None (Room air)       Temp (24hrs), Av.2 °F (36.8 °C), Min:97.7 °F (36.5 °C), Max:98.9 °F (37.2 °C)       Intake/Output:   Last shift:       0701 -  1900  In: 321.3   Out: -     Last 3 shifts:  1901 -  0700  In: 1250   Out: 3250 [Urine:1250]      Intake/Output Summary (Last 24 hours) at 2022 1032  Last data filed at 2022 0727  Gross per 24 hour   Intake 1571.3 ml   Output 3250 ml   Net -1678.7 ml       Last 3 Recorded Weights in this Encounter    22 1147   Weight: 78.9 kg (174 lb)             No results for input(s): PHI, PHI, POC2, PCO2I, PO2, PO2I, HCO3, HCO3I, FIO2, FIO2I in the last 72 hours. Physical Exam:     Impresson general : Alert, Awake, in moderate distress abdominal dyscontrol   Head:   Normocephalic,atraumatic. ENT:   EOM  no scleral icterus, no pallor, no cyanosis.   Nose:   No sinus tenderness  Throat:  Oropharynx ,mucosa, and tongue normal. No oral thrush. Neck:   Supple, symmetric. Lymph nodes. Trachea is midline  Lung: Moderate air entry bilateral equal No rales. No rhonchi. No wheezing. No stridors. No prolongded expiration. No accessory muscle use. Heart:   Regular  rate & rhythm. S1 S2 present. No murmur. No JVD. Abdomen:  Distended /PD cath right upper quadrant with small drainage  , ascites, decresed BS. No masses. liver and spleen  Extremities:  2 plus pedal edema. No cyanosis. No clubbing. Pulses: 2+ and symmetric in DP. Capillary refill: normal  Lymphatic:  neck and supraclavicular    Musculoskeletal: No joint swelling. No tenderness. Skin:   Lesion Color, texture, turgor normal. No rashes or lesions.        Data:       Recent Results (from the past 24 hour(s))   EKG, 12 LEAD, INITIAL    Collection Time: 04/19/22 11:41 AM   Result Value Ref Range    Ventricular Rate 82 BPM    Atrial Rate 82 BPM    P-R Interval 172 ms    QRS Duration 88 ms    Q-T Interval 398 ms    QTC Calculation (Bezet) 464 ms    Calculated P Axis 70 degrees    Calculated R Axis 61 degrees    Calculated T Axis 56 degrees    Diagnosis       Normal sinus rhythm  Possible Left atrial enlargement  Borderline ECG  When compared with ECG of 23-MAR-2022 15:17,  QT has shortened     URINALYSIS W/ RFLX MICROSCOPIC    Collection Time: 04/19/22 11:52 AM   Result Value Ref Range    Color YELLOW      Appearance CLOUDY      Specific gravity 1.007 1.005 - 1.030      pH (UA) 6.5 5.0 - 8.0      Protein 100 (A) NEG mg/dL    Glucose Negative NEG mg/dL    Ketone Negative NEG mg/dL    Bilirubin Negative NEG      Blood LARGE (A) NEG      Urobilinogen 0.2 0.2 - 1.0 EU/dL    Nitrites Negative NEG      Leukocyte Esterase MODERATE (A) NEG     URINE MICROSCOPIC ONLY    Collection Time: 04/19/22 11:52 AM   Result Value Ref Range    WBC 11 to 20 0 - 5 /hpf    RBC 11 to 20 0 - 5 /hpf    Epithelial cells 3+ 0 - 5 /lpf    Bacteria 2+ (A) NEG /hpf   CBC WITH AUTOMATED DIFF    Collection Time: 04/19/22 12:25 PM   Result Value Ref Range    WBC 8.4 4.6 - 13.2 K/uL    RBC 1.86 (L) 4.20 - 5.30 M/uL    HGB 4.9 (LL) 12.0 - 16.0 g/dL    HCT 15.5 (LL) 35.0 - 45.0 %    MCV 83.3 78.0 - 100.0 FL    MCH 26.3 24.0 - 34.0 PG    MCHC 31.6 31.0 - 37.0 g/dL    RDW 14.6 (H) 11.6 - 14.5 %    PLATELET 007 (L) 912 - 420 K/uL    NRBC 0.0 0  WBC    ABSOLUTE NRBC 0.00 0.00 - 0.01 K/uL    NEUTROPHILS 92 (H) 40 - 73 %    LYMPHOCYTES 3 (L) 21 - 52 %    MONOCYTES 5 3 - 10 %    EOSINOPHILS 0 0 - 5 %    BASOPHILS 0 0 - 2 %    IMMATURE GRANULOCYTES 0 %    ABS. NEUTROPHILS 7.7 1.8 - 8.0 K/UL    ABS. LYMPHOCYTES 0.3 (L) 0.9 - 3.6 K/UL    ABS. MONOCYTES 0.4 0.05 - 1.2 K/UL    ABS. EOSINOPHILS 0.0 0.0 - 0.4 K/UL    ABS. BASOPHILS 0.0 0.0 - 0.1 K/UL    ABS. IMM.  GRANS. 0.0 K/UL    DF MANUAL      PLATELET COMMENTS DECREASED PLATELETS      RBC COMMENTS HYPOCHROMIA  1+        RBC COMMENTS POLYCHROMASIA  SLIGHT  TARGET CELLS  1+        RBC COMMENTS OVALOCYTES  3+       TYPE & SCREEN    Collection Time: 04/19/22 12:25 PM   Result Value Ref Range    Crossmatch Expiration 04/22/2022,2359     ABO/Rh(D) O POSITIVE     Antibody screen NEG     Unit number [id number]     Blood component type RC LR     Unit division 00     Status of unit ISSUED     Crossmatch result Compatible     Unit number [id number]     Blood component type RC LR,1     Unit division 00     Status of unit ISSUED     Crossmatch result Compatible     Unit number [id number]     Blood component type RC LR,2     Unit division 00     Status of unit ISSUED     Crossmatch result Compatible    PROTHROMBIN TIME + INR    Collection Time: 04/19/22 12:25 PM   Result Value Ref Range    Prothrombin time 16.6 (H) 11.5 - 15.2 sec    INR 1.4 (H) 0.8 - 1.2     PTT    Collection Time: 04/19/22 12:25 PM   Result Value Ref Range    aPTT 52.4 (H) 23.0 - 36.4 SEC   LIPASE    Collection Time: 04/19/22 12:25 PM   Result Value Ref Range    Lipase 38 (L) 73 - 393 U/L   CULTURE, BLOOD    Collection Time: 04/19/22 12:25 PM    Specimen: Blood   Result Value Ref Range    Special Requests: NO SPECIAL REQUESTS      Culture result: NO GROWTH AFTER 20 HOURS     LACTIC ACID    Collection Time: 04/19/22 12:25 PM   Result Value Ref Range    Lactic acid 0.5 0.4 - 2.0 MMOL/L   METABOLIC PANEL, COMPREHENSIVE    Collection Time: 04/19/22 12:25 PM   Result Value Ref Range    Sodium 130 (L) 136 - 145 mmol/L    Potassium 5.2 3.5 - 5.5 mmol/L    Chloride 97 (L) 100 - 111 mmol/L    CO2 20 (L) 21 - 32 mmol/L    Anion gap 13 3.0 - 18 mmol/L    Glucose 85 74 - 99 mg/dL    BUN 95 (H) 7.0 - 18 MG/DL    Creatinine 20.30 (H) 0.6 - 1.3 MG/DL    BUN/Creatinine ratio 5 (L) 12 - 20      GFR est AA 3 (L) >60 ml/min/1.73m2    GFR est non-AA 2 (L) >60 ml/min/1.73m2    Calcium 7.2 (L) 8.5 - 10.1 MG/DL    Bilirubin, total 0.7 0.2 - 1.0 MG/DL    ALT (SGPT) <6 (L) 13 - 56 U/L    AST (SGOT) 11 10 - 38 U/L    Alk. phosphatase 64 45 - 117 U/L    Protein, total 5.7 (L) 6.4 - 8.2 g/dL    Albumin 2.0 (L) 3.4 - 5.0 g/dL    Globulin 3.7 2.0 - 4.0 g/dL    A-G Ratio 0.5 (L) 0.8 - 1.7     RBC, ALLOCATE    Collection Time: 04/19/22  1:00 PM   Result Value Ref Range    HISTORY CHECKED?  Historical check performed    CULTURE, BLOOD    Collection Time: 04/19/22  2:54 PM    Specimen: Blood   Result Value Ref Range    Special Requests: NO SPECIAL REQUESTS      Culture result: NO GROWTH AFTER 18 HOURS     PLASMA, ALLOCATE    Collection Time: 04/19/22  4:00 PM   Result Value Ref Range    Unit number [id number]     Blood component type FP 24h, Thaw     Unit division 00     Status of unit ISSUED     Unit number [id number]     Blood component type FP 24h,Thaw2     Unit division 00     Status of unit ALLOCATED    URINALYSIS W/MICROSCOPIC    Collection Time: 04/19/22  5:59 PM   Result Value Ref Range    Color YELLOW      Appearance CLEAR      Specific gravity 1.006 1.005 - 1.030      pH (UA) 7.0 5.0 - 8.0      Protein 100 (A) NEG mg/dL    Glucose Negative NEG mg/dL Ketone Negative NEG mg/dL    Bilirubin Negative NEG      Blood MODERATE (A) NEG      Urobilinogen 0.2 0.2 - 1.0 EU/dL    Nitrites Negative NEG      Leukocyte Esterase TRACE (A) NEG      WBC 0 to 3 0 - 5 /hpf    RBC 1 to 3 0 - 5 /hpf    Epithelial cells 2+ 0 - 5 /lpf    Bacteria FEW (A) NEG /hpf   PROCALCITONIN    Collection Time: 04/19/22  6:45 PM   Result Value Ref Range    Procalcitonin 6.43 ng/mL   CBC WITH AUTOMATED DIFF    Collection Time: 04/19/22  6:45 PM   Result Value Ref Range    WBC 5.8 4.6 - 13.2 K/uL    RBC 2.01 (L) 4.20 - 5.30 M/uL    HGB 5.5 (LL) 12.0 - 16.0 g/dL    HCT 16.8 (LL) 35.0 - 45.0 %    MCV 83.6 78.0 - 100.0 FL    MCH 27.4 24.0 - 34.0 PG    MCHC 32.7 31.0 - 37.0 g/dL    RDW 14.6 (H) 11.6 - 14.5 %    PLATELET 98 (L) 921 - 420 K/uL    NRBC 0.0 0  WBC    ABSOLUTE NRBC 0.00 0.00 - 0.01 K/uL    NEUTROPHILS 83 (H) 40 - 73 %    LYMPHOCYTES 6 (L) 21 - 52 %    MONOCYTES 8 3 - 10 %    EOSINOPHILS 2 0 - 5 %    BASOPHILS 0 0 - 2 %    IMMATURE GRANULOCYTES 1 (H) 0.0 - 0.5 %    ABS. NEUTROPHILS 4.8 1.8 - 8.0 K/UL    ABS. LYMPHOCYTES 0.3 (L) 0.9 - 3.6 K/UL    ABS. MONOCYTES 0.5 0.05 - 1.2 K/UL    ABS. EOSINOPHILS 0.1 0.0 - 0.4 K/UL    ABS. BASOPHILS 0.0 0.0 - 0.1 K/UL    ABS. IMM. GRANS. 0.1 (H) 0.00 - 0.04 K/UL    DF AUTOMATED     METABOLIC PANEL, COMPREHENSIVE    Collection Time: 04/19/22  6:45 PM   Result Value Ref Range    Sodium 132 (L) 136 - 145 mmol/L    Potassium 4.0 3.5 - 5.5 mmol/L    Chloride 101 100 - 111 mmol/L    CO2 25 21 - 32 mmol/L    Anion gap 6 3.0 - 18 mmol/L    Glucose 86 74 - 99 mg/dL    BUN 72 (H) 7.0 - 18 MG/DL    Creatinine 14.90 (H) 0.6 - 1.3 MG/DL    BUN/Creatinine ratio 5 (L) 12 - 20      GFR est AA 4 (L) >60 ml/min/1.73m2    GFR est non-AA 3 (L) >60 ml/min/1.73m2    Calcium 8.2 (L) 8.5 - 10.1 MG/DL    Bilirubin, total 0.5 0.2 - 1.0 MG/DL    ALT (SGPT) <6 (L) 13 - 56 U/L    AST (SGOT) 12 10 - 38 U/L    Alk.  phosphatase 59 45 - 117 U/L    Protein, total 5.9 (L) 6.4 - 8.2 g/dL    Albumin 1.9 (L) 3.4 - 5.0 g/dL    Globulin 4.0 2.0 - 4.0 g/dL    A-G Ratio 0.5 (L) 0.8 - 1.7     HEP B SURFACE AG    Collection Time: 04/19/22  6:45 PM   Result Value Ref Range    Hepatitis B surface Ag <0.10 <1.00 Index    Hep B surface Ag Interp. Negative NEG     HEP B SURFACE AB    Collection Time: 04/19/22  6:45 PM   Result Value Ref Range    Hepatitis B surface Ab 186.78 >10.0 mIU/mL    Hep B surface Ab Interp. Positive POS      Hep B surface Ab comment        Samples with a  value of 10 mIU/mL or greater are considered positive (protective immunity) in accordance with the CDC guidelines. RBC, ALLOCATE    Collection Time: 04/19/22  7:30 PM   Result Value Ref Range    HISTORY CHECKED? Historical check performed    PLATELETS, ALLOCATE    Collection Time: 04/19/22  7:30 PM   Result Value Ref Range    Unit number [id number]     Blood component type PLP,LR PSTC2     Unit division 00     Status of unit ISSUED    PROTHROMBIN TIME + INR    Collection Time: 04/20/22  2:59 AM   Result Value Ref Range    Prothrombin time 14.7 11.5 - 15.2 sec    INR 1.2 0.8 - 1.2     FIBRINOGEN    Collection Time: 04/20/22  2:59 AM   Result Value Ref Range    Fibrinogen 657 (H) 210 - 890 mg/dL   METABOLIC PANEL, COMPREHENSIVE    Collection Time: 04/20/22  2:59 AM   Result Value Ref Range    Sodium 138 136 - 145 mmol/L    Potassium 4.1 3.5 - 5.5 mmol/L    Chloride 105 100 - 111 mmol/L    CO2 26 21 - 32 mmol/L    Anion gap 7 3.0 - 18 mmol/L    Glucose 80 74 - 99 mg/dL    BUN 48 (H) 7.0 - 18 MG/DL    Creatinine 12.30 (H) 0.6 - 1.3 MG/DL    BUN/Creatinine ratio 4 (L) 12 - 20      GFR est AA 4 (L) >60 ml/min/1.73m2    GFR est non-AA 4 (L) >60 ml/min/1.73m2    Calcium 8.1 (L) 8.5 - 10.1 MG/DL    Bilirubin, total 0.5 0.2 - 1.0 MG/DL    ALT (SGPT) <6 (L) 13 - 56 U/L    AST (SGOT) 14 10 - 38 U/L    Alk.  phosphatase 62 45 - 117 U/L    Protein, total 5.4 (L) 6.4 - 8.2 g/dL    Albumin 2.0 (L) 3.4 - 5.0 g/dL    Globulin 3.4 2.0 - 4.0 g/dL A-G Ratio 0.6 (L) 0.8 - 1.7     CBC WITH AUTOMATED DIFF    Collection Time: 04/20/22  2:59 AM   Result Value Ref Range    WBC 7.1 4.6 - 13.2 K/uL    RBC 2.31 (L) 4.20 - 5.30 M/uL    HGB 6.4 (L) 12.0 - 16.0 g/dL    HCT 19.3 (L) 35.0 - 45.0 %    MCV 83.5 78.0 - 100.0 FL    MCH 27.7 24.0 - 34.0 PG    MCHC 33.2 31.0 - 37.0 g/dL    RDW 14.5 11.6 - 14.5 %    PLATELET 079 (L) 005 - 420 K/uL    NRBC 0.0 0  WBC    ABSOLUTE NRBC 0.00 0.00 - 0.01 K/uL    NEUTROPHILS 84 (H) 40 - 73 %    LYMPHOCYTES 5 (L) 21 - 52 %    MONOCYTES 8 3 - 10 %    EOSINOPHILS 2 0 - 5 %    BASOPHILS 0 0 - 2 %    IMMATURE GRANULOCYTES 1 (H) 0.0 - 0.5 %    ABS. NEUTROPHILS 6.0 1.8 - 8.0 K/UL    ABS. LYMPHOCYTES 0.4 (L) 0.9 - 3.6 K/UL    ABS. MONOCYTES 0.6 0.05 - 1.2 K/UL    ABS. EOSINOPHILS 0.1 0.0 - 0.4 K/UL    ABS. BASOPHILS 0.0 0.0 - 0.1 K/UL    ABS. IMM. GRANS. 0.1 (H) 0.00 - 0.04 K/UL    DF AUTOMATED     RBC, ALLOCATE    Collection Time: 04/20/22  4:15 AM   Result Value Ref Range    HISTORY CHECKED?  Historical check performed    HGB & HCT    Collection Time: 04/20/22  9:39 AM   Result Value Ref Range    HGB 7.3 (L) 12.0 - 16.0 g/dL    HCT 21.2 (L) 35.0 - 45.0 %         Chemistry Recent Labs     04/20/22  0259 04/19/22  1845 04/19/22  1225   GLU 80 86 85    132* 130*   K 4.1 4.0 5.2    101 97*   CO2 26 25 20*   BUN 48* 72* 95*   CREA 12.30* 14.90* 20.30*   CA 8.1* 8.2* 7.2*   AGAP 7 6 13   BUCR 4* 5* 5*   AP 62 59 64   TP 5.4* 5.9* 5.7*   ALB 2.0* 1.9* 2.0*   GLOB 3.4 4.0 3.7   AGRAT 0.6* 0.5* 0.5*        Lactic Acid Lactic acid   Date Value Ref Range Status   04/19/2022 0.5 0.4 - 2.0 MMOL/L Final     Recent Labs     04/19/22  1225   LAC 0.5        Liver Enzymes Protein, total   Date Value Ref Range Status   04/20/2022 5.4 (L) 6.4 - 8.2 g/dL Final     Albumin   Date Value Ref Range Status   04/20/2022 2.0 (L) 3.4 - 5.0 g/dL Final     Globulin   Date Value Ref Range Status   04/20/2022 3.4 2.0 - 4.0 g/dL Final     A-G Ratio Date Value Ref Range Status   04/20/2022 0.6 (L) 0.8 - 1.7   Final     Alk. phosphatase   Date Value Ref Range Status   04/20/2022 62 45 - 117 U/L Final     Recent Labs     04/20/22  0259 04/19/22  1845 04/19/22  1225   TP 5.4* 5.9* 5.7*   ALB 2.0* 1.9* 2.0*   GLOB 3.4 4.0 3.7   AGRAT 0.6* 0.5* 0.5*   AP 62 59 64        CBC w/Diff Recent Labs     04/20/22  0939 04/20/22  0259 04/19/22  1845 04/19/22  1225 04/19/22  1225   WBC  --  7.1 5.8  --  8.4   RBC  --  2.31* 2.01*  --  1.86*   HGB 7.3* 6.4* 5.5*   < > 4.9*   HCT 21.2* 19.3* 16.8*   < > 15.5*   PLT  --  126* 98*  --  101*   GRANS  --  84* 83*  --  92*   LYMPH  --  5* 6*  --  3*   EOS  --  2 2  --  0    < > = values in this interval not displayed.         Cardiac Enzymes No results found for: CPK, CK, CKMMB, CKMB, RCK3, CKMBT, CKNDX, CKND1, CALEB, TROPT, TROIQ, MARTHA, TROPT, TNIPOC, BNP, BNPP     BNP No results found for: BNP, BNPP, XBNPT     Coagulation Recent Labs     04/20/22 0259 04/19/22  1225   PTP 14.7 16.6*   INR 1.2 1.4*   APTT  --  52.4*         Thyroid  No results found for: T4, T3U, TSH, TSHEXT, TSHEXT    No results found for: T4     Urinalysis Lab Results   Component Value Date/Time    Color YELLOW 04/19/2022 05:59 PM    Appearance CLEAR 04/19/2022 05:59 PM    Specific gravity 1.006 04/19/2022 05:59 PM    pH (UA) 7.0 04/19/2022 05:59 PM    Protein 100 (A) 04/19/2022 05:59 PM    Glucose Negative 04/19/2022 05:59 PM    Ketone Negative 04/19/2022 05:59 PM    Bilirubin Negative 04/19/2022 05:59 PM    Urobilinogen 0.2 04/19/2022 05:59 PM    Nitrites Negative 04/19/2022 05:59 PM    Leukocyte Esterase TRACE (A) 04/19/2022 05:59 PM    Epithelial cells 2+ 04/19/2022 05:59 PM    Bacteria FEW (A) 04/19/2022 05:59 PM    WBC 0 to 3 04/19/2022 05:59 PM    RBC 1 to 3 04/19/2022 05:59 PM        Micro  Recent Labs     04/19/22  1454 04/19/22  1225   CULT NO GROWTH AFTER 18 HOURS NO GROWTH AFTER 20 HOURS     Recent Labs     04/19/22  1454 04/19/22  1225   CULT NO GROWTH AFTER 18 HOURS NO GROWTH AFTER 20 HOURS          Culture data during this hospitalization. All Micro Results     Procedure Component Value Units Date/Time    CULTURE, BODY FLUID Consuello Revering STAIN [[id number]]     Order Status: Sent Specimen: Peritoneal Fluid     CULTURE, BLOOD [[id number]] Collected: 04/19/22 1225    Order Status: Completed Specimen: Blood Updated: 04/20/22 0921     Special Requests: NO SPECIAL REQUESTS        Culture result: NO GROWTH AFTER 20 HOURS       CULTURE, BLOOD [[id number]] Collected: 04/19/22 1454    Order Status: Completed Specimen: Blood Updated: 04/20/22 0921     Special Requests: NO SPECIAL REQUESTS        Culture result: NO GROWTH AFTER 18 HOURS                  PFT       Ultrasound       LE Doppler       ECHO       Images report reviewed by me:  CT (Most Recent) (CT chest reviewed by me) Results from Hospital Encounter encounter on 04/19/22    CT ABD PELV WO CONT    Narrative  EXAM: CT of the abdomen and pelvis. HISTORY:  -Provided with order: abdominal pain, blood return from peritoneal dialysis port  -Additional: None    COMPARISON: 03/22/22    TECHNIQUE: Axial imaging was obtained through the abdomen and pelvis without  oral or intravenous contrast.  Please note that lack of oral contrast limits the  sensitivity for detection of bowel abnormalities. Lack of IV contrast limits  solid organ assessment and enhancement characteristics. Multiplanar reformats were generated. One or more dose reduction techniques were used on this CT: automated exposure  control, adjustment of the mAs and/or kVp according to patient size, and  iterative reconstruction techniques. The specific techniques used on this CT  exam have been documented in the patient's electronic medical record.     Digital Imaging and Communications in Medicine (DICOM) format image data are  available to nonaffiliated external healthcare facilities or entities on a  secure, media free, reciprocally searchable basis with patient authorization for  at least a 12-month period after this study. FINDINGS:    LOWER CHEST: Unremarkable. LIVER: Unremarkable. BILIARY: Gallbladder: No calcified gallstones or surrounding inflammatory  changes identified. No biliary ductal dilation. SPLEEN: Unremarkable. PANCREAS: Unremarkable. ADRENALS: Unremarkable. KIDNEYS: Unremarkable. LYMPH NODES: No enlarged lymph nodes. VASCULATURE: limited in assessment without IV contrast.  Atherosclerotic aorta  without aneurysm. Unremarkable. GASTROINTESTINAL TRACT:  Esophagus/stomach/duodenum: unremarkable. Appendix: .  Small/Large bowel: No bowel dilation or wall thickening. PELVIC ORGANS:  Bladder: Unremarkable. Estimated 5.4 x 5.5 x 5.1 cm spherical structure with  layering hyperdensity (44 HU) in the left adnexa. Otherwise unremarkable    OTHER: Moderate intraperitoneal free fluid and small amount of intraperitoneal  free air, likely related to peritoneal dialysis, with PD cannula in place coiled  in the right pelvis. BONES: No acute or aggressive osseous abnormalities identified. _______________    Impression  1. Cystic/hemorrhagic lesion in the left adnexa, potentially hemorrhagic ovarian  cyst. Please note that pelvic/transvaginal ultrasound is most sensitive to  evaluate the gynecologic structures, including evaluation for ovarian cyst or  torsion if clinically suspected. 2. Moderate amount of intraperitoneal free fluid and small amount of  intraperitoneal free air, which can simply be related to known peritoneal  dialysis. CXR reviewed by me:  XR (Most Recent). CXR  reviewed by me and compared with previous CXR Results from Hospital Encounter encounter on 04/19/22    XR ABD (KUB)    Narrative  HISTORY:  -From Provider:peritoneal dialysis shunt problem  -Additional: None    Technique: Single portable frontal abdominal radiograph. Comparison study: None.     Findings: Catheter tubing overlies the right abdomen, tip coiled in the right  lower quadrant, intact in visualized extent. .    Lung bases: Not included in field-of-view imaging. Bowel gas pattern: unremarkable in visualized extent. Relative paucity of bowel  in the pelvis and hazy increased density over the abdomen/pelvis may reflect  peritoneal dialysate. Other/osseous: Unremarkable. Impression  1. Right-sided catheter, presumed corresponding to known peritoneal dialysis  catheter. Frandy Shah MD  4/20/2022

## 2022-04-20 NOTE — ACP (ADVANCE CARE PLANNING)
Advance Care Planning   Advance Care Planning Inpatient Note  [facility]    Today's Date: 2022  [facility]    Received request from rounding. Upon review of chart and communication with care team, patient's decision making abilities are not in question. Patient was alone in the room during visit. Goals of ACP Conversation:  Discuss Advance Care planning documents    Health Care Decision Makers:      Primary Decision Maker: Meredith Turner - Mother - [phone]    Secondary Decision Maker: Heidy Mccormack - Girlfriend - [phone]      Summary:  Completed New Documents    Advance Care Planning Documents (Patient Wishes) on file:  Healthcare Power of /Advance Directive appointment of Health care agent     Assessment:    Patient is anxious, sad and frustrated with all that is going on with her. Her father  a few months ago \"of the same stuff I have. He was young. \" Patient talked about all she has already been through and fears of things only getting worse. She stated that she definitely wants her mom first \"I'm a momma's girl\" and she knows what I want. She was focused on wanting something to eat, but can't until surgery is decided on for today. \"I want food, but I also want to get this surgery over with! \"     Interventions:  Provided education on documents for clarity and greater understanding  Assisted in the completion of documents according to patient's wishes at this time    Care Preferences Communicated:  No    Outcomes/Plan:  New Advance Directive completed  Returned original document(s) to patient, as well as copies for distribution to appointed agents  Copy of Advance Directive given to staff to scan into medical record    Mariaelena Argueta [facility] on 2022 at 12:04 PM

## 2022-04-20 NOTE — PROGRESS NOTES
Hospitalist Progress Note    Patient: Jaycob Thibodeaux MRN: [id number]  CSN: [id number]    YOB: 1995  Age: 32 y.o. Sex: female    DOA: 4/19/2022 LOS:  LOS: 1 day          Chief Complaint:    severe anemia      Assessment/Plan     Jaycob Thibodeaux is a 32 y.o. female who with h/o ESRD on peritoneal dialysis due to Lupus and Lupus nephritis, GERD, HTN and anemia of CKD and secondary hyperparathyroidism who underwent a diagnostic laparoscopy last Friday which was 4 days ago and a new PD catheter was placed and the other one was removed.   Admitted for hemorrhagic anemia, anemia of CKD, end-stage renal disease on peritoneal dialysis but has hemodialysis fistula      Symptomatic anemia-s/p transfusion of blood, platelets, FFP, HGb has improved over 7  Anemia of CKD  End-stage renal disease on peritoneal dialysis but has hemodialysis fistula  Uncontrolled hypertension-resume her norvasc and labetalol  Abdominal pain-due to bleeding, better today  Intraperitoneal bleed-for exp lap today  Complex left adnexal lesion, very likely hemorrhagic  History of lupus nephritis-resume her plaquenil  Peritoneal dialysis catheter dysfunction  Hyponatremia  Bipolar d/o     Called GYN but apparenly already consult in per intensivist    Surgery this afternoon to assess for source of bleeding and eval ovay also if possible     ESRDon peritoneal dialysis but has hemodialysis fistula, will be dialyzed by HD while here in hospital     meds and labs reviewed    D/w nephrology and nursing    Procedure this afternoon, monitor in ICU for now    meds all reviewed and reordered for her BP, lytes, renal failure, iron deficiency and bipolar disorder  Disposition :  Patient Active Problem List   Diagnosis Code    Encephalopathy G93.40    Lupus ([facility] [address] M32.9    ESRD on dialysis ([facility] [address] N18.6, Z99.2    GERD (gastroesophageal reflux disease) K21.9    Hypertension I10    Thyroid disease E07.9    Psychiatric disorder F99    Anemia D64.9  Peritoneal hematoma S36.81XA    Peritoneal dialysis catheter dysfunction (HCC) T85.611A    Acute bleeding R58    Uncontrolled hypertension I10    Lupus nephritis (HCC) M32.14    Hemoperitoneum K66.1    Abnormal pelvic ultrasound R93.89       Subjective:    My pain is better today  Still some LLQ discomfort but its much better than yesterday she states  No nausea  Feels hungry  No diarrhea    Review of systems:    Constitutional: denies fevers, chills  Respiratory: denies SOB  Cardiovascular: denies chest pain        Vital signs/Intake and Output:  Visit Vitals  BP (!) 190/95   Pulse 86   Temp 99 °F (37.2 °C)   Resp 17   Wt 78.9 kg (174 lb)   SpO2 100%   BMI 28.08 kg/m²     Current Shift:  04/20 0701 - 04/20 1900  In: 321.3   Out: 1100 [Urine:1100]  Last three shifts:  04/18 1901 - 04/20 0700  In: 1250   Out: 3250 [Urine:1250]    Exam:    General: Well developed, alert, NAD, OX3  CVS:Regular rate and rhythm, no M/R/G, S1/S2 heard, no thrill  Lungs:Clear to auscultation bilaterally, no wheezes, rhonchi, or rales  Abdomen: Soft, mild distention, no peritoneal signs, dec BS  Extremities: No C/C/E, pulses palpable 2+  Fistula LUE  Neuro:grossly normal , follows commands  Psych:appropriate                Labs: Results:       Chemistry Recent Labs     04/20/22 0259 04/19/22 1845 04/19/22  1225   GLU 80 86 85    132* 130*   K 4.1 4.0 5.2    101 97*   CO2 26 25 20*   BUN 48* 72* 95*   CREA 12.30* 14.90* 20.30*   CA 8.1* 8.2* 7.2*   AGAP 7 6 13   BUCR 4* 5* 5*   AP 62 59 64   TP 5.4* 5.9* 5.7*   ALB 2.0* 1.9* 2.0*   GLOB 3.4 4.0 3.7   AGRAT 0.6* 0.5* 0.5*      CBC w/Diff Recent Labs     04/20/22  0939 04/20/22  0259 04/19/22  1845 04/19/22  1225 04/19/22  1225   WBC  --  7.1 5.8  --  8.4   RBC  --  2.31* 2.01*  --  1.86*   HGB 7.3* 6.4* 5.5*   < > 4.9*   HCT 21.2* 19.3* 16.8*   < > 15.5*   PLT  --  126* 98*  --  101*   GRANS  --  84* 83*  --  92*   LYMPH  --  5* 6*  --  3*   EOS  --  2 2  --  0    < > = values in this interval not displayed. Cardiac Enzymes No results for input(s): CPK, CKND1, CALEB in the last 72 hours. No lab exists for component: CKRMB, TROIP   Coagulation Recent Labs     04/20/22  0259 04/19/22  1225   PTP 14.7 16.6*   INR 1.2 1.4*   APTT  --  52.4*       Lipid Panel No results found for: CHOL, CHOLPOCT, CHOLX, CHLST, CHOLV, 011831, HDL, HDLP, LDL, LDLC, DLDLP, 809946, VLDLC, VLDL, TGLX, TRIGL, TRIGP, TGLPOCT, CHHD, CHHDX   BNP No results for input(s): BNPP in the last 72 hours.    Liver Enzymes Recent Labs     04/20/22  0259   TP 5.4*   ALB 2.0*   AP 62      Thyroid Studies No results found for: T4, T3U, TSH, TSHEXT, TSHEXT     Procedures/imaging: see electronic medical records for all procedures/Xrays and details which were not copied into this note but were reviewed prior to creation of Mil Wilson MD

## 2022-04-20 NOTE — PROGRESS NOTES
Pt seen and evaluated. Hemoperitoneum, likely secondary to hemorrhagic L adnexal lesion/cyst (pt has h/o previous pelvic hematoma likely due to same etiology ~10/21), anemia. H/H improved after transfusion. No urgent need for gen surg intervention at this time. D/W Dr. Jerod Kenny -- will evaluate pt today. Cont NPO for now -- if H/H stable/improved overnight, likely start PO tomorrow. Cont serial H/H, check PT/INR and Plts in am.  Following.     Consult dictated [id number]  RP

## 2022-04-20 NOTE — CONSULTS
[facility]  (A Division of [address] [place] [address])                                                                                                                      Evan Armenta MD  Office #: - Option # [phone]  Fax #: [phone]     Date of Admission: 4/19/2022Date of Note: 4/20/2022      Reason for Consult: Evaluation and antibiotic management of possible abd infection requested by Dr Lianet Singh. Thank you for involving me in the care of this complex and young patient. Please do not hesitate to contact me on the above number if question or concern. Current Antimicrobials:    Prior Antimicrobials:  Zosyn 4/19 to date    Immunosuppressive drugs: NA Zosyn -3/22  Cefazolin 4/15/22-- alexis-op       Assessment- ID related:  --------------------------------------------------------------------------      · Peritoneal hematoma due to cystic/adenxal rupture or torsionl lesion on CT  --Grossly bloody peritoneal fluid sent for analysis and culture: 4/20/2020  · S/P diagnositc lap, lysis of adhesion and removal of L PD cath for dysfunction, and new PD placement R 04/15/2022  · Anemia 2/2 above-- post PRBC    Other Medical Issues- Mx per respective team:    ESRD on PD  Hypertension       Recommendation for ID issues I am following:  ------------------------------------------------------------------------------    Clinical picture not impressive for peritonitis-- intervening for hemorragic peritonitis per surgical teams    DW Dr Lianet Singh-- can Lakia King today or tomorrow    FU fluid culture until final-> can adjust management if growth from fluid         HPI:  Lucho Cox is a 32 y.o. BLACK/ with PMH of ESRD on PD, recent admission and evaluation for dysfunctional PD cath in March 2022, and underwent diagnositic lap and new PD placement on 4/15/22. She was admitted due to blood form PD during dialysis and abdominal pain on 4/19/22.  She had no high fever or chills or leucocytosis. Found to have HB of 4.9 on admission. She received 3 PRBC. CT A/P was abnormal for adnexal/cystic lesion on left adenxa and US of pelvis showed complex and hemorragic L adenxal lesion. She is currently on Zosyn. At time of exam, reports she is weak and hungry. No severe abd pain. Diet resumption pending surgical teams plan per nursing. Denies N/V. No chills.         Active Hospital Problems    Diagnosis Date Noted    Anemia 04/19/2022    Peritoneal hematoma 04/19/2022    Peritoneal dialysis catheter dysfunction ([address] 04/19/2022    Acute bleeding 04/19/2022    Uncontrolled hypertension 04/19/2022    Lupus nephritis ([address] 04/19/2022    Hemoperitoneum 04/19/2022    Abnormal pelvic ultrasound 04/19/2022     Past Medical History:   Diagnosis Date    A-V fistula (HCC)     LEFT SIDE     Anxiety and depression     Asthma     Chronic kidney disease     ESRD- fresinius    Chronic pain     GERD (gastroesophageal reflux disease)     History of blood transfusion 2017, 2020    Hypertension     Lupus ([facility] [address]     Psychiatric disorder     Thyroid disease     hypo     Past Surgical History:   Procedure Laterality Date    HX HEENT  2021    oral surgery    HX UROLOGICAL  10906, 2019    biopsies of kidneys    HX VASCULAR ACCESS Left 2020    dialysis shunt (arm)    IR INSERT INTRAPERITONEAL CATH PERM      MO ABDOMEN SURGERY PROC UNLISTED  2015    Drain a boil     Family History   Problem Relation Age of Onset    Kidney Disease Father     Lupus Father      Social History     Socioeconomic History    Marital status: SINGLE     Spouse name: Not on file    Number of children: Not on file    Years of education: Not on file    Highest education level: Not on file   Occupational History    Not on file   Tobacco Use    Smoking status: Current Every Day Smoker    Smokeless tobacco: Never Used    Tobacco comment: 5 Black and milds daily d/t recent stress    Vaping Use    Vaping Use: Never used   Substance and Sexual Activity    Alcohol use: Not Currently     Comment: ocassionally    Drug use: Yes     Types: Marijuana     Comment: Instructed not to smoke 24 hours prior to [employer] Sexual activity: Not on file   Other Topics Concern     Service Not Asked    Blood Transfusions Not Asked    Caffeine Concern Not Asked    Occupational Exposure Not Asked   Xiomy Major Hazards Not Asked    Sleep Concern Not Asked    Stress Concern Not Asked    Weight Concern Not Asked    Special Diet Not Asked    Back Care Not Asked    Exercise Not Asked    Bike Helmet Not Asked   [address] Not Asked    Self-Exams Not Asked   Social History Narrative    Not on file     Social Determinants of Health     Financial Resource Strain:     Difficulty of Paying Living Expenses: Not on file   Food Insecurity:     Worried About Running Out of Food in the Last Year: Not on file    Krista of Food in the Last Year: Not on file   Transportation Needs:     Lack of Transportation (Medical): Not on file    Lack of Transportation (Non-Medical):  Not on file   Physical Activity:     Days of Exercise per Week: Not on file    Minutes of Exercise per Session: Not on file   Stress:     Feeling of Stress : Not on file   Social Connections:     Frequency of Communication with Friends and Family: Not on file    Frequency of Social Gatherings with Friends and Family: Not on file    Attends Druze Services: Not on file    Active Member of Clubs or Organizations: Not on file    Attends Club or Organization Meetings: Not on file    Marital Status: Not on file   Intimate Partner Violence:     Fear of Current or Ex-Partner: Not on file    Emotionally Abused: Not on file    Physically Abused: Not on file    Sexually Abused: Not on file   Housing Stability:     Unable to Pay for Housing in the Last Year: Not on file    Number of [place] in the Last Year: Not on file    Unstable Housing in the Last Year: Not on file       Allergies:  Banana, Clindamycin, and Sulfa (sulfonamide antibiotics)     Medications:  Current Facility-Administered Medications   Medication Dose Route Frequency    0.9% sodium chloride infusion 250 mL  250 mL IntraVENous PRN    metoprolol (LOPRESSOR) injection 2.5 mg  2.5 mg IntraVENous Q6H PRN    amLODIPine (NORVASC) tablet 10 mg  10 mg Oral PCL    calcium carbonate (OS-VIRGILIO) tablet 1,500 mg [elemental]  1,500 mg Oral AC&HS    [START ON 4/21/2022] escitalopram oxalate (LEXAPRO) tablet 10 mg  10 mg Oral DAILY    ferrous sulfate tablet 325 mg  1 Tablet Oral BID WITH MEALS    sevelamer carbonate (RENVELA) tab 800 mg  800 mg Oral TID WITH MEALS    labetaloL (NORMODYNE) tablet 200 mg  200 mg Oral TID    [START ON 4/21/2022] levothyroxine (SYNTHROID) tablet 100 mcg  100 mcg Oral ACB    hydrOXYchloroQUINE (PLAQUENIL) tablet 200 mg  200 mg Oral QHS    divalproex DR (DEPAKOTE) tablet 500 mg  500 mg Oral BID    albuterol (PROVENTIL HFA, VENTOLIN HFA, PROAIR HFA) inhaler 2 Puff  2 Puff Inhalation Q4H PRN    [START ON 4/21/2022] calcitRIOL (ROCALTROL) capsule 1.5 mcg  1.5 mcg Oral DAILY    hydrALAZINE (APRESOLINE) 20 mg/mL injection 20 mg  20 mg IntraVENous Q6H PRN    niCARdipine (CARDENE) 25 mg in 0.9% sodium chloride 250 mL infusion  0-15 mg/hr IntraVENous TITRATE    0.9% sodium chloride infusion 250 mL  250 mL IntraVENous PRN    bumetanide (BUMEX) injection 2 mg  2 mg IntraVENous Q12H    piperacillin-tazobactam (ZOSYN) 3.375 g in 0.9% sodium chloride (MBP/ADV) 100 mL MBP  3.375 g IntraVENous Q12H    0.9% sodium chloride infusion 250 mL  250 mL IntraVENous PRN    sodium chloride (NS) flush 5-40 mL  5-40 mL IntraVENous Q8H    sodium chloride (NS) flush 5-40 mL  5-40 mL IntraVENous PRN    acetaminophen (TYLENOL) tablet 650 mg  650 mg Oral Q6H PRN    Or    acetaminophen (TYLENOL) suppository 650 mg  650 mg Rectal Q6H PRN    polyethylene glycol (MIRALAX) packet 17 g  17 g Oral DAILY PRN    ondansetron (ZOFRAN ODT) tablet 4 mg  4 mg Oral Q8H PRN    Or    ondansetron (ZOFRAN) injection 4 mg  4 mg IntraVENous Q6H PRN    famotidine (PEPCID) tablet 20 mg  20 mg Oral DAILY    0.9% sodium chloride infusion 250 mL  250 mL IntraVENous PRN    oxyCODONE IR (ROXICODONE) tablet 10 mg  10 mg Oral Q6H PRN        ROS:  Pertinent items are noted in the History of Present Illness. Physical Exam:    Temp (24hrs), Av.2 °F (36.8 °C), Min:97.7 °F (36.5 °C), Max:99 °F (37.2 °C)    Visit Vitals  BP (!) 177/114   Pulse 91   Temp 99 °F (37.2 °C)   Resp 11   Wt 78.9 kg (174 lb)   LMP 2022 (Approximate)   SpO2 100%   BMI 28.08 kg/m²          GEN: WDWN, not in resp distress. HEENT: Unicteric. EOMI intact  CHEST: Non laboured breathing  CVS:RRR, no mur/gallop  ABD: Obese/soft. Non tender. NAYELY: Deferred  EXT: No apparent swelling or redness on UE/LE joints. Skin: Dry and intact. No rash, no redness. CNS: A, OX3. Moves all extremity. CN grossly ok.       Microbiology  All Micro Results     Procedure Component Value Units Date/Time    CULTURE, BODY FLUID Perlita Eagle STAIN [[id number]] Collected: 22 1017    Order Status: Completed Specimen: Peritoneal Fluid Updated: 22 1549     Special Requests: NO SPECIAL REQUESTS        GRAM STAIN 2+ WBCS SEEN         NO ORGANISMS SEEN        Culture result: PENDING    CULTURE, BLOOD [[id number]] Collected: 22 1225    Order Status: Completed Specimen: Blood Updated: 22 0921     Special Requests: NO SPECIAL REQUESTS        Culture result: NO GROWTH AFTER 20 HOURS       CULTURE, BLOOD [[id number]] Collected: 22 1454    Order Status: Completed Specimen: Blood Updated: 22 0921     Special Requests: NO SPECIAL REQUESTS        Culture result: NO GROWTH AFTER 18 HOURS                  Lines / Catheters:  Lab results:    Chemistry  Recent Labs     22  0259 22  1845 22  1225   GLU 80 86 85    132* 130*   K 4.1 4.0 5.2    101 97*   CO2 26 25 20*   BUN 48* 72* 95*   CREA 12.30* 14.90* 20.30*   CA 8.1* 8.2* 7.2*   AGAP 7 6 13   BUCR 4* 5* 5*   AP 62 59 64   TP 5.4* 5.9* 5.7*   ALB 2.0* 1.9* 2.0*   GLOB 3.4 4.0 3.7   AGRAT 0.6* 0.5* 0.5*       CBC w/ Diff  Recent Labs     04/20/22  0939 04/20/22  0259 04/19/22  1845 04/19/22  1225 04/19/22  1225   WBC  --  7.1 5.8  --  8.4   RBC  --  2.31* 2.01*  --  1.86*   HGB 7.3* 6.4* 5.5*   < > 4.9*   HCT 21.2* 19.3* 16.8*   < > 15.5*   PLT  --  126* 98*  --  101*   GRANS  --  84* 83*  --  92*   LYMPH  --  5* 6*  --  3*   EOS  --  2 2  --  0    < > = values in this interval not displayed. Imaging: report reviewed and as posted by radiologist     CT AP without contrast:    1. Cystic/hemorrhagic lesion in the left adnexa, potentially hemorrhagic ovarian  cyst. Please note that pelvic/transvaginal ultrasound is most sensitive to  evaluate the gynecologic structures, including evaluation for ovarian cyst or  torsion if clinically suspected.     2. Moderate amount of intraperitoneal free fluid and small amount of  intraperitoneal free air, which can simply be related to known peritoneal  dialysis.

## 2022-04-20 NOTE — CONSULTS
[address] [facility]    Name:  Sophia Kayser  MR#:   [id number]  :  1995  ACCOUNT #:  [de-identified]  DATE OF SERVICE:  2022      REASON FOR CONSULTATION:  Hemoperitoneum following recent laparoscopic peritoneal dialysis catheter insertion, anemia, abnormal CT findings. HISTORY OF PRESENT ILLNESS:  The patient is a 51-year-old [facility] American female with history of lupus nephritis resulting in end-stage renal disease. She underwent laparoscopic removal of old peritoneal dialysis catheter and insertion of new peritoneal dialysis catheter four days prior to this admission. The operation was uneventful with the catheter flushing clear heparinized saline at the end of the procedure. The patient does have a history of previous ovarian hemorrhagic cyst resulting in pelvic hematoma in 10/2021 and that hematoma resolved spontaneously and I did not see any evidence of any significant pelvic or adnexal acute findings at the time of my surgery. The patient complained of incisional pain, but otherwise felt just a little \"run down\" after that surgery. She denied any fever or chills. She denied any increase in abdominal pain. When she met with the Home Dialysis Team on the day of admission, they did note that when they accessed her peritoneal dialysis catheter, dark red blood came out, and it was for this reason that she was then sent to the emergency room for additional workup, evaluation, and treatment. Currently, the patient states that she is feeling somewhat better than yesterday in terms of abdominal discomfort. She has had no nausea since yesterday. She moved her bowels in the past and gas earlier today. She denies any chest pain, shortness of breath, or other symptoms at this time. PAST MEDICAL HISTORY:  1.  Lupus. 2.  End-stage renal disease, on dialysis. 3.  Gastroesophageal reflux disease. 4.  Hypertension. 5.  Thyroid disease. PAST SURGICAL HISTORY:  1.   Left upper extremity AV fistula. 2.  Laparoscopic peritoneal dialysis catheter insertion 12/2021 (left side insertion). 3.  Laparoscopic removal of left-sided peritoneal dialysis catheter insertion; new right-sided peritoneal dialysis catheter insertion on 04/15/2022. 4.  Oral surgery. 5.  Kidney biopsies. MEDICATIONS:  Please see med rec sheet. She is not on any blood thinners or antiplatelet medications. ALLERGIES:  CLINDAMYCIN CAUSES DIARRHEA. SULFA CAUSES HIVES. SOCIAL HISTORY:  She does not smoke, drink alcohol or use any illicit drugs. FAMILY HISTORY:  Noncontributory. REVIEW OF SYSTEMS:  A 12-point review of systems was reviewed with the patient, was negative apart from that listed in the HPI. PHYSICAL EXAMINATION:  GENERAL:  She is well developed, well nourished, awake, alert, resting comfortably in bed, in no acute distress. VITAL SIGNS:  She has been afebrile since arrival, last temperature taken was last night at 21:45 at 98.9, at that time she has been for the most part hypertensive, last blood pressure recorded 172/78, pulse at that time 92, respirations 16, she is satting 97% on room air. HEENT:  Normocephalic, atraumatic. Pupils equal, round, and reactive to light and accommodation. Extraocular movements intact. Sclerae anicteric bilaterally. NECK:  Supple. No JVD. CARDIOVASCULAR:  Regular rate and rhythm. LUNGS:  Clear to auscultation. ABDOMEN:  Soft, nondistended. Bowel sounds are present, somewhat quiet. All surgical incisions are clean and dry. Left-sided old peritoneal dialysis catheter removal site dressing is dry. Right-sided new peritoneal dialysis catheter insertion site has gauze dressing with some sanguineous drainage on it. No peritoneal signs. EXTREMITIES:  No clubbing or cyanosis. Good capillary refill. No calf tenderness. ANCILLARY STUDIES:  Labs, on admission white count was normal at 8.4, repeat this morning 7.1.   H and H on admission was 4.9 and 15.5 down from her last preop one which was 9.1 and 30.0. After 3 units transfused, her H and H this morning 7.3 and 21.2. Platelets on admission, 101, repeat this morning 126. Coags:  INR was minimally elevated yesterday 1.4, she did receive a unit of FFP, it has improved to 1.2 today, PT this morning 14.7. BMP, low sodium on admission 130, improved today to 138, otherwise BMP normal apart from the expected elevations. BUN and creatinine on admission 95 and 20.30; following hemodialysis through catheter this morning's dialysis, they are improved to 48 and 12.3. RADIOLOGY:  CT Of the abdomen and pelvis done yesterday without contrast shows moderate intraperitoneal free fluid likely representing hemoperitoneum/hematoma, cystic/hemorrhagic lesion in the left adnexa consistent with hemorrhagic ovarian cyst.  Subsequent pelvic ultrasound was performed also yesterday showed a complex left adnexal lesion very likely hemorrhagic, possibly represented hemorrhagic ovarian cyst versus endometrioma as well as pelvic fluid consistent with CT findings. ASSESSMENT:  A 14-year-old  female with end-stage renal disease, on dialysis, now admitted with hemoperitoneum and subsequent anemia likely secondary to hemorrhagic left adnexal lesion, possible hemorrhagic cyst.    PLAN:  As her hemoglobin has improved appropriately with transfusion and hemodynamically she is for the most part stable, I do not feel that urgent surgical intervention is required at this time. I did discuss the case with Dr. Angela Fair of GYN, who will be seeing the patient later today to decide if she needs to take a look operatively as well. We will continue to resuscitate as needed. Keep her NPO for now with IV fluids waiting for Medicine/Nephrology recommendations in case surgery does become necessary.   I did discuss with the patient and her mother that should there be evidence ongoing or worsening bleeding (which at this time there is not, as the blood coming from the peritoneal dialysis catheter appears to be dark/old blood) that more urgent surgical intervention may be required, that surgery would consist of laparoscopy, possible laparotomy, control of bleeding, and indicated procedures. I discussed with them the nature of that surgery, the alternatives as well as the benefits and risks. Continue to follow her H and H and platelets, transfuse as needed. We would repeat the coags one more time in the morning just to make sure that they are at the very least stable. We will continue to follow the peritoneal dialysis catheter can be to gravity if okay with Nephrology to allow what hemoperitoneum is present to continue to drain.       Manpreet Jin MD      [id number]  D:  04/20/2022 14:13  T:  04/20/2022 19:13  JOB #:  [id number]  CC:  MD Celia Michelle MD

## 2022-04-21 LAB
ALBUMIN SERPL-MCNC: 1.9 G/DL (ref 3.4–5)
ALBUMIN/GLOB SERPL: 0.6 {RATIO} (ref 0.8–1.7)
ALP SERPL-CCNC: 53 U/L (ref 45–117)
ALT SERPL-CCNC: <6 U/L (ref 13–56)
ANION GAP SERPL CALC-SCNC: 6 MMOL/L (ref 3–18)
AST SERPL-CCNC: 9 U/L (ref 10–38)
BASOPHILS # BLD: 0 K/UL (ref 0–0.1)
BASOPHILS NFR BLD: 0 % (ref 0–2)
BILIRUB SERPL-MCNC: 0.5 MG/DL (ref 0.2–1)
BUN SERPL-MCNC: 56 MG/DL (ref 7–18)
BUN/CREAT SERPL: 4 (ref 12–20)
CALCIUM SERPL-MCNC: 7.8 MG/DL (ref 8.5–10.1)
CHLORIDE SERPL-SCNC: 108 MMOL/L (ref 100–111)
CO2 SERPL-SCNC: 28 MMOL/L (ref 21–32)
CREAT SERPL-MCNC: 14.2 MG/DL (ref 0.6–1.3)
DIFFERENTIAL METHOD BLD: ABNORMAL
EOSINOPHIL # BLD: 0.2 K/UL (ref 0–0.4)
EOSINOPHIL NFR BLD: 3 % (ref 0–5)
ERYTHROCYTE [DISTWIDTH] IN BLOOD BY AUTOMATED COUNT: 14.9 % (ref 11.6–14.5)
GLOBULIN SER CALC-MCNC: 3.3 G/DL (ref 2–4)
GLUCOSE SERPL-MCNC: 92 MG/DL (ref 74–99)
HCT VFR BLD AUTO: 20.9 % (ref 35–45)
HCT VFR BLD AUTO: 22.3 % (ref 35–45)
HGB BLD-MCNC: 6.8 G/DL (ref 12–16)
HGB BLD-MCNC: 7.4 G/DL (ref 12–16)
HISTORY CHECKED?,CKHIST: NORMAL
HISTORY CHECKED?,CKHIST: NORMAL
IMM GRANULOCYTES # BLD AUTO: 0.1 K/UL (ref 0–0.04)
IMM GRANULOCYTES NFR BLD AUTO: 1 % (ref 0–0.5)
INR PPP: 1.1 (ref 0.8–1.2)
LYMPHOCYTES # BLD: 0.7 K/UL (ref 0.9–3.6)
LYMPHOCYTES NFR BLD: 11 % (ref 21–52)
MCH RBC QN AUTO: 27.9 PG (ref 24–34)
MCHC RBC AUTO-ENTMCNC: 32.5 G/DL (ref 31–37)
MCV RBC AUTO: 85.7 FL (ref 78–100)
MONOCYTES # BLD: 0.8 K/UL (ref 0.05–1.2)
MONOCYTES NFR BLD: 13 % (ref 3–10)
NEUTS SEG # BLD: 4.2 K/UL (ref 1.8–8)
NEUTS SEG NFR BLD: 72 % (ref 40–73)
NRBC # BLD: 0 K/UL (ref 0–0.01)
NRBC BLD-RTO: 0 PER 100 WBC
PLATELET # BLD AUTO: 135 K/UL (ref 135–420)
PMV BLD AUTO: 12.8 FL (ref 9.2–11.8)
POTASSIUM SERPL-SCNC: 4.6 MMOL/L (ref 3.5–5.5)
PROT SERPL-MCNC: 5.2 G/DL (ref 6.4–8.2)
PROTHROMBIN TIME: 14 SEC (ref 11.5–15.2)
RBC # BLD AUTO: 2.44 M/UL (ref 4.2–5.3)
SODIUM SERPL-SCNC: 142 MMOL/L (ref 136–145)
WBC # BLD AUTO: 5.8 K/UL (ref 4.6–13.2)

## 2022-04-21 PROCEDURE — 74011250637 HC RX REV CODE- 250/637: Performed by: FAMILY MEDICINE

## 2022-04-21 PROCEDURE — P9016 RBC LEUKOCYTES REDUCED: HCPCS

## 2022-04-21 PROCEDURE — 74011250636 HC RX REV CODE- 250/636: Performed by: INTERNAL MEDICINE

## 2022-04-21 PROCEDURE — 80053 COMPREHEN METABOLIC PANEL: CPT

## 2022-04-21 PROCEDURE — 74011000250 HC RX REV CODE- 250: Performed by: INTERNAL MEDICINE

## 2022-04-21 PROCEDURE — 74011250637 HC RX REV CODE- 250/637: Performed by: STUDENT IN AN ORGANIZED HEALTH CARE EDUCATION/TRAINING PROGRAM

## 2022-04-21 PROCEDURE — 85610 PROTHROMBIN TIME: CPT

## 2022-04-21 PROCEDURE — 65270000029 HC RM PRIVATE

## 2022-04-21 PROCEDURE — 74011000250 HC RX REV CODE- 250: Performed by: STUDENT IN AN ORGANIZED HEALTH CARE EDUCATION/TRAINING PROGRAM

## 2022-04-21 PROCEDURE — 74011250636 HC RX REV CODE- 250/636: Performed by: HOSPITALIST

## 2022-04-21 PROCEDURE — 90935 HEMODIALYSIS ONE EVALUATION: CPT

## 2022-04-21 PROCEDURE — 85018 HEMOGLOBIN: CPT

## 2022-04-21 PROCEDURE — 85025 COMPLETE CBC W/AUTO DIFF WBC: CPT

## 2022-04-21 PROCEDURE — 74011250637 HC RX REV CODE- 250/637: Performed by: HOSPITALIST

## 2022-04-21 PROCEDURE — 74011000258 HC RX REV CODE- 258: Performed by: INTERNAL MEDICINE

## 2022-04-21 PROCEDURE — 36430 TRANSFUSION BLD/BLD COMPNT: CPT

## 2022-04-21 PROCEDURE — 74011000250 HC RX REV CODE- 250: Performed by: FAMILY MEDICINE

## 2022-04-21 PROCEDURE — 36415 COLL VENOUS BLD VENIPUNCTURE: CPT

## 2022-04-21 RX ORDER — LORAZEPAM 2 MG/ML
1 INJECTION INTRAMUSCULAR
Status: DISCONTINUED | OUTPATIENT
Start: 2022-04-21 | End: 2022-04-22

## 2022-04-21 RX ORDER — VANCOMYCIN HYDROCHLORIDE
1250 ONCE
Status: COMPLETED | OUTPATIENT
Start: 2022-04-22 | End: 2022-04-22

## 2022-04-21 RX ORDER — SODIUM CHLORIDE 9 MG/ML
250 INJECTION, SOLUTION INTRAVENOUS AS NEEDED
Status: DISCONTINUED | OUTPATIENT
Start: 2022-04-21 | End: 2022-04-22 | Stop reason: HOSPADM

## 2022-04-21 RX ORDER — LORAZEPAM 2 MG/ML
1 INJECTION INTRAMUSCULAR ONCE
Status: COMPLETED | OUTPATIENT
Start: 2022-04-21 | End: 2022-04-21

## 2022-04-21 RX ORDER — SODIUM CHLORIDE 9 MG/ML
1000 INJECTION, SOLUTION INTRAVENOUS CONTINUOUS
Status: DISCONTINUED | OUTPATIENT
Start: 2022-04-21 | End: 2022-04-21

## 2022-04-21 RX ORDER — LORAZEPAM 2 MG/ML
INJECTION INTRAMUSCULAR
Status: DISPENSED
Start: 2022-04-21 | End: 2022-04-21

## 2022-04-21 RX ORDER — LOSARTAN POTASSIUM 50 MG/1
50 TABLET ORAL DAILY
Status: DISCONTINUED | OUTPATIENT
Start: 2022-04-21 | End: 2022-04-22 | Stop reason: HOSPADM

## 2022-04-21 RX ADMIN — BUMETANIDE 2 MG: 0.25 INJECTION, SOLUTION INTRAMUSCULAR; INTRAVENOUS at 21:14

## 2022-04-21 RX ADMIN — BUMETANIDE 2 MG: 0.25 INJECTION, SOLUTION INTRAMUSCULAR; INTRAVENOUS at 14:39

## 2022-04-21 RX ADMIN — DIVALPROEX SODIUM 500 MG: 500 TABLET, DELAYED RELEASE ORAL at 11:17

## 2022-04-21 RX ADMIN — FERROUS SULFATE TAB 325 MG (65 MG ELEMENTAL FE) 325 MG: 325 (65 FE) TAB at 17:21

## 2022-04-21 RX ADMIN — AMLODIPINE BESYLATE 10 MG: 5 TABLET ORAL at 14:38

## 2022-04-21 RX ADMIN — SODIUM CHLORIDE, PRESERVATIVE FREE 10 ML: 5 INJECTION INTRAVENOUS at 14:00

## 2022-04-21 RX ADMIN — CALCIUM 1500 MG: 500 TABLET ORAL at 14:39

## 2022-04-21 RX ADMIN — LEVOTHYROXINE SODIUM 100 MCG: 0.1 TABLET ORAL at 14:43

## 2022-04-21 RX ADMIN — METOPROLOL TARTRATE 2.5 MG: 5 INJECTION INTRAVENOUS at 05:29

## 2022-04-21 RX ADMIN — ESCITALOPRAM OXALATE 10 MG: 10 TABLET ORAL at 11:17

## 2022-04-21 RX ADMIN — FAMOTIDINE 20 MG: 20 TABLET, FILM COATED ORAL at 14:40

## 2022-04-21 RX ADMIN — CALCITRIOL CAPSULES 0.25 MCG 1.5 MCG: 0.25 CAPSULE ORAL at 14:39

## 2022-04-21 RX ADMIN — HYDROXYCHLOROQUINE SULFATE 200 MG: 200 TABLET, FILM COATED ORAL at 21:13

## 2022-04-21 RX ADMIN — LORAZEPAM 1 MG: 2 INJECTION INTRAMUSCULAR; INTRAVENOUS at 11:46

## 2022-04-21 RX ADMIN — DIVALPROEX SODIUM 500 MG: 500 TABLET, DELAYED RELEASE ORAL at 21:13

## 2022-04-21 RX ADMIN — CALCIUM 1500 MG: 500 TABLET ORAL at 21:13

## 2022-04-21 RX ADMIN — LOSARTAN POTASSIUM 50 MG: 50 TABLET, FILM COATED ORAL at 14:37

## 2022-04-21 RX ADMIN — LABETALOL HYDROCHLORIDE 200 MG: 100 TABLET, FILM COATED ORAL at 14:43

## 2022-04-21 RX ADMIN — LABETALOL HYDROCHLORIDE 200 MG: 100 TABLET, FILM COATED ORAL at 21:15

## 2022-04-21 RX ADMIN — OXYCODONE 10 MG: 5 TABLET ORAL at 17:21

## 2022-04-21 RX ADMIN — SEVELAMER CARBONATE 800 MG: 800 TABLET, FILM COATED ORAL at 14:39

## 2022-04-21 RX ADMIN — OXYCODONE 10 MG: 5 TABLET ORAL at 09:34

## 2022-04-21 RX ADMIN — CALCIUM 1500 MG: 500 TABLET ORAL at 17:21

## 2022-04-21 RX ADMIN — PIPERACILLIN AND TAZOBACTAM 3.38 G: 3; .375 INJECTION, POWDER, LYOPHILIZED, FOR SOLUTION INTRAVENOUS at 17:21

## 2022-04-21 RX ADMIN — PIPERACILLIN AND TAZOBACTAM 3.38 G: 3; .375 INJECTION, POWDER, LYOPHILIZED, FOR SOLUTION INTRAVENOUS at 04:20

## 2022-04-21 RX ADMIN — SODIUM CHLORIDE, PRESERVATIVE FREE 10 ML: 5 INJECTION INTRAVENOUS at 05:24

## 2022-04-21 RX ADMIN — OXYCODONE 10 MG: 5 TABLET ORAL at 22:51

## 2022-04-21 RX ADMIN — SODIUM CHLORIDE, PRESERVATIVE FREE 10 ML: 5 INJECTION INTRAVENOUS at 21:17

## 2022-04-21 RX ADMIN — SEVELAMER CARBONATE 800 MG: 800 TABLET, FILM COATED ORAL at 17:21

## 2022-04-21 NOTE — PROGRESS NOTES
TREATMENT SUMMARY     Patient in room 106 dialyzed for 3.0  hours. Tolerated fairly tx well. Patient clotted after 3 hrs of TX. Nephrologist informed stated not to restart the patient. Patient remained asymptomatic. without complaint or complications. Left AVF functioning without complication. [id number]  [id number]  2700 ml UF removed with a net UF removal of 2200 ml. Report given to  Diamante James RN with all questions answered. TREATMENT NOTES                                                                                                ACUTE HEMODIALYSIS FLOW SHEET       PATIENT INFORMATION   [address] [facility]       DR. Tacho Dacosta MD    []1st Time Acute  []Stat[x] Routine []Urgent [x]Chronic Unit   []Acute Room []Bedside  []ICU/CCU []ER   Isolation Precautions: [x]Dialysis[] Airborne []Contact []Droplet []Reverse    Special Considerations:_______  [] Blood Consent Verified  [x]N/A   Allergies:[] NKA                 [x] _Banana____________ Code Status [x]Full Code [] DNR  [] Other_____   Diet: [x] Renal [] NPO [] Diabetic   [] Enteral Feeding [] Cardiac Diabetic: []Yes [x]No     [x]Signed Treatment Consent Verified   [x] Time Out/ Safety Check   PRIMARY NURSE REPORT: FIRST INITIAL/ LAST NAME/TITLE  PRE DIALYSIS:   Sonia Anny                                        TIME:1015   ACCESS   CATHETER ACCESS: [] N/A  [] RIGHT  [] LEFT  [] IJ  [] SUBCL [] FEM                    [] First use X-ray  [] Tunnel     [] Non-Tunneled      [] No S/S infection  [] Redness [] Drainage  [] Cultured [] Swelling [] Pain                    [] Medical Aseptic [] Prep Dressing Changed                  [] Clotted [] Patent []      Flows: [] Good [] Poor [] Reversed                 If Access Problem Dr. Dolores Serrato: [] Yes [] No    Date:_____  [] N/A[]   GRAFT/FISTULA ACCESS:  [] N/A  [] RIGHT  [] LEFT  [] UE   [] LE       [] AVG  [x] AVF [] BUTTONHOLE    [x] +BRUIT/THRILL [] MEDICAL ASEPTIC PREP     [] No S/S infection  [] Redness [] Drainage  [] Cultured [] Swelling [] Pain              If Access Problem Dr. Anette Mckeon: [] Yes [] No    Date:______ [x] N/A   GENERAL ASSESSMENT   LUNGS:  SaO2% _100___ [x] Clear [] Coarse [] Crackles [] Wheezing               [] Diminished Location: [x] RLL [x] LLL [] RUL [] MOISES    COUGH:  [] Productive  [] Loose[] Dry [x] N/A  RESPIRATIONS: [x] Easy [] Labored    THERAPY: [] RA   [] NC- Room air_____. L/min    Mask: [] NRB [] Venti  _____O2%                  [] Ventilator [] Intubated [] Trach [] BiPap [] CPap [] HI Flow   CARDIAC: [x] Regular [] Irregular [] Pericardial Rub [] JVD               Monitored Rhythm:______ [] N/A   EDEMA: [] None [] Generalized [] Facial [] Pedal [] UE [] LE             [] Pitting [] 1 [] 2 [] 3 [] 4    [] Right [] Left [] Bilateral   SKIN:    [x] Warm [] Hot [] Cold  [] Dry [x] Pale [] Diaphoretic              [] Flushed [] Jaundiced [] Cyanotic [] Rash [] Weeping     LOC:    [x] Alert  Oriented to: [] Person [] Place [] Time             [] Confused [] Lethargic [] Medicated [] Non-responsive    GI/ABDOMEN: [x] Flat [] Distended [] Soft [] Firm [] Diarrhea [] Bowel Sounds Present [] Nausea [] Vomiting    PAIN: [x] 0 [] 1 [] 2 [] 3 [] 4 [] 5 [] 6 [] 7 [] 8 [] 9 [] 10          Scale 1-10 Action/Follow Up_____   MOBILITY: [] Amb [x] Amb/Assist [] Bed  [] Wheelchair    CURRENT LABS   HBsAg ONLY: Date Drawn: 4/19/2022            [x] Negative [] Positive [] Unknown.      HBsAb: Date Drawn:  4/19/2022            [] Susceptible <10 [x] Immune ?10 [] Unknown   Date of Current Labs:  ATTACHED     EDUCATION   Person Educated: [x] Patient [] Other_________   Knowledge base: [] None [] Minimal [x] Substantial    Barriers to learning  [x] None _______________   Preferred method of learning: [] Written [] Oral [x] Visual [] Hands on    Topic: [x] Access Care [x] S&S of infection [x] Fluid Management   [] K+  [x] Procedural  [] Albumin [] Medications [] Tx Options   [] Transplant [] Diet [] Other    Teaching Tools: [] Explain [] Demonstration [] Handout_____ [] Video______  CARE PLAN    [x] Renal Failure (Adult)  Interdisciplinary  · Fluid and electrolytes stabilized  ? Interventions  · Dehydration signs and symptoms (eg: Weight/lab monitoring; vomiting/diarrhea/urine; tenting; mucous membranes; dizziness/lethargy/irritability/confusion; weak pulse; tachycardia; blood pressure; I&O)  · Fluid overload signs and symptoms assessment (eg: Body weight increased; dyspnea; edema; hypertension; respiratory crackles/wheezing; JVD; lab monitoring; mental status changes; I&O)  · Monitor appropriate lab values  · COMPLIANCE WITH PRESCRIBED THERAPY  · ARTERIAL ACCESS SITE ASSESSMENT  · NUTRITION SCREENING  · Vital signs monitoring per assessed patient condition or unit standard  · Cardiac monitoring  · Hydration management  · Intake and output measurement  · Body weight monitoring  · Skin care  · DIALYSIS  · Nutrition Care Process per nutrition screen  · Oral hygiene care every 2 hours  · Pain management     · Outcome   ? [x] Progressing Towards Goal  ? [] Not Progressing Towards Goals  ? [] Goals Met/Resolved  ? [] Goals Not Met/ Resolved        · Patient/ Family Education  ? Progressing Towards Goals          RO/HEMODIAYLSIS MACHINE SAFETY CHECKS- BEFORE EACH TREATMENT          [] [facility]: Machine Serial #1:  [id number]   RO Serial #1: [id number]                       [] [facility]: Machine Serial #2:  [id number]    Serial #2: [id number]        [x] [facility]: Machine Serial #3:  [id number]    Serial #9:[id number]    Alarm Test: [x] Pass  Time_10:06 Am_______  [x] RO/Machine Log Complete    [x] Extracorporeal circuit Tested for integrity           Dialyzer [id number]__________   Tubing_21J02-12___________    Dialysate: pH_7.2__  Temp. 36.0____Conductivity: Meter 14.0____ HD Machine_14.0__   CHLORINE TESTING- BEFORE EACH TREATMENT AND EVERY 4 HOURS   Total Chlorine: [x] Less than 0.1 ppm Time:1000_____2nd Check Time:_n/a_____  (If greater than 0.1 ppm from Primary then every 30 minutes from Secondary)   TREATMENT INIATION-WITH DIALYSIS PRECAUTIONS   [x] All Connections Secured   [x] Saline Line Double Clamped    [x] Venous Parameters Set [x] Arterial Parameters Set    [x] Prime Given 250 ml     [x] Air Foam Detector Engaged   PRE-TREATMENT   UF Calculations: Wt to lose:2500____ml(+) Oral:__ml(+)IV Meds/Fluids/Blood prods___ml(+) Prime/Rinse_500_ml(=)Total UF Goal__3000__mL   Scale Type:[x] Bed scale [] Sling Scale [] Wheel Chair Scale []  Not Ordered [] [] Unable to obtain pt on stretcher/ bed scale malfunctioning   Tx Initiation Note:TX started without issues. Cannulation done using needle G 15. TX started with low blood flow  rate of 200, then increased to 400 ml/min as ordered. [x] Time Out/Safety Check  Time:_1010 am____   INTRADIALYTIC MONITORING  (SEE ATTACHED FLOWSHEET)     POST TREATMENT    Time Medication Dose Volume Route    Initials                                           [facility] Signatures Title Initials Time                     Dialyzer cleared: [] Good [x] Fair [] Poor     Blood Processed 69.3___Liters    Net UF Removed 2200____mL  Post Tx Access:                  AVF/AVG: Bleeding Stop       Art. 10___min Josue.__10__min [x]+bruit/thrill                Catheter: Locking Solution [] Heparin 1 ml/1000 units  [] Normal Saline                                                                               Art._____ ml Josue._____ml  Post Assessment:              Skin: [x]Warm []Dry []Diaphoretic []Flushed [x] Pale []Cyanotic            Lungs: [x]Clear []Coarse []Crackles []Wheezing             Cardiac: [x]Regular []Irregular  []Monitored rhythm____ []N/A            Edema: []None []General []Facial []Pedal  []UE []LE [x]RIGHT [x]LEFT            Pain: [x]0 []1 []2 []3 []4 []5 []6 []7 []8 []9 []10   POST Tx Note:   Patient in room 106 dialyzed for 3.0  hours. Tolerated fairly tx well.  Patient clotted after 3 hrs of TX. Nephrologist informed stated not to restart [place]. Dev Gimeneztle Patient remained asymptomatic. without complaint or complications. Left AVF functioning without complication. [id number]  [id number]  2700 ml UF removed with a net UF removal of 2200 ml. Report given to  Terri Norton RN with all questions answered.                  Primary Nurse Report: First initial/Last name/Title    Post Dialysis:_Vic Kowalski________________________         Time:_1345_______________    Abbreviations: AVG-arterial venous graft, AVF-arterial venous fistula, IJ-Internal Jugular,  Subcl-Subclavian, Fem-Femoral, Tx-treatment, AP/HR-apical heart rate, DFR-dialysate flow rate, BFR-blood flow rate, AP-arterial pressure, -venous pressure, UF-ultrafiltrate, TMP-transmembrane pressure, Josue-Venous, Art-Arterial, RO-Reverse Osmosis

## 2022-04-21 NOTE — PROGRESS NOTES
Received report from [facility] at 21  am. Mary Genre was initiated while patient was in bed without difficulty. Aseptically, Left AVF was cannulated with G 15 needle and flushed without problem. Treatment was started without issues. Will continue to monitor patients closely during treatment.

## 2022-04-21 NOTE — PROGRESS NOTES
Hospitalist Progress Note    Patient: Aravind Parikh MRN: [id number]  CSN: [id number]    YOB: 1995  Age: 32 y.o.   Sex: female    DOA: 4/19/2022 LOS:  LOS: 2 days          Chief Complaint:    hemorrhage      Assessment/Plan        Jose Poon a 32 y.o. female who with h/o ESRD on peritoneal dialysis due to Lupus and Lupus nephritis, GERD, HTN and anemia of CKD and secondary hyperparathyroidism who underwent a diagnostic laparoscopy last Friday which was 4 days ago and a new PD catheter was placed and the other one was removed.  Admitted for hemorrhagic anemia, anemia of CKD, end-stage renal disease on peritoneal dialysis but has hemodialysis fistula      Symptomatic anemia-with hemoperitoneum, improved  Seen by GYN  Surgery put on hold as she clinically showed signs for no active bleeding  Anemia of CKD  End-stage renal disease on peritoneal dialysis but has hemodialysis fistula  Uncontrolled hypertension-resume her norvasc and labetalol  Abdominal pain-due to bleeding, better today  Complex left adnexal lesion, very likely hemorrhagic  History of lupus nephritis-resume her plaquenil  Peritoneal dialysis catheter dysfunction  Hyponatremia  Bipolar d/o    Just finishing transfusion when I rounded this am, see Hgb has come up over 7 now     She will likely insist on being discharged later today     ESRD on peritoneal dialysis, but hemodialysis again here today    Ativan for anxiety PRN     Transfer to med floor, observe        Disposition :  Patient Active Problem List   Diagnosis Code    Encephalopathy G93.40    Lupus ([address] M32.9    ESRD on dialysis ([address] N18.6, Z99.2    GERD (gastroesophageal reflux disease) K21.9    Hypertension I10    Thyroid disease E07.9    Psychiatric disorder F99    Anemia D64.9    Peritoneal hematoma S36.81XA    Peritoneal dialysis catheter dysfunction ([address] T85.611A    Acute bleeding R58    Uncontrolled hypertension I10    Lupus nephritis ([address] M32.14    Hemoperitoneum K66.1    Abnormal pelvic ultrasound R93.89       Subjective:  I need to go home! Why are they feeding me baby food! anxious  But oriented and awake, complaints about wanting to be released    dialysis planned today      Review of systems:    Constitutional: denies fevers  Respiratory: denies [address]  Cardiovascular: denies chest pain  Gastrointestinal: denies nausea, vomiting, diarrhea      Vital signs/Intake and Output:  Visit Vitals  BP (!) 159/95   Pulse 82   Temp 98.7 °F (37.1 °C) (Oral)   Resp 15   Ht 5' 6\" (1.676 m)   Wt 79.4 kg (175 lb 0.7 oz)   SpO2 99%   BMI 28.25 kg/m²     Current Shift:  04/21 0701 - 04/21 1900  In: 505.4   Out: -   Last three shifts:  04/19 1901 - 04/21 0700  In: 1936.7 [P.O.:120;  I.V.:575.4]  Out: 4350 [Urine:2350]    Exam:    General: Well developed, alert, NAD, OX3  CVS:Regular rate and rhythm, no M/R/G, S1/S2 heard, no thrill  Lungs:Clear to auscultation bilaterally, no wheezes, rhonchi, or rales  Abdomen: Soft, Nontender, No distention  Extremities: No C/C/E, pulses palpable 2+  Neuro:grossly normal , follows commands  Psych:abrupt and anxious                Labs: Results:       Chemistry Recent Labs     04/21/22  0420 04/20/22  0259 04/19/22  1845   GLU 92 80 86    138 132*   K 4.6 4.1 4.0    105 101   CO2 28 26 25   BUN 56* 48* 72*   CREA 14.20* 12.30* 14.90*   CA 7.8* 8.1* 8.2*   AGAP 6 7 6   BUCR 4* 4* 5*   AP 53 62 59   TP 5.2* 5.4* 5.9*   ALB 1.9* 2.0* 1.9*   GLOB 3.3 3.4 4.0   AGRAT 0.6* 0.6* 0.5*      CBC w/Diff Recent Labs     04/21/22  1155 04/21/22  0420 04/20/22  2252 04/20/22  0939 04/20/22  0259 04/19/22  1845 04/19/22 1845   WBC  --  5.8  --   --  7.1  --  5.8   RBC  --  2.44*  --   --  2.31*  --  2.01*   HGB 7.4* 6.8* 7.0*   < > 6.4*   < > 5.5*   HCT 22.3* 20.9* 22.0*   < > 19.3*   < > 16.8*   PLT  --  135  --   --  126*  --  98*   GRANS  --  72  --   --  84*  --  83*   LYMPH  --  11*  --   --  5*  --  6*   EOS  --  3  --   --  2  -- 2    < > = values in this interval not displayed. Cardiac Enzymes No results for input(s): CPK, CKND1, CALEB in the last 72 hours. No lab exists for component: CKRMB, TROIP   Coagulation Recent Labs     04/21/22  0420 04/20/22  0259 04/19/22  1225 04/19/22  1225   PTP 14.0 14.7   < > 16.6*   INR 1.1 1.2   < > 1.4*   APTT  --   --   --  52.4*    < > = values in this interval not displayed. Lipid Panel No results found for: CHOL, CHOLPOCT, CHOLX, CHLST, CHOLV, 707008, HDL, HDLP, LDL, LDLC, DLDLP, 518598, VLDLC, VLDL, TGLX, TRIGL, TRIGP, TGLPOCT, CHHD, CHHDX   BNP No results for input(s): BNPP in the last 72 hours.    Liver Enzymes Recent Labs     04/21/22  0420   TP 5.2*   ALB 1.9*   AP 53      Thyroid Studies No results found for: T4, T3U, TSH, TSHEXT, TSHEXT     Procedures/imaging: see electronic medical records for all procedures/Xrays and details which were not copied into this note but were reviewed prior to creation of Tierney Gonzalez MD

## 2022-04-21 NOTE — ROUTINE PROCESS
TRANSFER - OUT REPORT:    Verbal report given to Mathew CORDON RN(name) on Gracie Valenzuela  [facility]       Report consisted of patients Situation, Background, Assessment and   Recommendations(SBAR). Information from the following report(s) SBAR, Kardex, Intake/Output and MAR was reviewed with the receiving nurse. Lines:   Peripheral IV 04/19/22 Right;Distal Cephalic (Active)   Site Assessment Clean, dry, & intact 04/21/22 1154   Phlebitis Assessment 0 04/21/22 1154   Infiltration Assessment 0 04/21/22 1154   Dressing Status Clean, dry, & intact 04/21/22 1154   Dressing Type Disk with Chlorhexadine gluconate (CHG); Transparent 04/21/22 1154   Hub Color/Line Status Pink;Flushed;Capped 04/21/22 1154   Action Taken Open ports on tubing capped 04/21/22 1154   Alcohol Cap Used Yes 04/21/22 1154       Peripheral IV 04/21/22 Right;Proximal Basilic (Active)   Site Assessment Clean, dry, & intact 04/21/22 1154   Phlebitis Assessment 0 04/21/22 1154   Infiltration Assessment 0 04/21/22 1154   Dressing Status Clean, dry, & intact 04/21/22 1154   Dressing Type Tape;Transparent 04/21/22 1154   Hub Color/Line Status Green;Capped 04/21/22 1154   Action Taken Open ports on tubing capped 04/21/22 1154   Alcohol Cap Used Yes 04/21/22 1154        Opportunity for questions and clarification was provided.       Patient transported with:   Monitor  Registered Nurse

## 2022-04-21 NOTE — PROGRESS NOTES
Chief Complaint   Patient presents with     Foot Problem     Pt stubbed L big toe x 4 days at her [employer] party.       HPI: Very pleasant 34-year-old female who presents today with pain in her left big toe for 4 days in duration.  She was getting up onto a bus on uneven step and slipped and sustained a contusion on her left great toe.  Review of old records show that she was seen on 12/21/2018 for health maintenance and had no major concerns.  She is noted ecchymosis but no swelling.  She is noticed that the nail on the great toe is painful.    ROS: Positive for ecchymosis.  Negative for swelling.  For bleeding.    SH:    reports that she has never smoked. She has never used smokeless tobacco. She reports that she does not drink alcohol or use drugs.      FH: The Patient's family history includes Cancer in her mother; Diabetes in her mother; No Medical Problems in her brother, brother, brother, brother, brother, brother, brother, father, sister, sister, sister, sister, and sister.     Meds:  [place] currently has no medications in their medication list.    O:  /70   Pulse 68   Resp 16   Wt 171 lb 5 oz (77.7 kg)   SpO2 98%   BMI 31.85 kg/m    No acute distress and vitals stable  Examination of the left lower extremity shows normal distal neurological vascular function of left lower extremity  The left great toe has ecchymosis both in the medial lateral aspect and the nail appears to be slightly loose.  Neuro-moves all 4 extremities and pupils are equal    X-ray- no evidence of fracture displacement and only minimal soft tissue swelling noted.    A/P:   1. Injury of foot  The patient has a contusion of the most likely lose the nail which will grow back.  Offered boot for comfort patient declined.  -Over-the-counter ibuprofen for pain  - XR Foot Left 3 or More VWS                                           [facility]  Pulmonary, Critical Care, and Sleep Medicine    Name: Lizbeth Phoenix MRN: [id number]   : 1995 Hospital: [facility]    Date: 2022  Room: [address] [place] [address] Note                                              Consult requesting physician: Hospitalist    Reason for Consult: acute bleeding PD catheter malfunction, renal failure , severe uremia , acute anemia      IMPRESSION:   · Acute bleeding  · Severe anemia  · End-stage renal disease  · Peritoneal dialysis malfunction  · Hyponatremia   · Hyperkalemia  ·   Patient Active Problem List   Diagnosis Code    Encephalopathy G93.40    Lupus ([address] M32.9    ESRD on dialysis ([address] N18.6, Z99.2    GERD (gastroesophageal reflux disease) K21.9    Hypertension I10    Thyroid disease E07.9    Psychiatric disorder F99    Anemia D64.9    Peritoneal hematoma S36.81XA    Peritoneal dialysis catheter dysfunction (HCC) T85.611A    Acute bleeding R58    Uncontrolled hypertension I10    Lupus nephritis ([address] M32.14    Hemoperitoneum K66.1    Abnormal pelvic ultrasound R93.89           · Code status:  Full code Code      RECOMMENDATIONS:   Respiratory: Stable , history of asthma resume in hospital no wheezing   Currently  on RA  Protecting airway. Keep SPO2 >=92%. HOB 30 degree elevation all the time. Aggressive pulmonary toileting. Aspiration precautions. Incentive spirometry. CVS: Hypertension, ESRD missed HD will resume home medication and HD today  Prn add metoprol already on hydratazine   ID: culure negative stop abx today spoke to ID   PD placed no fever but bleeding high risk abdominal sepsis zosyn   Pd fluid blood send for culture  [facility] so far negative   Sepsis protocol  Sepsis bundle and protocol followed. Follow serial lactic acid, frequent BMP check, fluid resuscitation. Follow cultures. Deescalate antibiotic when appropriate.   Hematology/Oncology:  Hb again drop to 6.8 got 1 unit prnc  Gyn gave provera to stop vaginal bleeding     acute on chronic anemia PD cath ? intraabdominal bleeding  Ct revised also left ovary bleeding? Discussed with both surgical and gyn team possible surgical intervention  Hb 4.9 on admission  PRBC  3 units give now 7.5   FFP 2 units given  Vit k given   Coagulopathy resolve   H/h stat every 6 hrs   INR now normal   Renal: ESRD due to lupus since 2021  Was on HD AV fistual wanted PD second PD cath  Urgent HD yeserday due to severe uremia, fluid overload   GI/: yes  Endocrine: Monitor BS. SSI. Neurology: awake and alert    Pain/Sedation: prn  Skin/Wound:post op wound PD   Electrolytes: Replace electrolytes per ICU electrolyte replacement protocol. Per renal   IVF: PRBC avoid IVF  Nutrition: npo until surgery cleared   Prophylaxis: DVT Prophylaxis: SCD/. GI Prophylaxis: Protonix/yes. Restraints: none    Lines/Tubes: PIV  Other:  Gyn: case discussed with ob/gyn will evaluate today    OTHER: Moderate intraperitoneal free fluid and small amount of intraperitoneal  free air, likely related to peritoneal dialysis, with PD cannula in place coiled  in the right pelvis.      BONES: No acute or aggressive osseous abnormalities identified. _______________     IMPRESSION     1. Cystic/hemorrhagic lesion in the left adnexa, potentially hemorrhagic ovarian  cyst. Please note that pelvic/transvaginal ultrasound is most sensitive to  evaluate the gynecologic structures, including evaluation for ovarian cyst or  torsion if clinically suspected.     2. Moderate amount of intraperitoneal free fluid and small amount of  intraperitoneal free air, which can simply be related to known peritoneal  dialysis.       Will defer respective systems problem management to primary and other respective consultant and follow patient in ICU with primary and other medical team.  Further recommendations will be based on the patient's response to recommended treatment and results of the investigation ordered. Quality Care: PPI, DVT prophylaxis, HOB elevated, Infection control all reviewed and addressed. Care of plan d/w hospitalist team, RN, RT, MDR.  D/w patient and hospitalist (answered all questions to satisfaction). High complexity decision making was performed during the evaluation of this patient at high risk for decompensation with multiple organ involvement. Total critical care time spent rendering care exclusive of procedures/family discussion/coordination of care: 59 minutes. Subjective/History of Present Illness:     Patient is a 32 y.o. female with PMHx significant for SLE, asthma,  end-stage renal disease on peritoneal dialysis, still has fully functional left arm fistula, since last year on the PD, new peritoneal catheter placed last Friday, abdominal pain, weakness since then the procedure. Found to have retroperitoneal bleed hematoma. Patient also has not had dialysis since 4/14 significant uremia. Critical hemoglobin on admission. CT of abdomen and pelvis personally reviewed. Nephrology and general surgery on board. Transfusion to keep hemoglobin above 7. Surgery possibly today or tomorrow as per surgical team.  Critical hb 4.9  additionally left ovarian abnormalities for farther investigation of possible bleeding/tortion. 4/21/2022:  Evaluated in ICU bed 6. Awake and alert. Eating  abd pain resolved  Hb 6.8 got prbc  Appreciate gyn help  Ok to transfer hospitalist awake  I will sign off call me if new issues  I/O last 24 hrs: Intake/Output Summary (Last 24 hours) at 4/21/2022 0948  Last data filed at 4/21/2022 0400  Gross per 24 hour   Intake 695.41 ml   Output 1100 ml   Net -404.59 ml           History taken from patient and EMR    Review of Systems:  A comprehensive review of systems was negative except for that written in the HPI.        Review of Systems:   HEENT: No epistaxis, no nasal drainage, no difficulty in swallowing, no redness in eyes  Respiratory: as above  Cardiovascular: no chest pain, no palpitations, no chronic leg edema, no syncope  Gastrointestinal: yes  abd pain, no vomiting, no diarrhea, no bleeding symptoms  Genitourinary: No urinary symptoms or hematuria  Musculoskeletal: Neg  Neurological: No focal weakness, no seizures, no headaches  Behvioral/Psych: No anxiety, no depression  General : No fever, no chills, no weight loss, no night sweats     Allergies   Allergen Reactions    Banana Itching and Swelling    Clindamycin Diarrhea    Sulfa (Sulfonamide Antibiotics) Hives     Bactrim      Past Medical History:   Diagnosis Date    A-V fistula (HCC)     LEFT SIDE     Anxiety and depression     Asthma     Chronic kidney disease     ESRD- fresinius    Chronic pain     GERD (gastroesophageal reflux disease)     History of blood transfusion 2017, 2020    Hypertension     Lupus ([address]     Psychiatric disorder     Thyroid disease     hypo      Past Surgical History:   Procedure Laterality Date    HX HEENT  2021    oral surgery    HX UROLOGICAL  14778, 2019    biopsies of kidneys    HX VASCULAR ACCESS Left 2020    dialysis shunt (arm)    IR INSERT INTRAPERITONEAL CATH PERM      CO ABDOMEN SURGERY PROC UNLISTED  2015    Drain a boil      Social History     Tobacco Use    Smoking status: Current Every Day Smoker    Smokeless tobacco: Never Used    Tobacco comment: 5 Black and milds daily d/t recent stress    Substance Use Topics    Alcohol use: Not Currently     Comment: ocassionally      Family History   Problem Relation Age of Onset    Kidney Disease Father     Lupus Father       Prior to Admission medications    Medication Sig Start Date End Date Taking? Authorizing Provider   ondansetron (ZOFRAN ODT) 8 mg disintegrating tablet Take 1 Tablet by mouth every eight (8) hours as needed for Nausea or Vomiting.  Indications: prevent nausea and vomiting after surgery 4/15/22   Katiuska Martinez MD   calcitRIOL (ROCALTROL) 0.5 mcg capsule Take 3 Capsules by mouth daily for 30 days. Patient taking differently: Take 1.5 mcg by mouth two (2) times a day. 3/24/22 4/23/22  Bear Tracey MD   amLODIPine (NORVASC) 10 mg tablet Take 1 Tablet by mouth daily (after lunch). Indications: high blood pressure 3/24/22   Bear Tracey MD   bumetanide (BUMEX) 2 mg tablet Take 1 Tablet by mouth daily. 3/24/22   Bear Tracey MD   sevelamer carbonate (RENVELA) 800 mg tab tab Take 1 Tablet by mouth three (3) times daily (with meals) for 30 days. 3/24/22 4/23/22  Bear Tracey MD   sodium bicarbonate 650 mg tablet Take 1 Tablet by mouth two (2) times a day for 30 days. 3/24/22 4/23/22  Bear Tracey MD   calcium carbonate (OS-VIRGILIO) 500 mg calcium (1,250 mg) tablet Take 3 Tablets by mouth Before breakfast, lunch, dinner and at bedtime. 2/22/22   Provider, Historical   ondansetron (ZOFRAN ODT) 8 mg disintegrating tablet Take 1 Tablet by mouth every eight (8) hours as needed for Nausea or Vomiting. Indications: prevent nausea and vomiting after surgery 6/3/21   Lesly Meraz MD   albuterol (PROVENTIL HFA, VENTOLIN HFA, PROAIR HFA) 90 mcg/actuation inhaler Take  by inhalation as needed for Wheezing. Provider, Historical   budesonide (PULMICORT) 0.25 mg/2 mL nbsp by Nebulization route daily (after lunch). Provider, Historical   cetirizine (ZYRTEC) 10 mg tablet Take  by mouth daily (after lunch). Provider, Historical   divalproex ER (Depakote ER) 500 mg ER tablet Take 500 mg by mouth two (2) times a day. Indications: bipolar disorder in remission    Provider, Historical   escitalopram oxalate (Lexapro) 10 mg tablet Take 10 mg by mouth daily. Provider, Historical   famotidine (PEPCID) 20 mg tablet Take 20 mg by mouth nightly. Provider, Historical   ferrous sulfate 324 mg (65 mg iron) tablet Take  by mouth two (2) times daily (with meals). Provider, Historical   fluticasone propion-salmeteroL (Advair Diskus) 250-50 mcg/dose diskus inhaler Take 1 Puff by inhalation every twelve (12) hours. Provider, Historical   fluticasone propionate (FLONASE NA) by Nasal route as needed. Provider, Historical   hydrOXYchloroQUINE (Plaquenil) 200 mg tablet Take 200 mg by mouth nightly. Indications: systemic lupus erythematosus, an autoimmune disease, Lupus    Provider, Historical   labetaloL (NORMODYNE) 200 mg tablet Take 200 mg by mouth three (3) times daily. Indications: high blood pressure    Provider, Historical   levothyroxine (SYNTHROID) 100 mcg tablet Take 100 mcg by mouth Daily (before breakfast). Provider, Historical   pantoprazole (PROTONIX) 40 mg tablet Take 40 mg by mouth two (2) times a day.     Provider, Historical     Current Facility-Administered Medications   Medication Dose Route Frequency    amLODIPine (NORVASC) tablet 10 mg  10 mg Oral PCL    calcium carbonate (OS-VIRGILIO) tablet 1,500 mg [elemental]  1,500 mg Oral AC&HS    escitalopram oxalate (LEXAPRO) tablet 10 mg  10 mg Oral DAILY    ferrous sulfate tablet 325 mg  1 Tablet Oral BID WITH MEALS    sevelamer carbonate (RENVELA) tab 800 mg  800 mg Oral TID WITH MEALS    labetaloL (NORMODYNE) tablet 200 mg  200 mg Oral TID    levothyroxine (SYNTHROID) tablet 100 mcg  100 mcg Oral ACB    hydrOXYchloroQUINE (PLAQUENIL) tablet 200 mg  200 mg Oral QHS    divalproex DR (DEPAKOTE) tablet 500 mg  500 mg Oral BID    calcitRIOL (ROCALTROL) capsule 1.5 mcg  1.5 mcg Oral DAILY    bumetanide (BUMEX) injection 2 mg  2 mg IntraVENous Q12H    piperacillin-tazobactam (ZOSYN) 3.375 g in 0.9% sodium chloride (MBP/ADV) 100 mL MBP  3.375 g IntraVENous Q12H    sodium chloride (NS) flush 5-40 mL  5-40 mL IntraVENous Q8H    famotidine (PEPCID) tablet 20 mg  20 mg Oral DAILY         Objective:   Vital Signs:    Visit Vitals  BP (!) 153/109   Pulse 79   Temp 98.6 °F (37 °C)   Resp 9   Ht 5' 6\" (1.676 m)   Wt 79.4 kg (175 lb 0.7 oz)   LMP 2022 (Approximate)   SpO2 100%   BMI 28.25 kg/m²       O2 Device: None (Room air)       Temp (24hrs), Av.8 °F (37.1 °C), Min:98 °F (36.7 °C), Max:99.1 °F (37.3 °C)       Intake/Output:   Last shift:      No intake/output data recorded. Last 3 shifts: 04/19 1901 - 04/21 0700  In: 1936.7 [P.O.:120; I.V.:575.4]  Out: 4350 [Urine:2350]      Intake/Output Summary (Last 24 hours) at 4/21/2022 0948  Last data filed at 4/21/2022 0400  Gross per 24 hour   Intake 695.41 ml   Output 1100 ml   Net -404.59 ml       Last 3 Recorded Weights in this Encounter    04/19/22 1147 04/21/22 0327   Weight: 78.9 kg (174 lb) 79.4 kg (175 lb 0.7 oz)             No results for input(s): PHI, PHI, POC2, PCO2I, PO2, PO2I, HCO3, HCO3I, FIO2, FIO2I in the last 72 hours. Physical Exam:     Impresson general : Alert, Awake, in moderate distress abdominal dyscontrol   Head:   Normocephalic,atraumatic. ENT:   EOM  no scleral icterus, no pallor, no cyanosis. Nose:   No sinus tenderness  Throat:  Oropharynx ,mucosa, and tongue normal. No oral thrush. Neck:   Supple, symmetric. Lymph nodes. Trachea is midline  Lung: Moderate air entry bilateral equal No rales. No rhonchi. No wheezing. No stridors. No prolongded expiration. No accessory muscle use. Heart:   Regular  rate & rhythm. S1 S2 present. No murmur. No JVD. Abdomen:  Distended /PD cath right upper quadrant with small drainage  , ascites, decresed BS. No masses. liver and spleen  Extremities:  2 plus pedal edema. No cyanosis. No clubbing. Pulses: 2+ and symmetric in DP. Capillary refill: normal  Lymphatic:  neck and supraclavicular    Musculoskeletal: No joint swelling. No tenderness. Skin:   Lesion Color, texture, turgor normal. No rashes or lesions.        Data:       Recent Results (from the past 24 hour(s))   CULTURE, BODY FLUID W GRAM STAIN    Collection Time: 04/20/22 10:17 AM    Specimen: Peritoneal Fluid   Result Value Ref Range    Special Requests: NO SPECIAL REQUESTS      GRAM STAIN 2+ WBCS SEEN      GRAM STAIN NO ORGANISMS SEEN      Culture result: PENDING    CELL COUNT, BODY FLUID    Collection Time: 04/20/22 10:17 AM   Result Value Ref Range    BODY FLUID TYPE PERITONEAL CAVITY      FLUID COLOR RED      FLUID APPEARANCE TURBID      FLUID RBC CT. (A) NRRE /cu mm     SPECIMEN GROSSLY BLOODY. RBC'S TOO NUMEROUS TO COUNT. FLUID NUCLEATED CELLS 2,573 (A) NRRE /cu mm   HGB & HCT    Collection Time: 04/20/22  3:45 PM   Result Value Ref Range    HGB 7.0 (L) 12.0 - 16.0 g/dL    HCT 21.2 (L) 35.0 - 45.0 %   HGB & HCT    Collection Time: 04/20/22 10:52 PM   Result Value Ref Range    HGB 7.0 (L) 12.0 - 16.0 g/dL    HCT 22.0 (L) 35.0 - 50.0 %   METABOLIC PANEL, COMPREHENSIVE    Collection Time: 04/21/22  4:20 AM   Result Value Ref Range    Sodium 142 136 - 145 mmol/L    Potassium 4.6 3.5 - 5.5 mmol/L    Chloride 108 100 - 111 mmol/L    CO2 28 21 - 32 mmol/L    Anion gap 6 3.0 - 18 mmol/L    Glucose 92 74 - 99 mg/dL    BUN 56 (H) 7.0 - 18 MG/DL    Creatinine 14.20 (H) 0.6 - 1.3 MG/DL    BUN/Creatinine ratio 4 (L) 12 - 20      GFR est AA 4 (L) >60 ml/min/1.73m2    GFR est non-AA 3 (L) >60 ml/min/1.73m2    Calcium 7.8 (L) 8.5 - 10.1 MG/DL    Bilirubin, total 0.5 0.2 - 1.0 MG/DL    ALT (SGPT) <6 (L) 13 - 56 U/L    AST (SGOT) 9 (L) 10 - 38 U/L    Alk.  phosphatase 53 45 - 117 U/L    Protein, total 5.2 (L) 6.4 - 8.2 g/dL    Albumin 1.9 (L) 3.4 - 5.0 g/dL    Globulin 3.3 2.0 - 4.0 g/dL    A-G Ratio 0.6 (L) 0.8 - 1.7     CBC WITH AUTOMATED DIFF    Collection Time: 04/21/22  4:20 AM   Result Value Ref Range    WBC 5.8 4.6 - 13.2 K/uL    RBC 2.44 (L) 4.20 - 5.30 M/uL    HGB 6.8 (L) 12.0 - 16.0 g/dL    HCT 20.9 (L) 35.0 - 45.0 %    MCV 85.7 78.0 - 100.0 FL    MCH 27.9 24.0 - 34.0 PG    MCHC 32.5 31.0 - 37.0 g/dL    RDW 14.9 (H) 11.6 - 14.5 %    PLATELET 038 572 - 564 K/uL    MPV 12.8 (H) 9.2 - 11.8 FL    NRBC 0.0 0  WBC    ABSOLUTE NRBC 0.00 0.00 - 0.01 K/uL    NEUTROPHILS 72 40 - 73 %    LYMPHOCYTES 11 (L) 21 - 52 %    MONOCYTES 13 (H) 3 - 10 %    EOSINOPHILS 3 0 - 5 %    BASOPHILS 0 0 - 2 % IMMATURE GRANULOCYTES 1 (H) 0.0 - 0.5 %    ABS. NEUTROPHILS 4.2 1.8 - 8.0 K/UL    ABS. LYMPHOCYTES 0.7 (L) 0.9 - 3.6 K/UL    ABS. MONOCYTES 0.8 0.05 - 1.2 K/UL    ABS. EOSINOPHILS 0.2 0.0 - 0.4 K/UL    ABS. BASOPHILS 0.0 0.0 - 0.1 K/UL    ABS. IMM. GRANS. 0.1 (H) 0.00 - 0.04 K/UL    DF AUTOMATED     PROTHROMBIN TIME + INR    Collection Time: 04/21/22  4:20 AM   Result Value Ref Range    Prothrombin time 14.0 11.5 - 15.2 sec    INR 1.1 0.8 - 1.2     RBC, ALLOCATE    Collection Time: 04/21/22  6:45 AM   Result Value Ref Range    HISTORY CHECKED? Historical check performed    RBC, ALLOCATE    Collection Time: 04/21/22  7:45 AM   Result Value Ref Range    HISTORY CHECKED? Historical check performed          Chemistry Recent Labs     04/21/22  0420 04/20/22  0259 04/19/22  1845   GLU 92 80 86    138 132*   K 4.6 4.1 4.0    105 101   CO2 28 26 25   BUN 56* 48* 72*   CREA 14.20* 12.30* 14.90*   CA 7.8* 8.1* 8.2*   AGAP 6 7 6   BUCR 4* 4* 5*   AP 53 62 59   TP 5.2* 5.4* 5.9*   ALB 1.9* 2.0* 1.9*   GLOB 3.3 3.4 4.0   AGRAT 0.6* 0.6* 0.5*        Lactic Acid Lactic acid   Date Value Ref Range Status   04/19/2022 0.5 0.4 - 2.0 MMOL/L Final     Recent Labs     04/19/22  1225   LAC 0.5        Liver Enzymes Protein, total   Date Value Ref Range Status   04/21/2022 5.2 (L) 6.4 - 8.2 g/dL Final     Albumin   Date Value Ref Range Status   04/21/2022 1.9 (L) 3.4 - 5.0 g/dL Final     Globulin   Date Value Ref Range Status   04/21/2022 3.3 2.0 - 4.0 g/dL Final     A-G Ratio   Date Value Ref Range Status   04/21/2022 0.6 (L) 0.8 - 1.7   Final     Alk.  phosphatase   Date Value Ref Range Status   04/21/2022 53 45 - 117 U/L Final     Recent Labs     04/21/22  0420 04/20/22  0259 04/19/22  1845   TP 5.2* 5.4* 5.9*   ALB 1.9* 2.0* 1.9*   GLOB 3.3 3.4 4.0   AGRAT 0.6* 0.6* 0.5*   AP 53 62 59        CBC w/Diff Recent Labs     04/21/22  0420 04/20/22  2252 04/20/22  1545 04/20/22  0939 04/20/22  0259 04/19/22  1845 04/19/22  1845 WBC 5.8  --   --   --  7.1  --  5.8   RBC 2.44*  --   --   --  2.31*  --  2.01*   HGB 6.8* 7.0* 7.0*   < > 6.4*   < > 5.5*   HCT 20.9* 22.0* 21.2*   < > 19.3*   < > 16.8*     --   --   --  126*  --  98*   GRANS 72  --   --   --  84*  --  83*   LYMPH 11*  --   --   --  5*  --  6*   EOS 3  --   --   --  2  --  2    < > = values in this interval not displayed. Cardiac Enzymes No results found for: CPK, CK, CKMMB, CKMB, RCK3, CKMBT, CKNDX, CKND1, CALEB, TROPT, TROIQ, MARTHA, TROPT, TNIPOC, BNP, BNPP     BNP No results found for: BNP, BNPP, XBNPT     Coagulation Recent Labs     04/21/22  0420 04/20/22  0259 04/19/22  1225   PTP 14.0 14.7 16.6*   INR 1.1 1.2 1.4*   APTT  --   --  52.4*         Thyroid  No results found for: T4, T3U, TSH, TSHEXT, TSHEXT    No results found for: T4     Urinalysis Lab Results   Component Value Date/Time    Color YELLOW 04/19/2022 05:59 PM    Appearance CLEAR 04/19/2022 05:59 PM    Specific gravity 1.006 04/19/2022 05:59 PM    pH (UA) 7.0 04/19/2022 05:59 PM    Protein 100 (A) 04/19/2022 05:59 PM    Glucose Negative 04/19/2022 05:59 PM    Ketone Negative 04/19/2022 05:59 PM    Bilirubin Negative 04/19/2022 05:59 PM    Urobilinogen 0.2 04/19/2022 05:59 PM    Nitrites Negative 04/19/2022 05:59 PM    Leukocyte Esterase TRACE (A) 04/19/2022 05:59 PM    Epithelial cells 2+ 04/19/2022 05:59 PM    Bacteria FEW (A) 04/19/2022 05:59 PM    WBC 0 to 3 04/19/2022 05:59 PM    RBC 1 to 3 04/19/2022 05:59 PM        Micro  Recent Labs     04/20/22  1017 04/19/22  1454 04/19/22  1225   CULT PENDING NO GROWTH 2 DAYS NO GROWTH 2 DAYS     Recent Labs     04/20/22  1017 04/19/22  1454 04/19/22  1225   CULT PENDING NO GROWTH 2 DAYS NO GROWTH 2 DAYS          Culture data during this hospitalization.    All Micro Results     Procedure Component Value Units Date/Time    CULTURE, BLOOD [[id number]] Collected: 04/19/22 1225    Order Status: Completed Specimen: Blood Updated: 04/21/22 6322     Special Requests: NO SPECIAL REQUESTS        Culture result: NO GROWTH 2 DAYS       CULTURE, BLOOD [[id number]] Collected: 04/19/22 1454    Order Status: Completed Specimen: Blood Updated: 04/21/22 0812     Special Requests: NO SPECIAL REQUESTS        Culture result: NO GROWTH 2 DAYS       CULTURE, BODY FLUID ZE[address] [[id number]] Collected: 04/20/22 1017    Order Status: Completed Specimen: Peritoneal Fluid Updated: 04/20/22 1549     Special Requests: NO SPECIAL REQUESTS        GRAM STAIN 2+ WBCS SEEN         NO ORGANISMS SEEN        Culture result: PENDING               PFT       Ultrasound       LE Doppler       ECHO       Images report reviewed by me:  CT (Most Recent) (CT chest reviewed by me) Results from [place] encounter on 04/19/22    CT ABD PELV WO CONT    Narrative  EXAM: CT of the abdomen and pelvis. HISTORY:  -Provided with order: abdominal pain, blood return from peritoneal dialysis port  -Additional: None    COMPARISON: 03/22/22    TECHNIQUE: Axial imaging was obtained through the abdomen and pelvis without  oral or intravenous contrast.  Please note that lack of oral contrast limits the  sensitivity for detection of bowel abnormalities. Lack of IV contrast limits  solid organ assessment and enhancement characteristics. Multiplanar reformats were generated. One or more dose reduction techniques were used on this CT: automated exposure  control, adjustment of the mAs and/or kVp according to patient size, and  iterative reconstruction techniques. The specific techniques used on this CT  exam have been documented in the patient's electronic medical record. Digital Imaging and Communications in Medicine (DICOM) format image data are  available to nonaffiliated external healthcare facilities or entities on a  secure, media free, reciprocally searchable basis with patient authorization for  at least a 12-month period after this study. FINDINGS:    LOWER CHEST: Unremarkable.     LIVER: Unremarkable. BILIARY: Gallbladder: No calcified gallstones or surrounding inflammatory  changes identified. No biliary ductal dilation. SPLEEN: Unremarkable. PANCREAS: Unremarkable. ADRENALS: Unremarkable. KIDNEYS: Unremarkable. LYMPH NODES: No enlarged lymph nodes. VASCULATURE: limited in assessment without IV contrast.  Atherosclerotic aorta  without aneurysm. Unremarkable. GASTROINTESTINAL TRACT:  Esophagus/stomach/duodenum: unremarkable. Appendix: .  Small/Large bowel: No bowel dilation or wall thickening. PELVIC ORGANS:  Bladder: Unremarkable. Estimated 5.4 x 5.5 x 5.1 cm spherical structure with  layering hyperdensity (44 HU) in the left adnexa. Otherwise unremarkable    OTHER: Moderate intraperitoneal free fluid and small amount of intraperitoneal  free air, likely related to peritoneal dialysis, with PD cannula in place coiled  in the right pelvis. BONES: No acute or aggressive osseous abnormalities identified. _______________    Impression  1. Cystic/hemorrhagic lesion in the left adnexa, potentially hemorrhagic ovarian  cyst. Please note that pelvic/transvaginal ultrasound is most sensitive to  evaluate the gynecologic structures, including evaluation for ovarian cyst or  torsion if clinically suspected. 2. Moderate amount of intraperitoneal free fluid and small amount of  intraperitoneal free air, which can simply be related to known peritoneal  dialysis. CXR reviewed by me:  XR (Most Recent). CXR  reviewed by me and compared with previous CXR Results from Hospital Encounter encounter on 04/19/22    XR ABD (KUB)    Narrative  HISTORY:  -From Provider:peritoneal dialysis shunt problem  -Additional: None    Technique: Single portable frontal abdominal radiograph. Comparison study: None. Findings: Catheter tubing overlies the right abdomen, tip coiled in the right  lower quadrant, intact in visualized extent. .    Lung bases: Not included in field-of-view imaging. Bowel gas pattern: unremarkable in visualized extent. Relative paucity of bowel  in the pelvis and hazy increased density over the abdomen/pelvis may reflect  peritoneal dialysate. Other/osseous: Unremarkable. Impression  1. Right-sided catheter, presumed corresponding to known peritoneal dialysis  catheter. Maurice Murcia MD  4/21/2022

## 2022-04-21 NOTE — PROGRESS NOTES
Assumed care of pt from [facility]. Spoke with  this morning patient ok to transfer upstairs. 0308: Pt extremely up set this morning because dietary did not send her enough food. Pt states she wants to go home. Pt states nobody cares about her. This RN addressed all concerns. 1123: Pt upset stating\" if we don't give her something for anxiety she is going to walk out\". This RN gave her her psych medication pt stated this is not going to help with my anxiety. 1146: Dr. Pari Foster was called for patient anxiety level and threats to leave ICU. Shayy Jo Received order for one time dose of ativan. 1206: Pt was medicated and is now a little more relaxed.

## 2022-04-21 NOTE — PROGRESS NOTES
RENAL CONSULT PROGRESS NOTE   2022    Patient:  Jessica Rodriguez  :  1995  Gender:  female  MRN #:  [id number]    Reason for Consult: ESRD for dialysis related care and significant anemia. Assessment:    Jessica Rodriguez is a 32y.o. year old female with SLE , lupus nephritis leading to ESRD now on PD,  Hypertension, Anemia of CKD , secondary Hyperparathyroidism was sent from dialysis unit due to blood in peritoneal    fluid . She had diagnostic laparoscopy last Friday and new PD catheter was placed. She has abdominal pain and hemoglobin is 4.9 mg/dl most likely due to  intraperitoneal bleed. ESRD on PD   Hypertension   Post hemorrhagic anemia on top of anemia of CKD   Secondary Hyperparathyroidism        Subjective/Overnight events:   - she says she is doing good and wants to go home   - BP is still high but better   - Abdominal pain intermittently and mild distension       Plan:      ESRD - Patient examined and labs reviewed this  jairo Serrano  Will plan for dialysis , UF target is 3.5 kg which should help to control BP    As long she is in house will plan hemodialysis on TTS  Will resume PD when bleeding resolves and appropriate to use PD catheter as outpatient   As Dr Gloria Sandhu mentioned hemoperitoneum can be drained from PD catheter today and needs PD catheter flush  to prevent clot/blockage    Intake and output charting   Dose all meds for ESRD      Anemia - she has anemia of CKD, drop in hemoglobin now most likely intraperitoneal bleed, per CT abdomen - hemorrhagic ovarian cyst bleeding  Appreciate Gyn assistance and recommendation   Appreciate surgery recommendations and evaluation  Agree with transfusion again today   Transfusion prn to keep level above 7 gram/dl       Hypertension:  Uncontrolled  Continue alvodine, bumex, labetalol   Will start losartan   Dialysis today     Secondary Hyperparathyroidism: resume calcitriol 1.5 mcg daily  Hyperphosphatemia- she takes sevelamer 800 mg TID Hypocalcemia- she has h/o persistent Hypocalcemia takes calcium carbonate 1500 mg QID  at home     Objective:    Visit Vitals  BP (!) 159/108   Pulse 79   Temp 98.7 °F (37.1 °C)   Resp 15   Ht 5' 6\" (1.676 m)   Wt 79.4 kg (175 lb 0.7 oz)   LMP 03/28/2022 (Approximate)   SpO2 100%   BMI 28.25 kg/m²       Physical Exam:    Pt awake,  alert  HEENT: No neck swelling  Lung: clear to auscultation, no crackles and wheeze  Heart: s1s2 regular ,no rubs or murmur  Abdomen: soft,  Mildly distended, , tenderness on around PD cath site . Ext:  Has ankle edema    CNS- Oriented to time , place and person     Laboratory Data:    Lab Results   Component Value Date    BUN 56 (H) 04/21/2022    BUN 48 (H) 04/20/2022    BUN 72 (H) 04/19/2022     04/21/2022     04/20/2022     (L) 04/19/2022    CO2 28 04/21/2022    CO2 26 04/20/2022    CO2 25 04/19/2022     Lab Results   Component Value Date    WBC 5.8 04/21/2022    HGB 7.4 (L) 04/21/2022    HCT 22.3 (L) 04/21/2022     No components found for: CALCIUM, PHOSPHORUS, MAGNESIUM  No results found for: HDL  No results found for: SPECIMENTYP, TURBIDITY, UGLU    Imaging Reveiwed:    KUB x ray - reviewed     CT abdomen:     1. Cystic/hemorrhagic lesion in the left adnexa, potentially hemorrhagic ovarian  cyst. Please note that pelvic/transvaginal ultrasound is most sensitive to  evaluate the gynecologic structures, including evaluation for ovarian cyst or  torsion if clinically suspected.     2. Moderate amount of intraperitoneal free fluid and small amount of  intraperitoneal free air, which can simply be related to known peritoneal  dialysis.          Diandra Bergman MD, MD  [facility].- Nephrology

## 2022-04-21 NOTE — PROGRESS NOTES
[facility]  (A Division of [address] [place] [address])                                                                                                                      Msaha Macias MD  Office #: - Option [phone]  Fax #: [phone]     Date of Admission: 4/19/2022Date of Note: 4/21/2022  Reason for referral: Antibiotic management of possible abd infection requested by Dr Dimas Marks. Dr Andrew Dinh will cover Friday, April 22, 2022. Please call  for questions/ consults by phone or via . Thanks. Current Antimicrobials:    Prior Antimicrobials:  Zosyn 4/19 to date    Immunosuppressive drugs: NA Zosyn -3/22  Cefazolin 4/15/22-- alexis-op       Assessment- ID related:  --------------------------------------------------------------------------      · Peritoneal hematoma due to cystic/adenxal rupture or torsionl lesion on CT  --Grossly bloody peritoneal fluid sent for analysis and culture: 4/20/2020  · S/P diagnositc lap, lysis of adhesion and removal of L PD cath for dysfunction, and new PD placement R 04/15/2022  · Anemia 2/2 above-- post transfusion    Other Medical Issues- Mx per respective team:    ESRD on PD  Hypertension       Recommendation for ID issues I am following:  ------------------------------------------------------------------------------  DW Dr Dimas Marks, RN    Clinical picture not impressive for peritonitis-- intervening for hemorragic peritonitis per surgical teams    Peritoneal fluid: NGSF    Can DC all abx from ID side. FU fluid culture until final-      Will sign off. Please re-consult as needed. Subjective:  Pt seen at bedside-- sitting up, and eating breakfast  Denies F/C/R/or increasing abd pain  Getter another PRBC--for HB of 6.8  No surgical plans in place    Notes and labs reviewed. Imaging reports reviewed     HPI:  Yue Johnson is a 32 y.o.  BLACK/ with PMH of ESRD on PD, recent admission and evaluation for dysfunctional PD cath in March 2022, and underwent diagnositic lap and new PD placement on 4/15/22. She was admitted due to blood form PD during dialysis and abdominal pain on 4/19/22. She had no high fever or chills or leucocytosis. Found to have HB of 4.9 on admission. She received 3 PRBC. CT A/P was abnormal for adnexal/cystic lesion on left adenxa and US of pelvis showed complex and hemorragic L adenxal lesion. She is currently on Zosyn. At time of exam, reports she is weak and hungry. No severe abd pain. Diet resumption pending surgical teams plan per nursing. Denies N/V. No chills.         Active Hospital Problems    Diagnosis Date Noted    Anemia 04/19/2022    Peritoneal hematoma 04/19/2022    Peritoneal dialysis catheter dysfunction ([address] 04/19/2022    Acute bleeding 04/19/2022    Uncontrolled hypertension 04/19/2022    Lupus nephritis ([address] 04/19/2022    Hemoperitoneum 04/19/2022    Abnormal pelvic ultrasound 04/19/2022     Past Medical History:   Diagnosis Date    A-V fistula (HCC)     LEFT SIDE     Anxiety and depression     Asthma     Chronic kidney disease     ESRD- fresinius    Chronic pain     GERD (gastroesophageal reflux disease)     History of blood transfusion 2017, 2020    Hypertension     Lupus ([address]     Psychiatric disorder     Thyroid disease     hypo     Past Surgical History:   Procedure Laterality Date    HX HEENT  2021    oral surgery    HX UROLOGICAL  73930, 2019    biopsies of kidneys    HX VASCULAR ACCESS Left 2020    dialysis shunt (arm)    IR INSERT INTRAPERITONEAL CATH PERM      NE ABDOMEN SURGERY PROC UNLISTED  2015    Drain a boil     Family History   Problem Relation Age of Onset    Kidney Disease Father     Lupus Father      Social History     Socioeconomic History    Marital status: SINGLE     Spouse name: Not on file    Number of children: Not on file    Years of education: Not on file    Highest education level: Not on file   Occupational History    Not on file   Tobacco Use    Smoking status: Current Every Day Smoker    Smokeless tobacco: Never Used    Tobacco comment: 5 Black and milds daily d/t recent stress    Vaping Use    Vaping Use: Never used   Substance and Sexual Activity    Alcohol use: Not Currently     Comment: ocassionally    Drug use: Yes     Types: Marijuana     Comment: Instructed not to smoke 24 hours prior to [employer] Sexual activity: Not on file   Other Topics Concern     Service Not Asked    Blood Transfusions Not Asked    Caffeine Concern Not Asked    Occupational Exposure Not Asked    Hobby Hazards Not Asked    Sleep Concern Not Asked    Stress Concern Not Asked    Weight Concern Not Asked    Special Diet Not Asked    Back Care Not Asked    Exercise Not Asked    Bike Helmet Not Asked   [address] [facility] [facility] Not Asked    Self-Exams Not Asked   Social History Narrative    Not on file     Social Determinants of Health     Financial Resource Strain:     Difficulty of Paying Living Expenses: Not on file   Food Insecurity:     Worried About Running Out of Food in the Last Year: Not on file    Krista of Food in the Last Year: Not on file   Transportation Needs:     Lack of Transportation (Medical): Not on file    Lack of Transportation (Non-Medical):  Not on file   Physical Activity:     Days of Exercise per Week: Not on file    Minutes of Exercise per Session: Not on file   Stress:     Feeling of Stress : Not on file   Social Connections:     Frequency of Communication with Friends and Family: Not on file    Frequency of Social Gatherings with Friends and Family: Not on file    Attends Yarsani Services: Not on file    Active Member of Clubs or Organizations: Not on file    Attends Club or Organization Meetings: Not on file    Marital Status: Not on file   Intimate Partner Violence:     Fear of Current or Ex-Partner: Not on file    Emotionally Abused: Not on file    Physically Abused: Not on file  Sexually Abused: Not on file   Housing Stability:     Unable to Pay for Housing in the Last Year: Not on file    Number of Places Lived in the Last Year: Not on file    Unstable Housing in the Last Year: Not on file       Allergies:  Banana, Clindamycin, and Sulfa (sulfonamide antibiotics)     Medications:  Current Facility-Administered Medications   Medication Dose Route Frequency    0.9% sodium chloride infusion 250 mL  250 mL IntraVENous PRN    0.9% sodium chloride infusion 250 mL  250 mL IntraVENous PRN    0.9% sodium chloride infusion 250 mL  250 mL IntraVENous PRN    metoprolol (LOPRESSOR) injection 2.5 mg  2.5 mg IntraVENous Q6H PRN    amLODIPine (NORVASC) tablet 10 mg  10 mg Oral PCL    calcium carbonate (OS-VIRGILIO) tablet 1,500 mg [elemental]  1,500 mg Oral AC&HS    escitalopram oxalate (LEXAPRO) tablet 10 mg  10 mg Oral DAILY    ferrous sulfate tablet 325 mg  1 Tablet Oral BID WITH MEALS    sevelamer carbonate (RENVELA) tab 800 mg  800 mg Oral TID WITH MEALS    labetaloL (NORMODYNE) tablet 200 mg  200 mg Oral TID    levothyroxine (SYNTHROID) tablet 100 mcg  100 mcg Oral ACB    hydrOXYchloroQUINE (PLAQUENIL) tablet 200 mg  200 mg Oral QHS    divalproex DR (DEPAKOTE) tablet 500 mg  500 mg Oral BID    albuterol (PROVENTIL HFA, VENTOLIN HFA, PROAIR HFA) inhaler 2 Puff  2 Puff Inhalation Q4H PRN    calcitRIOL (ROCALTROL) capsule 1.5 mcg  1.5 mcg Oral DAILY    hydrALAZINE (APRESOLINE) 20 mg/mL injection 20 mg  20 mg IntraVENous Q6H PRN    diphenhydrAMINE (BENADRYL) injection 12.5 mg  12.5 mg IntraVENous Q6H PRN    0.9% sodium chloride infusion 250 mL  250 mL IntraVENous PRN    bumetanide (BUMEX) injection 2 mg  2 mg IntraVENous Q12H    piperacillin-tazobactam (ZOSYN) 3.375 g in 0.9% sodium chloride (MBP/ADV) 100 mL MBP  3.375 g IntraVENous Q12H    0.9% sodium chloride infusion 250 mL  250 mL IntraVENous PRN    sodium chloride (NS) flush 5-40 mL  5-40 mL IntraVENous Q8H    sodium chloride (NS) flush 5-40 mL  5-40 mL IntraVENous PRN    acetaminophen (TYLENOL) tablet 650 mg  650 mg Oral Q6H PRN    Or    acetaminophen (TYLENOL) suppository 650 mg  650 mg Rectal Q6H PRN    polyethylene glycol (MIRALAX) packet 17 g  17 g Oral DAILY PRN    ondansetron (ZOFRAN ODT) tablet 4 mg  4 mg Oral Q8H PRN    Or    ondansetron (ZOFRAN) injection 4 mg  4 mg IntraVENous Q6H PRN    famotidine (PEPCID) tablet 20 mg  20 mg Oral DAILY    0.9% sodium chloride infusion 250 mL  250 mL IntraVENous PRN    oxyCODONE IR (ROXICODONE) tablet 10 mg  10 mg Oral Q6H PRN        ROS:  Pertinent items are noted in the History of Present Illness. Physical Exam:    Temp (24hrs), Av.8 °F (37.1 °C), Min:98 °F (36.7 °C), Max:99.1 °F (37.3 °C)    Visit Vitals  BP (!) 153/109   Pulse 79   Temp 98.6 °F (37 °C)   Resp 9   Ht 5' 6\" (1.676 m)   Wt 79.4 kg (175 lb 0.7 oz)   LMP 2022 (Approximate)   SpO2 100%   BMI 28.25 kg/m²          GEN: WDWN, not in resp distress. HEENT: Unicteric. EOMI intact  CHEST: Non laboured breathing  ABd: PD site not seen-- sitting up  NAYELY: Deferred  EXT: No apparent swelling or redness on UE/LE joints. Skin: Dry and intact. No rash, no redness. CNS: A, OX3. Moves all extremity. CN grossly ok.       Microbiology  All Micro Results     Procedure Component Value Units Date/Time    CULTURE, BLOOD [[id number]] Collected: 22 1225    Order Status: Completed Specimen: Blood Updated: 22     Special Requests: NO SPECIAL REQUESTS        Culture result: NO GROWTH 2 DAYS       CULTURE, BLOOD [[id number]] Collected: 22 1454    Order Status: Completed Specimen: Blood Updated: 22     Special Requests: NO SPECIAL REQUESTS        Culture result: NO GROWTH 2 DAYS       CULTURE, BODY FLUID W Dorrine Rumps [[id number]] Collected: 22 1017    Order Status: Completed Specimen: Peritoneal Fluid Updated: 22 1549     Special Requests: NO SPECIAL REQUESTS        Adonis Zurita STAIN 2+ WBCS SEEN         NO ORGANISMS SEEN        Culture result: PENDING               Lines / Catheters:  Lab results:    Chemistry  Recent Labs     04/21/22 0420 04/20/22  0259 04/19/22  1845   GLU 92 80 86    138 132*   K 4.6 4.1 4.0    105 101   CO2 28 26 25   BUN 56* 48* 72*   CREA 14.20* 12.30* 14.90*   CA 7.8* 8.1* 8.2*   AGAP 6 7 6   BUCR 4* 4* 5*   AP 53 62 59   TP 5.2* 5.4* 5.9*   ALB 1.9* 2.0* 1.9*   GLOB 3.3 3.4 4.0   AGRAT 0.6* 0.6* 0.5*       CBC w/ Diff  Recent Labs     04/21/22 0420 04/20/22 2252 04/20/22  1545 04/20/22  0939 04/20/22 0259 04/19/22  1845 04/19/22  1845   WBC 5.8  --   --   --  7.1  --  5.8   RBC 2.44*  --   --   --  2.31*  --  2.01*   HGB 6.8* 7.0* 7.0*   < > 6.4*   < > 5.5*   HCT 20.9* 22.0* 21.2*   < > 19.3*   < > 16.8*     --   --   --  126*  --  98*   GRANS 72  --   --   --  84*  --  83*   LYMPH 11*  --   --   --  5*  --  6*   EOS 3  --   --   --  2  --  2    < > = values in this interval not displayed. Imaging: report reviewed and as posted by radiologist     CT AP without contrast:    1. Cystic/hemorrhagic lesion in the left adnexa, potentially hemorrhagic ovarian  cyst. Please note that pelvic/transvaginal ultrasound is most sensitive to  evaluate the gynecologic structures, including evaluation for ovarian cyst or  torsion if clinically suspected.     2. Moderate amount of intraperitoneal free fluid and small amount of  intraperitoneal free air, which can simply be related to known peritoneal  dialysis.

## 2022-04-21 NOTE — PROGRESS NOTES
Pt states decreased vaginal bleeding since depo injection  Is currently receiving blood  abd -nt  menstral bleeding decreased now which certainly will help her current anemia,will see pt after discharge in office.  Will follow with you till d/c home

## 2022-04-22 VITALS
WEIGHT: 186 LBS | DIASTOLIC BLOOD PRESSURE: 92 MMHG | TEMPERATURE: 99.4 F | SYSTOLIC BLOOD PRESSURE: 165 MMHG | OXYGEN SATURATION: 100 % | HEART RATE: 93 BPM | BODY MASS INDEX: 29.89 KG/M2 | HEIGHT: 66 IN | RESPIRATION RATE: 18 BRPM

## 2022-04-22 LAB
ALBUMIN SERPL-MCNC: 2.1 G/DL (ref 3.4–5)
ALBUMIN/GLOB SERPL: 0.6 {RATIO} (ref 0.8–1.7)
ALP SERPL-CCNC: 54 U/L (ref 45–117)
ALT SERPL-CCNC: <6 U/L (ref 13–56)
ANION GAP SERPL CALC-SCNC: 5 MMOL/L (ref 3–18)
AST SERPL-CCNC: 10 U/L (ref 10–38)
ATRIAL RATE: 82 BPM
BASOPHILS # BLD: 0 K/UL (ref 0–0.1)
BASOPHILS NFR BLD: 0 % (ref 0–2)
BILIRUB SERPL-MCNC: 0.5 MG/DL (ref 0.2–1)
BUN SERPL-MCNC: 30 MG/DL (ref 7–18)
BUN/CREAT SERPL: 3 (ref 12–20)
CALCIUM SERPL-MCNC: 8.3 MG/DL (ref 8.5–10.1)
CALCULATED P AXIS, ECG09: 70 DEGREES
CALCULATED R AXIS, ECG10: 61 DEGREES
CALCULATED T AXIS, ECG11: 56 DEGREES
CHLORIDE SERPL-SCNC: 106 MMOL/L (ref 100–111)
CO2 SERPL-SCNC: 30 MMOL/L (ref 21–32)
CREAT SERPL-MCNC: 8.89 MG/DL (ref 0.6–1.3)
DIAGNOSIS, 93000: NORMAL
DIFFERENTIAL METHOD BLD: ABNORMAL
EOSINOPHIL # BLD: 0.3 K/UL (ref 0–0.4)
EOSINOPHIL NFR BLD: 5 % (ref 0–5)
ERYTHROCYTE [DISTWIDTH] IN BLOOD BY AUTOMATED COUNT: 14.7 % (ref 11.6–14.5)
GLOBULIN SER CALC-MCNC: 3.7 G/DL (ref 2–4)
GLUCOSE SERPL-MCNC: 89 MG/DL (ref 74–99)
HCT VFR BLD AUTO: 24.7 % (ref 35–45)
HGB BLD-MCNC: 7.9 G/DL (ref 12–16)
IMM GRANULOCYTES # BLD AUTO: 0 K/UL (ref 0–0.04)
IMM GRANULOCYTES NFR BLD AUTO: 1 % (ref 0–0.5)
LYMPHOCYTES # BLD: 0.8 K/UL (ref 0.9–3.6)
LYMPHOCYTES NFR BLD: 13 % (ref 21–52)
MCH RBC QN AUTO: 27.9 PG (ref 24–34)
MCHC RBC AUTO-ENTMCNC: 32 G/DL (ref 31–37)
MCV RBC AUTO: 87.3 FL (ref 78–100)
MONOCYTES # BLD: 0.8 K/UL (ref 0.05–1.2)
MONOCYTES NFR BLD: 12 % (ref 3–10)
NEUTS SEG # BLD: 4.7 K/UL (ref 1.8–8)
NEUTS SEG NFR BLD: 70 % (ref 40–73)
NRBC # BLD: 0 K/UL (ref 0–0.01)
NRBC BLD-RTO: 0 PER 100 WBC
P-R INTERVAL, ECG05: 172 MS
PLATELET # BLD AUTO: 146 K/UL (ref 135–420)
PMV BLD AUTO: 12.4 FL (ref 9.2–11.8)
POTASSIUM SERPL-SCNC: 4.3 MMOL/L (ref 3.5–5.5)
PROT SERPL-MCNC: 5.8 G/DL (ref 6.4–8.2)
Q-T INTERVAL, ECG07: 398 MS
QRS DURATION, ECG06: 88 MS
QTC CALCULATION (BEZET), ECG08: 464 MS
RBC # BLD AUTO: 2.83 M/UL (ref 4.2–5.3)
SODIUM SERPL-SCNC: 141 MMOL/L (ref 136–145)
VENTRICULAR RATE, ECG03: 82 BPM
WBC # BLD AUTO: 6.7 K/UL (ref 4.6–13.2)

## 2022-04-22 PROCEDURE — 74011250637 HC RX REV CODE- 250/637: Performed by: FAMILY MEDICINE

## 2022-04-22 PROCEDURE — 74011250636 HC RX REV CODE- 250/636: Performed by: INTERNAL MEDICINE

## 2022-04-22 PROCEDURE — 74011250637 HC RX REV CODE- 250/637: Performed by: STUDENT IN AN ORGANIZED HEALTH CARE EDUCATION/TRAINING PROGRAM

## 2022-04-22 PROCEDURE — 85025 COMPLETE CBC W/AUTO DIFF WBC: CPT

## 2022-04-22 PROCEDURE — 74011000250 HC RX REV CODE- 250: Performed by: INTERNAL MEDICINE

## 2022-04-22 PROCEDURE — 80053 COMPREHEN METABOLIC PANEL: CPT

## 2022-04-22 PROCEDURE — 74011000250 HC RX REV CODE- 250: Performed by: FAMILY MEDICINE

## 2022-04-22 PROCEDURE — 74011250637 HC RX REV CODE- 250/637: Performed by: HOSPITALIST

## 2022-04-22 PROCEDURE — 36415 COLL VENOUS BLD VENIPUNCTURE: CPT

## 2022-04-22 PROCEDURE — 74011250636 HC RX REV CODE- 250/636: Performed by: FAMILY MEDICINE

## 2022-04-22 PROCEDURE — 74011250636 HC RX REV CODE- 250/636: Performed by: HOSPITALIST

## 2022-04-22 PROCEDURE — 87040 BLOOD CULTURE FOR BACTERIA: CPT

## 2022-04-22 PROCEDURE — 74011000250 HC RX REV CODE- 250: Performed by: STUDENT IN AN ORGANIZED HEALTH CARE EDUCATION/TRAINING PROGRAM

## 2022-04-22 RX ORDER — OXYCODONE HYDROCHLORIDE 5 MG/1
5 TABLET ORAL
Qty: 12 TABLET | Refills: 0 | Status: SHIPPED | OUTPATIENT
Start: 2022-04-22 | End: 2022-04-25

## 2022-04-22 RX ORDER — BUSPIRONE HYDROCHLORIDE 10 MG/1
10 TABLET ORAL
Qty: 12 TABLET | Refills: 0 | Status: SHIPPED | OUTPATIENT
Start: 2022-04-22

## 2022-04-22 RX ORDER — OXYCODONE HYDROCHLORIDE 5 MG/1
5 TABLET ORAL
Status: DISCONTINUED | OUTPATIENT
Start: 2022-04-22 | End: 2022-04-22 | Stop reason: HOSPADM

## 2022-04-22 RX ORDER — LORAZEPAM 1 MG/1
1 TABLET ORAL
Status: DISCONTINUED | OUTPATIENT
Start: 2022-04-22 | End: 2022-04-22 | Stop reason: HOSPADM

## 2022-04-22 RX ORDER — HYDROMORPHONE HYDROCHLORIDE 1 MG/ML
0.5 INJECTION, SOLUTION INTRAMUSCULAR; INTRAVENOUS; SUBCUTANEOUS ONCE
Status: COMPLETED | OUTPATIENT
Start: 2022-04-22 | End: 2022-04-22

## 2022-04-22 RX ADMIN — VANCOMYCIN HYDROCHLORIDE 1250 MG: 10 INJECTION, POWDER, LYOPHILIZED, FOR SOLUTION INTRAVENOUS at 00:23

## 2022-04-22 RX ADMIN — SODIUM CHLORIDE, PRESERVATIVE FREE 10 ML: 5 INJECTION INTRAVENOUS at 13:00

## 2022-04-22 RX ADMIN — LORAZEPAM 1 MG: 2 INJECTION INTRAMUSCULAR; INTRAVENOUS at 00:23

## 2022-04-22 RX ADMIN — BUMETANIDE 2 MG: 0.25 INJECTION, SOLUTION INTRAMUSCULAR; INTRAVENOUS at 08:58

## 2022-04-22 RX ADMIN — CALCIUM 1500 MG: 500 TABLET ORAL at 06:36

## 2022-04-22 RX ADMIN — SODIUM CHLORIDE, PRESERVATIVE FREE 10 ML: 5 INJECTION INTRAVENOUS at 06:33

## 2022-04-22 RX ADMIN — SEVELAMER CARBONATE 800 MG: 800 TABLET, FILM COATED ORAL at 12:15

## 2022-04-22 RX ADMIN — LEVOTHYROXINE SODIUM 100 MCG: 0.1 TABLET ORAL at 06:33

## 2022-04-22 RX ADMIN — HYDROMORPHONE HYDROCHLORIDE 0.5 MG: 1 INJECTION, SOLUTION INTRAMUSCULAR; INTRAVENOUS; SUBCUTANEOUS at 09:10

## 2022-04-22 RX ADMIN — LORAZEPAM 1 MG: 1 TABLET ORAL at 12:15

## 2022-04-22 RX ADMIN — DIVALPROEX SODIUM 500 MG: 500 TABLET, DELAYED RELEASE ORAL at 09:00

## 2022-04-22 RX ADMIN — CALCITRIOL CAPSULES 0.25 MCG 1.5 MCG: 0.25 CAPSULE ORAL at 09:00

## 2022-04-22 RX ADMIN — AMLODIPINE BESYLATE 10 MG: 5 TABLET ORAL at 12:15

## 2022-04-22 RX ADMIN — CALCIUM 1500 MG: 500 TABLET ORAL at 12:19

## 2022-04-22 RX ADMIN — ONDANSETRON 4 MG: 2 INJECTION INTRAMUSCULAR; INTRAVENOUS at 09:02

## 2022-04-22 RX ADMIN — METOPROLOL TARTRATE 2.5 MG: 5 INJECTION INTRAVENOUS at 05:14

## 2022-04-22 RX ADMIN — ESCITALOPRAM OXALATE 10 MG: 10 TABLET ORAL at 09:00

## 2022-04-22 RX ADMIN — OXYCODONE 10 MG: 5 TABLET ORAL at 05:05

## 2022-04-22 RX ADMIN — LABETALOL HYDROCHLORIDE 200 MG: 100 TABLET, FILM COATED ORAL at 09:00

## 2022-04-22 RX ADMIN — FERROUS SULFATE TAB 325 MG (65 MG ELEMENTAL FE) 325 MG: 325 (65 FE) TAB at 09:00

## 2022-04-22 RX ADMIN — SEVELAMER CARBONATE 800 MG: 800 TABLET, FILM COATED ORAL at 09:00

## 2022-04-22 RX ADMIN — FAMOTIDINE 20 MG: 20 TABLET, FILM COATED ORAL at 05:05

## 2022-04-22 RX ADMIN — LOSARTAN POTASSIUM 50 MG: 50 TABLET, FILM COATED ORAL at 09:00

## 2022-04-22 RX ADMIN — METOPROLOL TARTRATE 2.5 MG: 5 INJECTION INTRAVENOUS at 12:19

## 2022-04-22 NOTE — PROGRESS NOTES
Progress Note    Patient: Sunny Ball MRN: [id number]  SSN: [id number]    YOB: 1995  Age: 32 y.o. Sex: female      Admit Date: 2022    * No surgery date entered *    Procedure:  Procedure(s):      Subjective:     Patient has no new complaints. Abd pain essen resolved. +BM. Good appetite, tolerating PO. Wants to go home -- pt remains belligerent and argumentative, at times inappropriate. Objective:     Visit Vitals  BP (!) 150/90   Pulse 78   Temp 98.2 °F (36.8 °C)   Resp 17   Ht 5' 6\" (1.676 m)   Wt 84.4 kg (186 lb)   SpO2 100%   BMI 30.02 kg/m²       Temp (24hrs), Av.3 °F (36.8 °C), Min:97.8 °F (36.6 °C), Max:98.7 °F (37.1 °C)      Physical Exam:    General:  Alert, cooperative, no distress. Lungs:   Clear to auscultation bilaterally. Heart:  Regular rate and rhythm. Abdomen:   Soft, min SP tender, improved. Bowel sounds normal. No peritoneal signs. Extremities: Extremities normal, atraumatic, no cyanosis or edema. Data Review: images and reports reviewed    Lab Review: All lab results for the last 24 hours reviewed.  H/H improved to 7.9/24.7    Assessment:     Hospital Problems  Date Reviewed: 2022          Codes Class Noted POA    Anemia ICD-10-CM: D64.9  ICD-9-CM: 285.9  2022 Yes        Peritoneal hematoma ICD-10-CM: A86.87QP  ICD-9-CM: 868.00  2022 Yes        Peritoneal dialysis catheter dysfunction ([facility] ICD-10-CM: G60.104X  ICD-9-CM: 996.56  2022 Yes        * (Principal) Acute bleeding ICD-10-CM: R58  ICD-9-CM: 459.0  2022 Yes        Uncontrolled hypertension ICD-10-CM: I10  ICD-9-CM: 401.9  2022 Unknown        Lupus nephritis ([facility] ICD-10-CM: M32.14  ICD-9-CM: 710.0, 583.81  2022 Unknown        Hemoperitoneum ICD-10-CM: K66.1  ICD-9-CM: 568.81  2022 Yes        Abnormal pelvic ultrasound ICD-10-CM: R93.89  ICD-9-CM: 793.5  2022 Yes              Plan/Recommendations/Medical Decision Making:     Continue present treatment . Hemoperitoneum/anemia, hemorrhagic ovarian cyst.  No urgent need for gen surg intervention -- pelvic hematoma will continue to resolve slowly/spontaneously. PO as tolerated. Pt clear from gen surgery to be d/c'd to home whenever OK w IM/Nephro. Start flushes per Nephro after d/c.

## 2022-04-22 NOTE — PROGRESS NOTES
Problem: General Infection Care Plan (Adult and Pediatric)  Goal: Improvement in signs and symptoms of infection  Outcome: Progressing Towards Goal  Goal: *Optimize nutritional status  Outcome: Progressing Towards Goal     Problem: Patient Education: Go to Patient Education Activity  Goal: Patient/Family Education  Outcome: Progressing Towards Goal     Problem: General Medical Care Plan  Goal: *Vital signs within specified parameters  Outcome: Progressing Towards Goal  Goal: *Labs within defined limits  Outcome: Progressing Towards Goal  Goal: *Absence of infection signs and symptoms  Outcome: Progressing Towards Goal  Goal: *Optimal pain control at patient's stated goal  Outcome: Progressing Towards Goal  Goal: *Skin integrity maintained  Outcome: Progressing Towards Goal  Goal: *Fluid volume balance  Outcome: Progressing Towards Goal  Goal: *Optimize nutritional status  Outcome: Progressing Towards Goal  Goal: *Anxiety reduced or absent  Outcome: Progressing Towards Goal  Goal: *Progressive mobility and function (eg: ADL's)  Outcome: Progressing Towards Goal     Problem: Patient Education: Go to Patient Education Activity  Goal: Patient/Family Education  Outcome: Progressing Towards Goal     Problem: Falls - Risk of  Goal: *Absence of Falls  Description: Document Klaus Fall Risk and appropriate interventions in the flowsheet.   Outcome: Progressing Towards Goal  Note: Fall Risk Interventions:            Medication Interventions: Teach patient to arise slowly,Bed/chair exit alarm,Patient to call before getting OOB                   Problem: Patient Education: Go to Patient Education Activity  Goal: Patient/Family Education  Outcome: Progressing Towards Goal     Problem: Chronic Renal Failure  Goal: *Fluid and electrolytes stabilized  Outcome: Progressing Towards Goal     Problem: Patient Education: Go to Patient Education Activity  Goal: Patient/Family Education  Outcome: Progressing Towards Goal

## 2022-04-22 NOTE — PROGRESS NOTES
Problem: General Medical Care Plan  Goal: *Anxiety reduced or absent  Outcome: Progressing Towards Goal     Problem: Falls - Risk of  Goal: *Absence of Falls  Description: Document Klaus Fall Risk and appropriate interventions in the flowsheet.   Outcome: Progressing Towards Goal  Note: Fall Risk Interventions:            Medication Interventions: Patient to call before getting OOB

## 2022-04-22 NOTE — PROGRESS NOTES
Progress Note    Patient: Sharon Fernandez MRN: [id number]  SSN: [id number]    YOB: 1995  Age: 32 y.o. Sex: female      Admit Date: 2022    * No surgery date entered *    Procedure:  Procedure(s):      Subjective:     Patient has no new complaints. Abd pain sig improved. +BM. Good appetite. Wants to go home. Objective:     Visit Vitals  BP (!) 141/66 (BP 1 Location: Right lower arm, BP Patient Position: Sitting)   Pulse 80   Temp 98 °F (36.7 °C)   Resp 16   Ht 5' 6\" (1.676 m)   Wt 79.4 kg (175 lb 0.7 oz)   SpO2 100%   BMI 28.25 kg/m²       Temp (24hrs), Av.6 °F (37 °C), Min:97.8 °F (36.6 °C), Max:99.1 °F (37.3 °C)      Physical Exam:    General:  Alert, cooperative, no distress. Lungs:   Clear to auscultation bilaterally. Heart:  Regular rate and rhythm. Abdomen:   Soft, min SP tender, improved. Bowel sounds normal. No peritoneal signs. Extremities: Extremities normal, atraumatic, no cyanosis or edema. Data Review: images and reports reviewed    Lab Review: All lab results for the last 24 hours reviewed.  H/H  after 4th PRBC    Assessment:     Hospital Problems  Date Reviewed: 2022          Codes Class Noted POA    Anemia ICD-10-CM: D64.9  ICD-9-CM: 285.9  2022 Yes        Peritoneal hematoma ICD-10-CM: E15.49TB  ICD-9-CM: 868.00  2022 Yes        Peritoneal dialysis catheter dysfunction ([facility] [address] ICD-10-CM: V85.255E  ICD-9-CM: 996.56  2022 Yes        * (Principal) Acute bleeding ICD-10-CM: R58  ICD-9-CM: 459.0  2022 Yes        Uncontrolled hypertension ICD-10-CM: I10  ICD-9-CM: 401.9  2022 Unknown        Lupus nephritis ([email or web address] ICD-10-CM: M32.14  ICD-9-CM: 710.0, 583.81  2022 Unknown        Hemoperitoneum ICD-10-CM: K66.1  ICD-9-CM: 568.81  2022 Yes        Abnormal pelvic ultrasound ICD-10-CM: R93.89  ICD-9-CM: 793.5  2022 Yes              Plan/Recommendations/Medical Decision Making: Continue present treatment . Hemoperitoneum/anemia, hemorrhagic ovarian cyst.  No urgent need for gen surg intervention. PO as tolerated. Repeat H/H in am -- if stable, clear for d/c if OK with GYN.   PD catheter to gravity/drain while inpatient, start flushes per Nephro after d/c

## 2022-04-22 NOTE — PROGRESS NOTES
Physician Progress Note      Von Velazquez  CSN #:                  [id number]  :                       1995  ADMIT DATE:       2022 11:35 AM  DISCH DATE:  RESPONDING  PROVIDER #:        Dianna Florentino MD          QUERY TEXT:    Pt admitted with  and has anemia documented. If possible, please document in progress notes and discharge summary further specificity regarding the acuity and type of anemia:    The medical record reflects the following:    Risk Factors: Peritoneal hematoma, ESRD, L PD cath for dysfunction    Clinical Indicators:  > Per ED provider- Anemia, intraperitoneal hemorrhage, intraperitoneal hematoma  > Per Nephrology-  Post hemorrhagic anemia on top of anemia of CKD   acute on chronic anemia retroperitoneal bleeding  - acute on chronic anemia PD cath ? intraabdominal bleeding  Ct revised also left ovary bleeding?  > Per H&P- Anemia of CKD  > Per ID consult - S/P diagnositc lap, lysis of adhesion and removal of L PD cath for dysfunction, and new PD placement R 04/15/2022  Anemia 2/2 above  > HGB 6.8-7.3    Treatment: receiving Nephrology consult, OBGYN consult, ID consult, General surgery consult, PRBC stat, H/h stat every 6 hrs    Thank you,  Domo Riggins, RN, BSN, CRCR  [email or web address]  Options provided:  -- Anemia due to acute blood loss  -- Anemia due to chronic blood loss  -- Anemia due to acute on chronic blood loss  -- Anemia due to CKD  -- Anemia due to acute on chronic blood loss and CKD  -- Other - I will add my own diagnosis  -- Disagree - Not applicable / Not valid  -- Disagree - Clinically unable to determine / Unknown  -- Refer to Clinical Documentation Reviewer    PROVIDER RESPONSE TEXT:    This patient has acute on chronic blood loss anemia.     Query created by: Chris Sánchez on 2022 12:37 PM      Electronically signed by:  Dianna Florentino MD 2022 7:32 AM

## 2022-04-22 NOTE — PROGRESS NOTES
2310  Verbal shift change report given to Anila Dickey RN (oncoming nurse) by Maulik Borges RN (offgoing nurse). Report included the following information SBAR, Kardex, I/O, Recent Results. [address] called for patient's positive blood cultures; Gram (+) cocci in clusters; 1 out of 2 bottles (anaerobic bottle). [address] [facility] [address]. Jason Mattson to inform of positive blood cultures. Also informed her that patient is asking for Ativan. Orders received. 0130  Noted minimal pus draining from previous PD cath site. Cleansed with NSS, dressed with Mepilex. 0500  /108. Patient refused Hydralazine, says is gives her headache. Metoprolol IV given instead. Also complained of heartburn, Pepcid given earlier than scheduled timing with pharmacy's approval.     0710  Verbal shift change report given to Cally Baker RN  (oncoming nurse) by Anila Dickey RN  (offgoing nurse). Report included the following information SBAR, Kardex, Intake/Output, MAR and Recent Results.

## 2022-04-22 NOTE — PROGRESS NOTES
Patient appeared upset when this nurse entered her room this morning, arms crossed and not answering this nurses questions at first, after a few minutes patient expressed frustration about wanting to see a psychiatrist and being in pain, made aware, consult placed. Patient also given IV dilauded for pain. Will continue to monitor.

## 2022-04-22 NOTE — PROGRESS NOTES
DC plan: home with family assistance, resumption of PD with supplies (Arabella Carmona)    Care manager met with patient, family at bedside, awaiting discharge instructions. Care manager verified patient will be resuming home PD and that she has been contacted by [facility]ie Montanez for her supplies. Care Management Interventions  PCP Verified by CM:  Yes  Last Visit to PCP: 04/06/22  Transition of Care Consult (CM Consult): Discharge Planning  Support Systems: Parent(s)  Confirm Follow Up Transport: Family  Discharge Location  Patient Expects to be Discharged to[de-identified]  (anticipate home with physician follow up)

## 2022-04-22 NOTE — PROGRESS NOTES
Pharmacy Dosing Services: Vancomycin    Consult for Vancomycin Dosing by Pharmacy by Dr. Remy Oquendo provided for this 32y.o. year old female , for indication of Bloodstream infection. Day of Therapy 01    Ht Readings from Last 1 Encounters:   04/21/22 167.6 cm (66\")        Wt Readings from Last 1 Encounters:   04/21/22 79.4 kg (175 lb 0.7 oz)        Other Current Antibiotics Zosyn 3.375g q12h   Significant Cultures pending   Serum Creatinine Lab Results   Component Value Date/Time    Creatinine 8.89 (H) 04/22/2022 01:51 AM      Creatinine Clearance Estimated Creatinine Clearance: 10.2 mL/min (A) (based on SCr of 8.89 mg/dL (H)). BUN Lab Results   Component Value Date/Time    BUN 30 (H) 04/22/2022 01:51 AM      WBC Lab Results   Component Value Date/Time    WBC 6.7 04/22/2022 01:51 AM      H/H Lab Results   Component Value Date/Time    HGB 7.9 (L) 04/22/2022 01:51 AM      Platelets Lab Results   Component Value Date/Time    PLATELET 079 18/32/1229 01:51 AM      Temp 98.7 °F (37.1 °C)     Start Vancomycin therapy, with loading dose of 1250 mg IV once on [email or web address]. Dose calculated to approximate a therapeutic trough of 15-20 mcg/mL. HD PT    Pharmacy to follow daily and will make changes to dose and/or frequency based on clinical status. PharmD.  Krista  [phone]

## 2022-04-22 NOTE — PROGRESS NOTES
Discharge orders received, IVs removed, patient dressed and taken downstairs via wheelchair by transport.

## 2022-04-22 NOTE — PROGRESS NOTES
[facility]  (A Division of [address] [place] [address])    Follow-up Note      Date of Admission: 4/19/2022       Date of Note:  4/22/2022    Summary:  Ms Jaycob Thibodeaux is a 32 y.o. BLACK/ with PMH of ESRD on PD, recent admission and evaluation for dysfunctional PD cath in March 2022, and underwent diagnositic lap and new PD placement on 4/15/22. She was admitted due to blood form PD during dialysis and abdominal pain on 4/19/22. She had no high fever or chills or leucocytosis.     Found to have HB of 4.9 on admission. She received 3 PRBC. CT A/P was abnormal for adnexal/cystic lesion on left adenxa and US of pelvis showed complex and hemorragic L adenxal lesion. She is currently on Zosyn.     At time of exam, reports she is weak and hungry. No severe abd pain. Diet resumption pending surgical teams plan per nursing. Denies N/V. No chills.       CC:  \"No stomach pains\"    HPI:  Chart reviewed last night/this morning; pt seen at bedside along with her mother and friend. No stomach pains (MUCH better). No f/s/c. Hungry. Current Antimicrobials:    Prior Antimicrobials:  1. Vanco IV (4/22-) #1 dose earlier this morning 1. Pip/tzb IV (4/19-21)  2. Cefazolin IV (4/15)       Assessment: Rec / Plan:   Pseudobacteremia - CoNS  It took >2d for this to grow out of one (of two) sets drawn on admission. MICRO called and reviewed plate - it appears to be non-hemolytic= CoNS. Contaminant. ->    Send her home today. Very likely contaminant. No need to hold her over weekend to await the repeat BCx drawn earlier this morning.    L ovarian hemorrhagic cyst    ESRD/PD    SLE    HTN        Microbiology:  4/22 - BCx x2 sent     4/20 - Peritoneal fluid - NOS (-)     4/19 - BCx 1/2 (+) CoNS      Lines / Catheters: Peripheral, L arm AVG        Patient Active Problem List   Diagnosis Code    Encephalopathy G93.40    Lupus ([address] M32.9    ESRD on dialysis ([address] N18.6, Z99.2    GERD (gastroesophageal reflux disease) K21.9    Hypertension I10    Thyroid disease E07.9    Psychiatric disorder F99    Anemia D64.9    Peritoneal hematoma S36.81XA    Peritoneal dialysis catheter dysfunction (HCC) T85.611A    Acute bleeding R58    Uncontrolled hypertension I10    Lupus nephritis (HCC) M32.14    Hemoperitoneum K66.1    Abnormal pelvic ultrasound R93.89       Current Facility-Administered Medications   Medication Dose Route Frequency    Vancomycin-Pharmacy to Dose  1 Each Other Rx Dosing/Monitoring    oxyCODONE IR (ROXICODONE) tablet 5 mg  5 mg Oral Q4H PRN    LORazepam (ATIVAN) tablet 1 mg  1 mg Oral Q4H PRN    0.9% sodium chloride infusion 250 mL  250 mL IntraVENous PRN    0.9% sodium chloride infusion 250 mL  250 mL IntraVENous PRN    losartan (COZAAR) tablet 50 mg  50 mg Oral DAILY    metoprolol (LOPRESSOR) injection 2.5 mg  2.5 mg IntraVENous Q6H PRN    amLODIPine (NORVASC) tablet 10 mg  10 mg Oral PCL    calcium carbonate (OS-VIRGILIO) tablet 1,500 mg [elemental]  1,500 mg Oral AC&HS    escitalopram oxalate (LEXAPRO) tablet 10 mg  10 mg Oral DAILY    ferrous sulfate tablet 325 mg  1 Tablet Oral BID WITH MEALS    sevelamer carbonate (RENVELA) tab 800 mg  800 mg Oral TID WITH MEALS    labetaloL (NORMODYNE) tablet 200 mg  200 mg Oral TID    levothyroxine (SYNTHROID) tablet 100 mcg  100 mcg Oral ACB    hydrOXYchloroQUINE (PLAQUENIL) tablet 200 mg  200 mg Oral QHS    divalproex DR (DEPAKOTE) tablet 500 mg  500 mg Oral BID    albuterol (PROVENTIL HFA, VENTOLIN HFA, PROAIR HFA) inhaler 2 Puff  2 Puff Inhalation Q4H PRN    calcitRIOL (ROCALTROL) capsule 1.5 mcg  1.5 mcg Oral DAILY    hydrALAZINE (APRESOLINE) 20 mg/mL injection 20 mg  20 mg IntraVENous Q6H PRN    diphenhydrAMINE (BENADRYL) injection 12.5 mg  12.5 mg IntraVENous Q6H PRN    bumetanide (BUMEX) injection 2 mg  2 mg IntraVENous Q12H    sodium chloride (NS) flush 5-40 mL  5-40 mL IntraVENous Q8H    sodium chloride (NS) flush 5-40 mL  5-40 mL IntraVENous PRN    acetaminophen (TYLENOL) tablet 650 mg  650 mg Oral Q6H PRN    Or    acetaminophen (TYLENOL) suppository 650 mg  650 mg Rectal Q6H PRN    polyethylene glycol (MIRALAX) packet 17 g  17 g Oral DAILY PRN    ondansetron (ZOFRAN ODT) tablet 4 mg  4 mg Oral Q8H PRN    Or    ondansetron (ZOFRAN) injection 4 mg  4 mg IntraVENous Q6H PRN    famotidine (PEPCID) tablet 20 mg  20 mg Oral DAILY         Review of Systems - General ROS: negative for - chills, fever or night sweats  Respiratory ROS: no cough, shortness of breath, or wheezing  Cardiovascular ROS: no chest pain or dyspnea on exertion  Gastrointestinal ROS: no abdominal pain, change in bowel habits, or black or bloody stools       Objective:    Visit Vitals  BP (!) 172/101 (BP 1 Location: Right lower arm, BP Patient Position: At rest)   Pulse 93   Temp 99.4 °F (37.4 °C)   Resp 18   Ht 5' 6\" (1.676 m)   Wt 84.4 kg (186 lb)   LMP 2022 (Approximate)   SpO2 100%   BMI 30.02 kg/m²       Temp (24hrs), Av.4 °F (36.9 °C), Min:97.8 °F (36.6 °C), Max:99.4 °F (37.4 °C)      GEN: WDWN AAF in NAD (steroid facies)  HEENT: anicteric  CHEST: CTA  CVS:RRR  ABD: NT NABS  EXT: (+) thrill L arm         Lab results:    Chemistry  Recent Labs     22  0151 22  0420 22  0259   GLU 89 92 80    142 138   K 4.3 4.6 4.1    108 105   CO2 30 28 26   BUN 30* 56* 48*   CREA 8.89* 14.20* 12.30*   CA 8.3* 7.8* 8.1*   AGAP 5 6 7   BUCR 3* 4* 4*   AP 54 53 62   TP 5.8* 5.2* 5.4*   ALB 2.1* 1.9* 2.0*   GLOB 3.7 3.3 3.4   AGRAT 0.6* 0.6* 0.6*       CBC w/ Diff  Recent Labs     22  0151 22  1155 22  0420 22  0939 22  0259   WBC 6.7  --  5.8  --  7.1   RBC 2.83*  --  2.44*  --  2.31*   HGB 7.9* 7.4* 6.8*   < > 6.4*   HCT 24.7* 22.3* 20.9*   < > 19.3*     --  135  --  126*   GRANS 70  --  72  --  84*   LYMPH 13*  --  11*  --  5*   EOS 5  --  3  --  2    < > = values in this interval not displayed. Microbiology  All Micro Results     Procedure Component Value Units Date/Time    CULTURE, BLOOD [[id number]] Collected: 04/22/22 0815    Order Status: Completed Specimen: Blood Updated: 04/22/22 0945    CULTURE, BLOOD [[id number]] Collected: 04/22/22 0810    Order Status: Completed Specimen: Blood Updated: 04/22/22 0945    CULTURE, BLOOD [[id number]] Collected: 04/19/22 1225    Order Status: Completed Specimen: Blood Updated: 04/22/22 0720     Special Requests: NO SPECIAL REQUESTS        Culture result: NO GROWTH 3 DAYS       CULTURE, BLOOD [[id number]]     Order Status: Canceled Specimen: Blood     CULTURE, BLOOD [[id number]]     Order Status: Canceled Specimen: Blood     CULTURE, BLOOD [[id number]]  (Abnormal) Collected: 04/19/22 1454    Order Status: Completed Specimen: Blood Updated: 04/21/22 2309     Special Requests: NO SPECIAL REQUESTS        GRAM STAIN ANAEROBIC BOTTLE               SMEAR CALLED TO AND CORRECTLY REPEATED BY: Joleen Foreman RN ICU 4/21/22 AT 5875 TO [facility]           Culture result:       GRAM POSITIVE COCCI IN CLUSTERS GROWING IN 1 OF 2 BOTTLES DRAWN SITE= No Site Indicated                  REMAINING BOTTLE(S) HAS/HAVE NO GROWTH SO FAR            (NOTE) CALLED POSITIVE BLOOD CULTURE OF GPC IN CLUSTERS GROWING IN 1 OUT OF 2 BOTTLES TO Sarah Vidal RN AT 2300 ON 4/21/22.  AT    CULTURE, BODY FLUID Pardeep Ascencio [[id number]] Collected: 04/20/22 1017    Order Status: Completed Specimen: Peritoneal Fluid Updated: 04/21/22 1122     Special Requests: NO SPECIAL REQUESTS        GRAM STAIN 2+ WBCS SEEN         NO ORGANISMS SEEN        Culture result:       Culture performed on Unspun Fluid NO GROWTH THUS FAR                 Coleen Terrell MD  Cell [phone]  [facility]   4/22/2022   11:44 AM

## 2022-04-22 NOTE — PROGRESS NOTES
Progress Note  (OB GYN)    Patient: Sisi Bergeron MRN: [id number]  SSN: [id number]    YOB: 1995  Age: 32 y.o. Sex: female    ROOM: [facility]/  Subjective:     Hosp Day: 4    The patient feels better. The patient denies pelvic pain. The patient is ambulating well. The patient  tolerating a normal diet. Flatus has been passed. BM done has.      Objective:        Vitals table based on RN flowsheet:  Patient Vitals for the past 4 hrs:   Temp   04/22/22 0645 98.7 °F (37.1 °C)   04/22/22 0328 98.1 °F (36.7 °C)    Patient Vitals for the past 4 hrs:   Pulse   04/22/22 0645 78   04/22/22 0328 89    Patient Vitals for the past 4 hrs:   Resp   04/22/22 0645 16   04/22/22 0328 16    Patient Vitals for the past 4 hrs:   BP   04/22/22 0648 (!) 146/95   04/22/22 0645 (!) 168/89   04/22/22 0528 (!) 140/84   04/22/22 0328 (!) 169/108            Patient Vitals for the past 48 hrs:   BP Temp Temp src Pulse Resp SpO2 Height Weight   04/22/22 0648 (!) 146/95          04/22/22 0645 (!) 168/89 98.7 °F (37.1 °C)  78 16 100 %     04/22/22 0528 (!) 140/84          04/22/22 0328 (!) 169/108 98.1 °F (36.7 °C)  89 16 100 %     04/21/22 2306 (!) 165/95 98.7 °F (37.1 °C)  81 16 100 %     04/21/22 1904 (!) 141/66 98 °F (36.7 °C)  80 16 100 %     04/21/22 1400 (!) 148/84   82 16 100 %     04/21/22 1345 (!) 161/97 97.8 °F (36.6 °C)  87 22 100 %     04/21/22 1330 (!) 168/130   84 10 100 %     04/21/22 1315 (!) 145/107   79 15 100 %     04/21/22 1300 (!) 159/108 98.7 °F (37.1 °C)  84 13 100 %     04/21/22 1245 (!) 146/87   85 15 98 %     04/21/22 1230 (!) 159/95   82 15 99 %     04/21/22 1215 (!) 160/99   82 14 100 %     04/21/22 1200 (!) 159/144   82 20 100 %     04/21/22 1145 (!) 147/136   88 19 100 %     04/21/22 1130 (!) 165/103   75 13 100 %     04/21/22 1115 (!) 188/98    16 100 %     04/21/22 1100 (!) 197/150   80 14 100 %     04/21/22 1042  98.7 °F (37.1 °C) Oral 77 14 100 %     04/21/22 1030 (!) 152/102 98.7 °F (37.1 °C)  77 14 100 %     04/21/22 1000    79 18 100 %     04/21/22 0930    78 17 100 %     04/21/22 0900    81 14 100 %     04/21/22 0858  98.6 °F (37 °C)         04/21/22 0830 (!) 153/109   79 9 100 %     04/21/22 0800 (!) 179/109   74 11      04/21/22 0730 (!) 158/103 99.1 °F (37.3 °C)  75 16 100 %     04/21/22 0728  99.1 °F (37.3 °C)         04/21/22 0711  98.7 °F (37.1 °C)         04/21/22 0700 (!) 170/102   78 16 100 %     04/21/22 0632 (!) 164/94   77 17 100 %     04/21/22 0630    76 18 100 %     04/21/22 0600 137/88   80 30 100 %     04/21/22 0530 (!) 161/103   77 15 100 %     04/21/22 0500 (!) 163/108   93 22      04/21/22 0430 (!) 160/99   82 16 100 %     04/21/22 0400 (!) 151/94 99 °F (37.2 °C)  78 21 100 %     04/21/22 0330 (!) 152/104   79 19 100 %     04/21/22 0327       5' 6\" (1.676 m) 79.4 kg (175 lb 0.7 oz)   04/21/22 0300 (!) 193/131   86 18 100 %     04/21/22 0230 134/67   84 (!) 33 97 %     04/21/22 0200 (!) 142/76   84 17 99 %     04/21/22 0145 138/78   80 21 100 %     04/21/22 0130 (!) 154/99   84 22      04/21/22 0115 (!) 143/77   80 15 100 %     04/21/22 0100 (!) 148/91   80 15 100 %     04/21/22 0045 139/75   79 16 100 %     04/21/22 0030 (!) 141/69   79 16 100 %     04/21/22 0015 (!) 144/83   79 16 100 %     04/21/22 0000 (!) 144/86 98.6 °F (37 °C)  83 15 100 %     04/20/22 2345 (!) 143/76   85 30 99 %     04/20/22 2330 (!) 144/82   83 23 98 %     04/20/22 2315 (!) 145/84   80 17 100 %     04/20/22 2300 (!) 141/88   87 21 100 %     04/20/22 2245 (!) 164/84   84 15 100 %     04/20/22 2230 (!) 167/96   91 15 100 %     04/20/22 2217 (!) 158/107   88 19 (!) 87 %     04/20/22 2200    94 22 100 %     04/20/22 2145 (!) 187/119   89 19 100 %     04/20/22 2130 (!) 171/101   94 15 100 %     04/20/22 2115 (!) 180/84   93 15 100 %     04/20/22 2100 (!) 154/73   92 16 99 %     04/20/22 2045 (!) 155/87   96 13 100 %     04/20/22 2030 (!) 171/85   97 13 100 %     04/20/22 2015 (!) 143/91   95 15 100 %     04/20/22 2000 127/76 98.8 °F (37.1 °C)  96 18 92 %     04/20/22 1945 (!) 145/85   96 14 98 %     04/20/22 1930 (!) 142/84   94 17 100 %     04/20/22 1915    93 17 97 %     04/20/22 1900 (!) 157/72   94 29 99 %     04/20/22 1845 (!) 154/72   94 17 100 %     04/20/22 1830 (!) 154/74   90 18 100 %     04/20/22 1815 (!) 170/86   86 13 100 %     04/20/22 1800 (!) 180/114   94 21 100 %     04/20/22 1745    95 18 100 %     04/20/22 1730 (!) 162/84 98 °F (36.7 °C)  88 14 100 %     04/20/22 1715    92 24 100 %     04/20/22 1700 (!) 156/91   89 17 100 %     04/20/22 1645    90 15 100 %     04/20/22 1630 (!) 162/90   90 14 100 %     04/20/22 1615    87 9 100 %     04/20/22 1600 (!) 177/114   91 11 100 %     04/20/22 1545    95 18 100 %     04/20/22 1531 (!) 175/97          04/20/22 1530 (!) 175/97   93 19 100 %     04/20/22 1515    88 18 100 %     04/20/22 1500 (!) 196/97   82 14 100 %     04/20/22 1445    87 17 100 %     04/20/22 1430 (!) 190/95   86 17 100 %     04/20/22 1415    82 17 100 %     04/20/22 1400 (!) 216/87   81 16 100 %     04/20/22 1345    95 22      04/20/22 1330 (!) 195/93   86 15 100 %     04/20/22 1315 (!) 194/89   86 15 100 %     04/20/22 1300 (!) 172/78   92 16 97 %     04/20/22 1245 (!) 178/81   91 16 97 %     04/20/22 1230 (!) 194/84   90 16 98 %     04/20/22 1215 (!) 190/82   90 17 100 %     04/20/22 1210 (!) 191/85   90 16 100 %     04/20/22 1207  99 °F (37.2 °C)         04/20/22 1200 (!) 198/88   84 15 100 %     04/20/22 1150    90 14 98 %     04/20/22 1145 (!) 198/89   87 15      04/20/22 1140    91 13 100 %     04/20/22 1130 (!) 170/124   94 19      04/20/22 1120    97 18 (!) 78 %     04/20/22 1115 (!) 175/97   85 18 99 %     04/20/22 1110    86 10 100 %     04/20/22 1100 (!) 151/111   92       04/20/22 1050    92 16 98 %     04/20/22 1045 (!) 163/98   92 21 100 %     04/20/22 1040    84 15 100 %     04/20/22 1030 (!) 163/91   89 15 100 %     04/20/22 1020    97 19 100 %     04/20/22 1015 (!) 169/99   96 13 100 %     04/20/22 1010    100 18 100 %     04/20/22 1000 (!) 182/99   (!) 101 28 99 %     04/20/22 0950    90 11 92 %     04/20/22 0945 (!) 157/92   85 15 100 %     04/20/22 0940    87 18 100 %     04/20/22 0930 (!) 172/95   90 19 100 %     04/20/22 0920    92 21 100 %     04/20/22 0915 (!) 159/93   87 16 100 %     04/20/22 0910    91 21 100 %     04/20/22 0900 (!) 160/99   88 16 100 %     04/20/22 0850    81 16 100 %     04/20/22 0845 (!) 177/111   82 17 100 %     04/20/22 0840 (!) 182/109   84 14 100 %     04/20/22 0830 (!) 188/110   89 13 100 %     04/20/22 0820    86 18 100 %     04/20/22 0815 (!) 179/104   84 12 100 %     04/20/22 0810    86 18 100 %     04/20/22 0800 (!) 159/89 98 °F (36.7 °C)  85 17 100 %     04/20/22 0750    84 17 100 %     04/20/22 0745 (!) 165/94   84 27 100 %     04/20/22 0740    88 19 100 %     04/20/22 0730 (!) 156/89   85 28 100 %     04/20/22 0720    88 18 100 %     04/20/22 0715 (!) 142/80   87 18 100 %     04/20/22 0710    84 14 100 %         Objective:      Patient Vitals for the past 24 hrs:   BP Temp Temp src Pulse Resp SpO2   04/22/22 0648 (!) 146/95        04/22/22 0645 (!) 168/89 98.7 °F (37.1 °C)  78 16 100 %   04/22/22 0528 (!) 140/84        04/22/22 0328 (!) 169/108 98.1 °F (36.7 °C)  89 16 100 %   04/21/22 2306 (!) 165/95 98.7 °F (37.1 °C)  81 16 100 % 04/21/22 1904 (!) 141/66 98 °F (36.7 °C)  80 16 100 %   04/21/22 1400 (!) 148/84   82 16 100 %   04/21/22 1345 (!) 161/97 97.8 °F (36.6 °C)  87 22 100 %   04/21/22 1330 (!) 168/130   84 10 100 %   04/21/22 1315 (!) 145/107   79 15 100 %   04/21/22 1300 (!) 159/108 98.7 °F (37.1 °C)  84 13 100 %   04/21/22 1245 (!) 146/87   85 15 98 %   04/21/22 1230 (!) 159/95   82 15 99 %   04/21/22 1215 (!) 160/99   82 14 100 %   04/21/22 1200 (!) 159/144   82 20 100 %   04/21/22 1145 (!) 147/136   88 19 100 %   04/21/22 1130 (!) 165/103   75 13 100 %   04/21/22 1115 (!) 188/98    16 100 %   04/21/22 1100 (!) 197/150   80 14 100 %   04/21/22 1042  98.7 °F (37.1 °C) Oral 77 14 100 %   04/21/22 1030 (!) 152/102 98.7 °F (37.1 °C)  77 14 100 %   04/21/22 1000    79 18 100 %   04/21/22 0930    78 17 100 %   04/21/22 0900    81 14 100 %   04/21/22 0858  98.6 °F (37 °C)       04/21/22 0830 (!) 153/109   79 9 100 %   04/21/22 0800 (!) 179/109   74 11    04/21/22 0730 (!) 158/103 99.1 °F (37.3 °C)  75 16 100 %   04/21/22 0728  99.1 °F (37.3 °C)       04/21/22 0711  98.7 °F (37.1 °C)         LABS: Recent Results (from the past 48 hour(s))   HGB & HCT    Collection Time: 04/20/22  9:39 AM   Result Value Ref Range    HGB 7.3 (L) 12.0 - 16.0 g/dL    HCT 21.2 (L) 35.0 - 45.0 %   CULTURE, BODY FLUID W GRAM STAIN    Collection Time: 04/20/22 10:17 AM    Specimen: Peritoneal Fluid   Result Value Ref Range    Special Requests: NO SPECIAL REQUESTS      GRAM STAIN 2+ WBCS SEEN      GRAM STAIN NO ORGANISMS SEEN      Culture result:        Culture performed on Unspun Fluid NO GROWTH THUS FAR   CELL COUNT, BODY FLUID    Collection Time: 04/20/22 10:17 AM   Result Value Ref Range    BODY FLUID TYPE PERITONEAL CAVITY      FLUID COLOR RED      FLUID APPEARANCE TURBID      FLUID RBC CT. (A) NRRE /cu mm     SPECIMEN GROSSLY BLOODY. RBC'S TOO NUMEROUS TO COUNT.     FLUID NUCLEATED CELLS 2,573 (A) NRRE /cu mm   HGB & HCT    Collection Time: 04/20/22  3:45 PM   Result Value Ref Range    HGB 7.0 (L) 12.0 - 16.0 g/dL    HCT 21.2 (L) 35.0 - 45.0 %   HGB & HCT    Collection Time: 04/20/22 10:52 PM   Result Value Ref Range    HGB 7.0 (L) 12.0 - 16.0 g/dL    HCT 22.0 (L) 35.0 - 65.6 %   METABOLIC PANEL, COMPREHENSIVE    Collection Time: 04/21/22  4:20 AM   Result Value Ref Range    Sodium 142 136 - 145 mmol/L    Potassium 4.6 3.5 - 5.5 mmol/L    Chloride 108 100 - 111 mmol/L    CO2 28 21 - 32 mmol/L    Anion gap 6 3.0 - 18 mmol/L    Glucose 92 74 - 99 mg/dL    BUN 56 (H) 7.0 - 18 MG/DL    Creatinine 14.20 (H) 0.6 - 1.3 MG/DL    BUN/Creatinine ratio 4 (L) 12 - 20      GFR est AA 4 (L) >60 ml/min/1.73m2    GFR est non-AA 3 (L) >60 ml/min/1.73m2    Calcium 7.8 (L) 8.5 - 10.1 MG/DL    Bilirubin, total 0.5 0.2 - 1.0 MG/DL    ALT (SGPT) <6 (L) 13 - 56 U/L    AST (SGOT) 9 (L) 10 - 38 U/L    Alk. phosphatase 53 45 - 117 U/L    Protein, total 5.2 (L) 6.4 - 8.2 g/dL    Albumin 1.9 (L) 3.4 - 5.0 g/dL    Globulin 3.3 2.0 - 4.0 g/dL    A-G Ratio 0.6 (L) 0.8 - 1.7     CBC WITH AUTOMATED DIFF    Collection Time: 04/21/22  4:20 AM   Result Value Ref Range    WBC 5.8 4.6 - 13.2 K/uL    RBC 2.44 (L) 4.20 - 5.30 M/uL    HGB 6.8 (L) 12.0 - 16.0 g/dL    HCT 20.9 (L) 35.0 - 45.0 %    MCV 85.7 78.0 - 100.0 FL    MCH 27.9 24.0 - 34.0 PG    MCHC 32.5 31.0 - 37.0 g/dL    RDW 14.9 (H) 11.6 - 14.5 %    PLATELET 230 295 - 975 K/uL    MPV 12.8 (H) 9.2 - 11.8 FL    NRBC 0.0 0  WBC    ABSOLUTE NRBC 0.00 0.00 - 0.01 K/uL    NEUTROPHILS 72 40 - 73 %    LYMPHOCYTES 11 (L) 21 - 52 %    MONOCYTES 13 (H) 3 - 10 %    EOSINOPHILS 3 0 - 5 %    BASOPHILS 0 0 - 2 %    IMMATURE GRANULOCYTES 1 (H) 0.0 - 0.5 %    ABS. NEUTROPHILS 4.2 1.8 - 8.0 K/UL    ABS. LYMPHOCYTES 0.7 (L) 0.9 - 3.6 K/UL    ABS. MONOCYTES 0.8 0.05 - 1.2 K/UL    ABS. EOSINOPHILS 0.2 0.0 - 0.4 K/UL    ABS. BASOPHILS 0.0 0.0 - 0.1 K/UL    ABS. IMM.  GRANS. 0.1 (H) 0.00 - 0.04 K/UL    DF AUTOMATED     PROTHROMBIN TIME + INR    Collection Time: 04/21/22  4:20 AM   Result Value Ref Range    Prothrombin time 14.0 11.5 - 15.2 sec    INR 1.1 0.8 - 1.2     RBC, ALLOCATE    Collection Time: 04/21/22  6:45 AM   Result Value Ref Range    HISTORY CHECKED? Historical check performed    RBC, ALLOCATE    Collection Time: 04/21/22  7:45 AM   Result Value Ref Range    HISTORY CHECKED? Historical check performed    HGB & HCT    Collection Time: 04/21/22 11:55 AM   Result Value Ref Range    HGB 7.4 (L) 12.0 - 16.0 g/dL    HCT 22.3 (L) 35.0 - 06.0 %   METABOLIC PANEL, COMPREHENSIVE    Collection Time: 04/22/22  1:51 AM   Result Value Ref Range    Sodium 141 136 - 145 mmol/L    Potassium 4.3 3.5 - 5.5 mmol/L    Chloride 106 100 - 111 mmol/L    CO2 30 21 - 32 mmol/L    Anion gap 5 3.0 - 18 mmol/L    Glucose 89 74 - 99 mg/dL    BUN 30 (H) 7.0 - 18 MG/DL    Creatinine 8.89 (H) 0.6 - 1.3 MG/DL    BUN/Creatinine ratio 3 (L) 12 - 20      GFR est AA 7 (L) >60 ml/min/1.73m2    GFR est non-AA 5 (L) >60 ml/min/1.73m2    Calcium 8.3 (L) 8.5 - 10.1 MG/DL    Bilirubin, total 0.5 0.2 - 1.0 MG/DL    ALT (SGPT) <6 (L) 13 - 56 U/L    AST (SGOT) 10 10 - 38 U/L    Alk.  phosphatase 54 45 - 117 U/L    Protein, total 5.8 (L) 6.4 - 8.2 g/dL    Albumin 2.1 (L) 3.4 - 5.0 g/dL    Globulin 3.7 2.0 - 4.0 g/dL    A-G Ratio 0.6 (L) 0.8 - 1.7     CBC WITH AUTOMATED DIFF    Collection Time: 04/22/22  1:51 AM   Result Value Ref Range    WBC 6.7 4.6 - 13.2 K/uL    RBC 2.83 (L) 4.20 - 5.30 M/uL    HGB 7.9 (L) 12.0 - 16.0 g/dL    HCT 24.7 (L) 35.0 - 45.0 %    MCV 87.3 78.0 - 100.0 FL    MCH 27.9 24.0 - 34.0 PG    MCHC 32.0 31.0 - 37.0 g/dL    RDW 14.7 (H) 11.6 - 14.5 %    PLATELET 896 489 - 242 K/uL    MPV 12.4 (H) 9.2 - 11.8 FL    NRBC 0.0 0  WBC    ABSOLUTE NRBC 0.00 0.00 - 0.01 K/uL    NEUTROPHILS 70 40 - 73 %    LYMPHOCYTES 13 (L) 21 - 52 %    MONOCYTES 12 (H) 3 - 10 %    EOSINOPHILS 5 0 - 5 %    BASOPHILS 0 0 - 2 %    IMMATURE GRANULOCYTES 1 (H) 0.0 - 0.5 %    ABS. NEUTROPHILS 4.7 1.8 - 8.0 K/UL    ABS. LYMPHOCYTES 0.8 (L) 0.9 - 3.6 K/UL    ABS. MONOCYTES 0.8 0.05 - 1.2 K/UL    ABS. EOSINOPHILS 0.3 0.0 - 0.4 K/UL    ABS. BASOPHILS 0.0 0.0 - 0.1 K/UL    ABS. IMM. GRANS. 0.0 0.00 - 0.04 K/UL    DF AUTOMATED                Abdomen:  Soft. notdistended, bowel sounds present    not tender          DVT Evaluation:  No evidence of DVT seen on physical exam.  Negative Honey's sign. No cords or calf tenderness. No significant calf/ankle edema. Lab/Data Review: All lab results for the last 24 hours reviewed. Assessment:     Status:   Note BP and lab. No vag bleeding. Progress discussed with patient and further management in hospital. Long term prognosis discussed      Risk of deterioration: medium        Plan:      Will continue to follow    Signed By: Kurt Solomon MD     April 22, 2022

## 2022-04-22 NOTE — DISCHARGE SUMMARY
[facility] SUMMARY    Name:  Jonah Krabbe  MR#:   [id number]  :  1995  ACCOUNT #:  [de-identified]  ADMIT DATE:  2022  DISCHARGE DATE:  2022    CONSULTANTS:  1.  Dr. Burke Delaney, Infectious Disease. 2.  Dr. Jess Sánchez, General Surgery. 3.  Dr. Harry Jefferson, Nephrology. 4.  Dr. Jazmin Quezada, Critical Care Pulmonology. 5.  Dr. Jason Godfrey, GYN. 6.  Dr. Quin Gambino, Infectious Disease. DISCHARGE DIAGNOSES:  1. Abdominal hemoperitoneum with hemorrhagic shock. 2.  Severe acute blood loss anemia. 3.  End-stage renal disease, on peritoneal dialysis. 4.  Uncontrolled hypertension. 5.  Complex left adnexal lesion. 6.  Lupus nephritis. 7.  Hyponatremia. 8.  Bipolar disorder. 9.  Menorrhagia. HOSPITAL SUMMARY:  This is a 59-year-old chronically ill female, who is on peritoneal dialysis and recently had a new peritoneal catheter placed. She came in, admitted for abdominal pain and posthemorrhagic anemia, superimposed on anemia of chronic kidney disease. She had symptomatic anemia and was admitted to the intensive care unit, required blood transfusions and plasma. Multiple consultants were involved including GYN looking at a complex left adnexal lesion as well as General Surgery and Critical Care Pulmonology. The patient was anticipated to go to surgery, but it appeared that her bleeding had stopped and her hemoglobin stabilized after transfusion. She was seen by GYN who noted that this was a cyst that could cause chronic pain that was noted on the CT scan. They recommended Depo-Provera injection to help manage her menorrhagia and menses, and recommended a repeat ultrasound in a month. The patient was able to transfer out of the intensive care unit. She had consultation with Infectious Disease here. Her antibiotics were discontinued as the peritoneal fluid was no growth.   There was a slow return of a positive blood culture that was reviewed by Infectious Disease, but noted to likely be contaminant. Surgery has signed off on her at this point. Her abdomen is stable, although she is still having intermittent pain requiring Percocet-type medication. She had a lot of anxiety and agitation with her care this morning and being frustrated that she wanted to leave the hospital.  She requested that she see a psychiatrist about her bipolar disorder while here whom I did consult, but subsequently since she has been cleared by the other specialists, her desire is to go home now and not wait to meet with Psychiatry. There is no suicidal or homicidal ideation. Her family has been supportive and have been present in the room today with her. Everyone feels comfortable with her going home at this point. The patient is going to resume her peritoneal dialysis with [facility]. With that, today, this is a 51-year-old female initially very agitated, anxious and unhappy but after adjusting her diet per her request and making sure that all specialists were able to engage in making her plan today, she has been pleasant and agreeable with the current plan. Blood pressure was elevated but is coming down 165/92, pulse 93, temp 99.4, respiratory rate 18, SaO2 100%. Abdomen is soft. Mild distention and no real tenderness. No peritoneal signs. Peritoneal catheter is in place and dressed. Normal bowel sounds. Lungs are clear. Cardiac exam, regular rate and rhythm. Lower extremities are without pitting edema. Most recent labs showed an H and H of 7.9 and 24.7, platelets are normal.  White count is normal.  Electrolytes are all within normal limits. The BUN and creatinine are consistent with end-stage renal disease, and she is stable for release from the hospital today. No antibiotics per Infectious Disease. Follow up with Dr. Rock Ortega in 1-2 weeks. [facility] for peritoneal dialysis tomorrow and with her primary care doctor, Dr. Woo Ruth in 1 week.     MEDICATIONS:  Medications that she will continue on discharge are,  1. Pulmicort nebs after lunch. 2.  Bumex 2 mg daily. 3.  Zyrtec 10 mg daily as needed. 4.  Advair 1 puff twice daily. 5.  Protonix 40 mg twice daily. 6.  Sodium bicarbonate 1 tablet twice daily. 7.  Rocaltrol 1.5 mcg twice daily. 8.  Calcium carbonate 3 tablets before breakfast, lunch and dinner. 9.  Lexapro 10 mg daily. 10.  Renvela 800 mg three times daily with meals. 11.  Albuterol 2 puffs every 4 hours as needed. 12.  Norvasc 10 mg daily. 13.  Depakote 500 mg twice daily for bipolar disorder. 14.  Pepcid 20 mg at night. 15.  Ferrous sulfate 324 mg twice daily. 16.  Plaquenil 200 mg at night. 17.  Labetalol 200 mg three times daily. 18.  Synthroid 100 mcg daily. 19.  I called in oxycodone 5 mg 1 tablet every 8 hours needed for severe pain, #12 tablets, no refill. 20. BuSpar 10 mg 1 tablet three times daily as needed for anxiety, #12 tablets. I advised not taking anxiety and pain medication together. There is not a benzodiazepine prescribed to her because she has a narcotic prescription described and advised using those judiciously at home with Tylenol as needed for pain, that is mild to moderate in the interim. She understands these instructions. She is stable for release to home today. Discussed the plan with all the specialists, and she declines further evaluation by Psychiatry at this point. Forty-five minutes discharge time.         Mylo Heimlich, MD      [id number]  D:  04/22/2022 13:04  T:  04/22/2022 14:28  JOB #:  [id number]

## 2022-04-24 LAB
BACTERIA SPEC CULT: NORMAL
GRAM STN SPEC: NORMAL
GRAM STN SPEC: NORMAL
SERVICE CMNT-IMP: NORMAL

## 2022-04-25 LAB
BACTERIA SPEC CULT: NORMAL
BLD PROD TYP BPU: NORMAL
BLD PROD TYP BPU: NORMAL
BPU ID: NORMAL
BPU ID: NORMAL
SERVICE CMNT-IMP: NORMAL
STATUS OF UNIT,%ST: NORMAL
STATUS OF UNIT,%ST: NORMAL
UNIT DIVISION, %UDIV: 0
UNIT DIVISION, %UDIV: 0

## 2022-05-21 LAB
ABO + RH BLD: NORMAL
BLD PROD TYP BPU: NORMAL
BLOOD GROUP ANTIBODIES SERPL: NORMAL
BPU ID: NORMAL
CALLED TO:,BCALL1: NORMAL
CALLED TO:,BCALL2: NORMAL
CROSSMATCH RESULT,%XM: NORMAL
SPECIMEN EXP DATE BLD: NORMAL
STATUS OF UNIT,%ST: NORMAL
UNIT DIVISION, %UDIV: 0

## 2024-06-14 ENCOUNTER — APPOINTMENT (OUTPATIENT)
Facility: HOSPITAL | Age: 29
End: 2024-06-14
Payer: COMMERCIAL

## 2024-06-14 ENCOUNTER — HOSPITAL ENCOUNTER (EMERGENCY)
Facility: HOSPITAL | Age: 29
Discharge: ANOTHER HOSPITAL | End: 2024-06-15
Attending: STUDENT IN AN ORGANIZED HEALTH CARE EDUCATION/TRAINING PROGRAM
Payer: COMMERCIAL

## 2024-06-14 DIAGNOSIS — R14.0 ABDOMINAL DISTENSION: Primary | ICD-10-CM

## 2024-06-14 DIAGNOSIS — Z99.2 ESRD ON DIALYSIS (HCC): ICD-10-CM

## 2024-06-14 DIAGNOSIS — R93.5 ABNORMAL ABDOMINAL CT SCAN: ICD-10-CM

## 2024-06-14 DIAGNOSIS — R50.9 FEVER, UNSPECIFIED FEVER CAUSE: ICD-10-CM

## 2024-06-14 DIAGNOSIS — N18.6 ESRD ON DIALYSIS (HCC): ICD-10-CM

## 2024-06-14 LAB
ALBUMIN SERPL-MCNC: 2.1 G/DL (ref 3.4–5)
ALBUMIN/GLOB SERPL: 0.5 (ref 0.8–1.7)
ALP SERPL-CCNC: 87 U/L (ref 45–117)
ALT SERPL-CCNC: 10 U/L (ref 13–56)
ANION GAP SERPL CALC-SCNC: 4 MMOL/L (ref 3–18)
AST SERPL-CCNC: 8 U/L (ref 10–38)
BASOPHILS # BLD: 0 K/UL (ref 0–0.1)
BASOPHILS NFR BLD: 0 % (ref 0–2)
BILIRUB SERPL-MCNC: 0.4 MG/DL (ref 0.2–1)
BUN SERPL-MCNC: 18 MG/DL (ref 7–18)
BUN/CREAT SERPL: 4 (ref 12–20)
CALCIUM SERPL-MCNC: 8.4 MG/DL (ref 8.5–10.1)
CHLORIDE SERPL-SCNC: 98 MMOL/L (ref 100–111)
CO2 SERPL-SCNC: 33 MMOL/L (ref 21–32)
CREAT SERPL-MCNC: 5.03 MG/DL (ref 0.6–1.3)
DIFFERENTIAL METHOD BLD: ABNORMAL
EOSINOPHIL # BLD: 0 K/UL (ref 0–0.4)
EOSINOPHIL NFR BLD: 0 % (ref 0–5)
ERYTHROCYTE [DISTWIDTH] IN BLOOD BY AUTOMATED COUNT: 17.4 % (ref 11.6–14.5)
GLOBULIN SER CALC-MCNC: 4.5 G/DL (ref 2–4)
GLUCOSE SERPL-MCNC: 103 MG/DL (ref 74–99)
HCG SERPL QL: NEGATIVE
HCT VFR BLD AUTO: 25 % (ref 35–45)
HGB BLD-MCNC: 7.5 G/DL (ref 12–16)
IMM GRANULOCYTES # BLD AUTO: 0.1 K/UL (ref 0–0.04)
IMM GRANULOCYTES NFR BLD AUTO: 1 % (ref 0–0.5)
LACTATE BLD-SCNC: 0.64 MMOL/L (ref 0.4–2)
LYMPHOCYTES # BLD: 0.6 K/UL (ref 0.9–3.6)
LYMPHOCYTES NFR BLD: 4 % (ref 21–52)
MCH RBC QN AUTO: 27.5 PG (ref 24–34)
MCHC RBC AUTO-ENTMCNC: 30 G/DL (ref 31–37)
MCV RBC AUTO: 91.6 FL (ref 78–100)
MONOCYTES # BLD: 1.2 K/UL (ref 0.05–1.2)
MONOCYTES NFR BLD: 7 % (ref 3–10)
NEUTS SEG # BLD: 14.5 K/UL (ref 1.8–8)
NEUTS SEG NFR BLD: 88 % (ref 40–73)
NRBC # BLD: 0 K/UL (ref 0–0.01)
NRBC BLD-RTO: 0 PER 100 WBC
PLATELET # BLD AUTO: 193 K/UL (ref 135–420)
PMV BLD AUTO: 12.9 FL (ref 9.2–11.8)
POTASSIUM SERPL-SCNC: 3.6 MMOL/L (ref 3.5–5.5)
PROT SERPL-MCNC: 6.6 G/DL (ref 6.4–8.2)
RBC # BLD AUTO: 2.73 M/UL (ref 4.2–5.3)
SODIUM SERPL-SCNC: 135 MMOL/L (ref 136–145)
TROPONIN I SERPL HS-MCNC: 18 NG/L (ref 0–54)
WBC # BLD AUTO: 16.4 K/UL (ref 4.6–13.2)

## 2024-06-14 PROCEDURE — 6360000004 HC RX CONTRAST MEDICATION: Performed by: EMERGENCY MEDICINE

## 2024-06-14 PROCEDURE — 96375 TX/PRO/DX INJ NEW DRUG ADDON: CPT

## 2024-06-14 PROCEDURE — 85025 COMPLETE CBC W/AUTO DIFF WBC: CPT

## 2024-06-14 PROCEDURE — 87040 BLOOD CULTURE FOR BACTERIA: CPT

## 2024-06-14 PROCEDURE — 84484 ASSAY OF TROPONIN QUANT: CPT

## 2024-06-14 PROCEDURE — 96376 TX/PRO/DX INJ SAME DRUG ADON: CPT

## 2024-06-14 PROCEDURE — 71045 X-RAY EXAM CHEST 1 VIEW: CPT

## 2024-06-14 PROCEDURE — 84145 PROCALCITONIN (PCT): CPT

## 2024-06-14 PROCEDURE — 83605 ASSAY OF LACTIC ACID: CPT

## 2024-06-14 PROCEDURE — 6370000000 HC RX 637 (ALT 250 FOR IP): Performed by: EMERGENCY MEDICINE

## 2024-06-14 PROCEDURE — 84703 CHORIONIC GONADOTROPIN ASSAY: CPT

## 2024-06-14 PROCEDURE — 96365 THER/PROPH/DIAG IV INF INIT: CPT

## 2024-06-14 PROCEDURE — 99285 EMERGENCY DEPT VISIT HI MDM: CPT

## 2024-06-14 PROCEDURE — 2580000003 HC RX 258: Performed by: EMERGENCY MEDICINE

## 2024-06-14 PROCEDURE — 74177 CT ABD & PELVIS W/CONTRAST: CPT

## 2024-06-14 PROCEDURE — 6360000002 HC RX W HCPCS: Performed by: EMERGENCY MEDICINE

## 2024-06-14 PROCEDURE — 80053 COMPREHEN METABOLIC PANEL: CPT

## 2024-06-14 PROCEDURE — 51701 INSERT BLADDER CATHETER: CPT

## 2024-06-14 PROCEDURE — 93005 ELECTROCARDIOGRAM TRACING: CPT | Performed by: EMERGENCY MEDICINE

## 2024-06-14 RX ORDER — 0.9 % SODIUM CHLORIDE 0.9 %
30 INTRAVENOUS SOLUTION INTRAVENOUS ONCE
Status: DISCONTINUED | OUTPATIENT
Start: 2024-06-14 | End: 2024-06-14

## 2024-06-14 RX ORDER — HYDROMORPHONE HYDROCHLORIDE 1 MG/ML
1 INJECTION, SOLUTION INTRAMUSCULAR; INTRAVENOUS; SUBCUTANEOUS
Status: COMPLETED | OUTPATIENT
Start: 2024-06-14 | End: 2024-06-14

## 2024-06-14 RX ORDER — ACETAMINOPHEN 500 MG
1000 TABLET ORAL
Status: COMPLETED | OUTPATIENT
Start: 2024-06-14 | End: 2024-06-14

## 2024-06-14 RX ORDER — MORPHINE SULFATE 4 MG/ML
4 INJECTION, SOLUTION INTRAMUSCULAR; INTRAVENOUS
Status: COMPLETED | OUTPATIENT
Start: 2024-06-14 | End: 2024-06-14

## 2024-06-14 RX ADMIN — ACETAMINOPHEN 1000 MG: 500 TABLET ORAL at 18:05

## 2024-06-14 RX ADMIN — MORPHINE SULFATE 4 MG: 4 INJECTION, SOLUTION INTRAMUSCULAR; INTRAVENOUS at 19:37

## 2024-06-14 RX ADMIN — PIPERACILLIN SODIUM AND TAZOBACTAM SODIUM 4500 MG: 4; .5 INJECTION, POWDER, LYOPHILIZED, FOR SOLUTION INTRAVENOUS at 18:06

## 2024-06-14 RX ADMIN — IOPAMIDOL 80 ML: 612 INJECTION, SOLUTION INTRAVENOUS at 19:11

## 2024-06-14 RX ADMIN — VANCOMYCIN HYDROCHLORIDE 1000 MG: 1 INJECTION, POWDER, LYOPHILIZED, FOR SOLUTION INTRAVENOUS at 22:15

## 2024-06-14 RX ADMIN — HYDROMORPHONE HYDROCHLORIDE 1 MG: 1 INJECTION, SOLUTION INTRAMUSCULAR; INTRAVENOUS; SUBCUTANEOUS at 22:08

## 2024-06-14 RX ADMIN — SODIUM CHLORIDE 1914 ML: 9 INJECTION, SOLUTION INTRAVENOUS at 18:05

## 2024-06-14 ASSESSMENT — PAIN DESCRIPTION - ORIENTATION
ORIENTATION: LOWER
ORIENTATION: LOWER

## 2024-06-14 ASSESSMENT — PAIN DESCRIPTION - DESCRIPTORS
DESCRIPTORS: ACHING;STABBING
DESCRIPTORS: ACHING;STABBING

## 2024-06-14 ASSESSMENT — ENCOUNTER SYMPTOMS
ABDOMINAL PAIN: 1
NAUSEA: 0
EYES NEGATIVE: 1
ABDOMINAL DISTENTION: 1
ALLERGIC/IMMUNOLOGIC NEGATIVE: 1
SHORTNESS OF BREATH: 1
DIARRHEA: 0
VOMITING: 0

## 2024-06-14 ASSESSMENT — PAIN DESCRIPTION - LOCATION
LOCATION: BACK
LOCATION: BACK

## 2024-06-14 ASSESSMENT — PAIN SCALES - GENERAL
PAINLEVEL_OUTOF10: 10

## 2024-06-14 NOTE — ED PROVIDER NOTES
[facility] EMERGENCY DEPT  EMERGENCY DEPARTMENT ENCOUNTER      Pt Name: Sandrita Park  MRN: [id number]  Birthdate 1995  Date of evaluation: 6/14/2024  Provider: TUAN Powell  1:29 AM    CHIEF COMPLAINT       Chief Complaint   Patient presents with    Fever         HISTORY OF PRESENT ILLNESS    Sandrita Park is a 28 y.o. female who presents to the emergency department with complaint of abdominal pain fever feeling like there is too much fluid in her body.  Patient had dialysis today off of her usual schedule because of the fact that she will have transportation tomorrow and has childcare issues for tomorrow.  She normally has dialysis Tuesday Thursdays and Saturdays and her nephrologist is \"Dr. SKY; I cannot pronounce his name he is foreign\".  His when she spells the name of his doctor it is Randy.  Previously on peritoneal dialysis patient now has hemodialysis with the fistula.  History of fibromyalgia hypertension hyperlipidemia hypothyroidism Lupus mitral valve regurgitation bipolar CHF and asthma, and anemia.  Patient says she is not sure when her fever began.    May 8, 2024 CT at [facility] impression:  1. Redemonstration of the large cystic ovarian lesion in the right hemipelvis measuring approximately 5.2 x 3.4 cm.  2. Large amount of fluid within the peritoneal cavity. This fluid is not free-flowing. There is peritoneal enhancement. Calcified peritoneal nodule along the left anterior abdominal wall is reidentified. Findings are concerning for metastatic disease from the ovarian lesion. Clinical correlation strongly advised.  3. Additional comments and findings are as above. believes she has seen a gastroenterologist.  She has had a paracentesis at least once at [facility].  She cannot remember the name of the oncologist.    Review of notes shows that patient has had Dr. Jonny Castaneda as Gyn-Onc provider.  She had a paracentesis in [facility] in May with a significant WBC count after the  paracentesis but the culture of the fluid which was sent was negative for infection and cytology was also negative.      HPI    Nursing Notes were reviewed.    REVIEW OF SYSTEMS       Review of Systems   Constitutional:  Positive for fever.   HENT: Negative.     Eyes: Negative.    Respiratory:  Positive for shortness of breath.    Cardiovascular:  Negative for chest pain.   Gastrointestinal:  Positive for abdominal distention and abdominal pain. Negative for diarrhea, nausea and vomiting.   Endocrine: Negative.    Genitourinary: Negative.    Musculoskeletal: Negative.    Skin: Negative.    Allergic/Immunologic: Negative.    Neurological: Negative.    Hematological: Negative.    Psychiatric/Behavioral: Negative.         Except as noted above the remainder of the review of systems was reviewed and negative.       PAST MEDICAL HISTORY     Past Medical History:   Diagnosis Date    A-V fistula (HCC)     LEFT SIDE     Anxiety and depression     Asthma     Chronic kidney disease     ESRD- [facility]    Chronic pain     GERD (gastroesophageal reflux disease)     History of blood transfusion 2017, 2020    Hypertension     Lupus (HCC)     Psychiatric disorder     Thyroid disease     hypo         SURGICAL HISTORY       Past Surgical History:   Procedure Laterality Date    HEENT  2021    oral surgery    IR TUNNELED INTRAPERITNL CATH W/IMAG      [place] ABDOMEN SURGERY PROC UNLISTED  2015    Drain a boil    UROLOGICAL SURGERY  20189, 2019    biopsies of kidneys    VASCULAR SURGERY Left 2020    dialysis shunt (arm)         CURRENT MEDICATIONS       Previous Medications    ALBUTEROL SULFATE HFA (PROVENTIL;VENTOLIN;PROAIR) 108 (90 BASE) MCG/ACT INHALER    Inhale into the lungs as needed    AMLODIPINE (NORVASC) 10 MG TABLET    Take 10 mg by mouth    BUDESONIDE (PULMICORT) 0.25 MG/2ML NEBULIZER SUSPENSION    Inhale into the lungs    BUMETANIDE (BUMEX) 2 MG TABLET    Take 2 mg by mouth daily    BUSPIRONE (BUSPAR) 10 MG TABLET    Take 10 mg

## 2024-06-14 NOTE — ED TRIAGE NOTES
Pt ambulatory to ed with complains of pain all over. Patient does T, Th, Sat dialysis, had dialysis yesterday and today. Hx of kidney disease and lupus, states she has pain \"all over\". Pt unable to elaborate on pain further than that.

## 2024-06-15 ENCOUNTER — APPOINTMENT (OUTPATIENT)
Facility: HOSPITAL | Age: 29
End: 2024-06-15
Payer: COMMERCIAL

## 2024-06-15 VITALS
HEART RATE: 106 BPM | BODY MASS INDEX: 22.6 KG/M2 | HEIGHT: 66 IN | OXYGEN SATURATION: 100 % | WEIGHT: 140.65 LBS | DIASTOLIC BLOOD PRESSURE: 90 MMHG | RESPIRATION RATE: 18 BRPM | SYSTOLIC BLOOD PRESSURE: 169 MMHG | TEMPERATURE: 99 F

## 2024-06-15 LAB
APPEARANCE UR: ABNORMAL
BACTERIA URNS QL MICRO: ABNORMAL /HPF
BILIRUB UR QL: NEGATIVE
COLOR UR: ABNORMAL
EPITH CASTS URNS QL MICRO: ABNORMAL /LPF (ref 0–5)
GLUCOSE UR STRIP.AUTO-MCNC: 100 MG/DL
HGB UR QL STRIP: ABNORMAL
HYALINE CASTS URNS QL MICRO: ABNORMAL /LPF (ref 0–2)
KETONES UR QL STRIP.AUTO: NEGATIVE MG/DL
LEUKOCYTE ESTERASE UR QL STRIP.AUTO: ABNORMAL
NITRITE UR QL STRIP.AUTO: NEGATIVE
PH UR STRIP: >8.5 [PH] (ref 5–8)
PROCALCITONIN SERPL-MCNC: 14.48 NG/ML
PROT UR STRIP-MCNC: 300 MG/DL
RBC #/AREA URNS HPF: ABNORMAL /HPF (ref 0–5)
SP GR UR REFRACTOMETRY: 1.01 (ref 1–1.03)
UROBILINOGEN UR QL STRIP.AUTO: 0.2 EU/DL (ref 0.2–1)
WBC URNS QL MICRO: ABNORMAL /HPF (ref 0–5)

## 2024-06-15 PROCEDURE — 87086 URINE CULTURE/COLONY COUNT: CPT

## 2024-06-15 PROCEDURE — 6360000002 HC RX W HCPCS: Performed by: EMERGENCY MEDICINE

## 2024-06-15 PROCEDURE — 6360000002 HC RX W HCPCS

## 2024-06-15 PROCEDURE — 2580000003 HC RX 258: Performed by: EMERGENCY MEDICINE

## 2024-06-15 PROCEDURE — 96376 TX/PRO/DX INJ SAME DRUG ADON: CPT

## 2024-06-15 PROCEDURE — 6360000002 HC RX W HCPCS: Performed by: STUDENT IN AN ORGANIZED HEALTH CARE EDUCATION/TRAINING PROGRAM

## 2024-06-15 PROCEDURE — 76705 ECHO EXAM OF ABDOMEN: CPT

## 2024-06-15 PROCEDURE — 96375 TX/PRO/DX INJ NEW DRUG ADDON: CPT

## 2024-06-15 PROCEDURE — 6370000000 HC RX 637 (ALT 250 FOR IP): Performed by: EMERGENCY MEDICINE

## 2024-06-15 PROCEDURE — 81001 URINALYSIS AUTO W/SCOPE: CPT

## 2024-06-15 RX ORDER — HYDROMORPHONE HYDROCHLORIDE 1 MG/ML
1 INJECTION, SOLUTION INTRAMUSCULAR; INTRAVENOUS; SUBCUTANEOUS
Status: COMPLETED | OUTPATIENT
Start: 2024-06-15 | End: 2024-06-15

## 2024-06-15 RX ORDER — 0.9 % SODIUM CHLORIDE 0.9 %
1000 INTRAVENOUS SOLUTION INTRAVENOUS ONCE
Status: DISCONTINUED | OUTPATIENT
Start: 2024-06-15 | End: 2024-06-15

## 2024-06-15 RX ORDER — SPIRONOLACTONE 25 MG/1
25 TABLET ORAL DAILY
COMMUNITY

## 2024-06-15 RX ORDER — ACETAMINOPHEN 500 MG
1000 TABLET ORAL
Status: COMPLETED | OUTPATIENT
Start: 2024-06-15 | End: 2024-06-15

## 2024-06-15 RX ORDER — HYDROMORPHONE HYDROCHLORIDE 1 MG/ML
0.5 INJECTION, SOLUTION INTRAMUSCULAR; INTRAVENOUS; SUBCUTANEOUS ONCE
Status: COMPLETED | OUTPATIENT
Start: 2024-06-15 | End: 2024-06-15

## 2024-06-15 RX ORDER — PREDNISONE 10 MG/1
10 TABLET ORAL DAILY
COMMUNITY

## 2024-06-15 RX ORDER — FENTANYL CITRATE 50 UG/ML
INJECTION, SOLUTION INTRAMUSCULAR; INTRAVENOUS
Status: COMPLETED
Start: 2024-06-15 | End: 2024-06-15

## 2024-06-15 RX ORDER — HYDROMORPHONE HYDROCHLORIDE 1 MG/ML
0.5 INJECTION, SOLUTION INTRAMUSCULAR; INTRAVENOUS; SUBCUTANEOUS
Status: COMPLETED | OUTPATIENT
Start: 2024-06-15 | End: 2024-06-15

## 2024-06-15 RX ORDER — KETOROLAC TROMETHAMINE 15 MG/ML
15 INJECTION, SOLUTION INTRAMUSCULAR; INTRAVENOUS ONCE
Status: DISCONTINUED | OUTPATIENT
Start: 2024-06-15 | End: 2024-06-15 | Stop reason: HOSPADM

## 2024-06-15 RX ORDER — FENTANYL CITRATE 50 UG/ML
50 INJECTION, SOLUTION INTRAMUSCULAR; INTRAVENOUS
Status: COMPLETED | OUTPATIENT
Start: 2024-06-15 | End: 2024-06-15

## 2024-06-15 RX ORDER — SACUBITRIL AND VALSARTAN 97; 103 MG/1; MG/1
1 TABLET, FILM COATED ORAL 2 TIMES DAILY
COMMUNITY

## 2024-06-15 RX ORDER — LORAZEPAM 1 MG/1
1 TABLET ORAL ONCE
Status: COMPLETED | OUTPATIENT
Start: 2024-06-15 | End: 2024-06-15

## 2024-06-15 RX ORDER — CLONIDINE HYDROCHLORIDE 0.1 MG/1
0.1 TABLET ORAL 2 TIMES DAILY
COMMUNITY
Start: 2022-12-09

## 2024-06-15 RX ADMIN — FENTANYL CITRATE 50 MCG: 50 INJECTION, SOLUTION INTRAMUSCULAR; INTRAVENOUS at 05:47

## 2024-06-15 RX ADMIN — PIPERACILLIN AND TAZOBACTAM 3375 MG: 3; .375 INJECTION, POWDER, LYOPHILIZED, FOR SOLUTION INTRAVENOUS at 05:47

## 2024-06-15 RX ADMIN — HYDROMORPHONE HYDROCHLORIDE 0.5 MG: 1 INJECTION, SOLUTION INTRAMUSCULAR; INTRAVENOUS; SUBCUTANEOUS at 11:24

## 2024-06-15 RX ADMIN — LORAZEPAM 1 MG: 1 TABLET ORAL at 03:32

## 2024-06-15 RX ADMIN — ACETAMINOPHEN 1000 MG: 500 TABLET ORAL at 05:48

## 2024-06-15 RX ADMIN — HYDROMORPHONE HYDROCHLORIDE 0.5 MG: 1 INJECTION, SOLUTION INTRAMUSCULAR; INTRAVENOUS; SUBCUTANEOUS at 08:11

## 2024-06-15 RX ADMIN — FENTANYL CITRATE 50 MCG: 50 INJECTION INTRAMUSCULAR; INTRAVENOUS at 05:47

## 2024-06-15 RX ADMIN — HYDROMORPHONE HYDROCHLORIDE 1 MG: 1 INJECTION, SOLUTION INTRAMUSCULAR; INTRAVENOUS; SUBCUTANEOUS at 01:54

## 2024-06-15 RX ADMIN — HYDROMORPHONE HYDROCHLORIDE 1 MG: 1 INJECTION, SOLUTION INTRAMUSCULAR; INTRAVENOUS; SUBCUTANEOUS at 04:26

## 2024-06-15 ASSESSMENT — PAIN DESCRIPTION - DESCRIPTORS
DESCRIPTORS: CRAMPING
DESCRIPTORS: STABBING;ACHING

## 2024-06-15 ASSESSMENT — PAIN DESCRIPTION - ORIENTATION: ORIENTATION: LOWER

## 2024-06-15 ASSESSMENT — PAIN SCALES - GENERAL
PAINLEVEL_OUTOF10: 7
PAINLEVEL_OUTOF10: 7
PAINLEVEL_OUTOF10: 9
PAINLEVEL_OUTOF10: 10
PAINLEVEL_OUTOF10: 10

## 2024-06-15 ASSESSMENT — PAIN DESCRIPTION - LOCATION
LOCATION: ABDOMEN;BACK
LOCATION: ABDOMEN;BACK

## 2024-06-15 NOTE — ED NOTES
Pt refused fluid at this time. Pt refused straight cath stating \"I am a virgin and you are not sticking me with a tube down there\".

## 2024-06-15 NOTE — PROGRESS NOTES
[facility]   Pharmacy Pharmacokinetic Monitoring Service - Vancomycin     Sandrita Park is a 28 y.o. female starting on vancomycin therapy for Sepsis. Pharmacy consulted by Dr.Morales Ruano for monitoring and adjustment.    Target Concentration: Dosing based on anticipated concentration <15 mg/L due to renal impairment/insufficiency    Additional Antimicrobials: zosyn    Pertinent Laboratory Values:   Wt Readings from Last 1 Encounters:   06/14/24 63.8 kg (140 lb 10.5 oz)     Temp Readings from Last 1 Encounters:   06/14/24 99.2 °F (37.3 °C)     Estimated Creatinine Clearance: 16 mL/min (A) (based on SCr of 5.03 mg/dL (H)).  Recent Labs     06/14/24  1737   CREATININE 5.03*   BUN 18   WBC 16.4*     Plan:  Concentration-guided dosing due to renal impairment/insufficiency.patient receives HD.  Start vancomycin:Vancomycin 1000 mg IV loading dose was given(06/14/24@2215).This will be followed by Vancomycin 750mg IV Once after dialysis on 06/15.  Pharmacy will continue to monitor patient and adjust therapy as indicated    Thank you for the consult,  Laura Tineo RPH  6/15/2024 4:28 AM

## 2024-06-15 NOTE — ED NOTES
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  Now with fever today to 102  My exam shows uncomfortable appearing.  Abdomen distended, tender with guarding.  Imp/plan: septic w/u shows normal lactic with incr WBC and chronic anemia  Lytes and renal status stable.  CT with worsening ascites, CXR clear.  Would strongly consider SBP.  ABX started ; needs consultation with oncology, admission, prob ascites aspirate with gram stain and culture  I personally saw and performed a substantive portion of the visit including the medical decision making and have personally seen and examined the patient, reviewed the WINNIE's note and agree with findings and plan.  Written by MD Av Abarca Jeffrey D, MD  06/15/24 0229

## 2024-06-15 NOTE — ED PROVIDER NOTES
standing at side of bed, she has been waiting to eat. She would like more heating pads. She is frustrated she has been waiting so long for transfer. She states her abdomen and back pain is worse. She denies vomiting. Her HR is low 100s. On my reassessment, patient does not warrant further fluid bolus for sepsis at this time, clinically I agree w/ <30cc/kg rationale.  [RK]      ED Course User Index  [JM] Alden Crowe DO  [RK] Panchito Mendenhall DO       Medical Decision Making     Discussion:    28 y.o. female signed out to me by Dr. Crowe pending transfer to [facility] for abdominal for massive fluid collection or cystic lesion in the mid abdomen. There was concern this could be a septic process WC was 16.4 although lactic only 0.64, patient met SIRS with also having tachycardia and intiial  and temp 102.2. UA showed blood and LE but no nitrites. BC 1 and 2 thus far no growth. CT abdomen/pelvis otherwise nonacute. CMP Cr 5.03. patient has hx of CKD this is similar to baseline. Patient has been in the ER this point 15 hours. Patient has received vancomycin dosed by pharmacy given CKD last night and a dose just recently this AM. Patient received zosyn and this is ordered q12 hours. Patient has received tylenol/dilaudid for pain. Will confirm with nursing staff do not see any fluids ordered in MAR likely 2/2 patient's chronic ESRD will assess if you needs any fluid bolus. No emergent need for dialysis no recent missed session.     Update  Patient frustrated w/ wait time for ambulance but doing well. Provided with food. Pain medicine available prn at patient request and plan to give some prior to ambulance ride. No further IVF required. Doubt her abdominal process is sepsis. Abdominal US was ordered to rule out any ascites none seen.      Critical Care Time: None    Panchito Mendenhall DO      Diagnosis and Disposition     DISPOSITION:  DISPOSITION Decision To Transfer 06/14/2024 11:44:26 PM      CLINICAL  IMPRESSION:  1. Abdominal distension    2. Fever, unspecified fever cause    3. Abnormal abdominal CT scan    4. ESRD on dialysis (HCC)        PLAN:  Transfer to [facility]       Dragon Disclaimer     Please note that this dictation was completed with Zenter, the computer voice recognition software.  Quite often unanticipated grammatical, syntax, homophones, and other interpretive errors are inadvertently transcribed by the computer software.  Please disregard these errors.  Please excuse any errors that have escaped final proofreading.    Panchito Mendenhall DO  (Electronically signed)          Panchito Mendenhall DO  06/15/24 9594

## 2024-06-15 NOTE — ED NOTES
Call light answered. Pulse ox adjusted, warm blanket given, water given. Patient requesting pain medication.

## 2024-06-15 NOTE — PROGRESS NOTES
1:22 AM : Pt care transferred to me from Sydney Mayo  ,ED provider. History of patient complaint(s), available diagnostic reports and current treatment plan has been discussed thoroughly.   Bedside rounding on patient occured : No .  Intended disposition of patient : Transfer  Pending diagnostics reports and/or labs (please list):   Concern for sepsis, massive pelvic fluid collection, unclear if infectious versus malignant.  Oncologist recommended [facility] for interventional radiology, gynecologic consultation.  Broad-spectrum antibiotics given, no other source of infection found although urinalysis still pending.    0230: Spoke to gynecology oncologist, Dr. Donohue who accept the patient for transfer to [facility] however there were no beds available thus will likely be transferred later this morning.  Will continue broad-spectrum IV antibiotic therapy with Zosyn and vancomycin until we can narrow the source.  Will still need to obtain urinalysis sample.  Patient signed out to Dr. Mendenhall at shift change.    Alden Crowe DO  Emergency Physician  .S. Acute Care [facility]

## 2024-06-16 LAB
BACTERIA SPEC CULT: NORMAL
EKG ATRIAL RATE: 98 BPM
EKG DIAGNOSIS: NORMAL
EKG P AXIS: 77 DEGREES
EKG P-R INTERVAL: 152 MS
EKG Q-T INTERVAL: 392 MS
EKG QRS DURATION: 88 MS
EKG QTC CALCULATION (BAZETT): 500 MS
EKG R AXIS: 80 DEGREES
EKG T AXIS: 41 DEGREES
EKG VENTRICULAR RATE: 98 BPM
SERVICE CMNT-IMP: NORMAL

## 2024-12-06 ENCOUNTER — HOSPITAL ENCOUNTER (INPATIENT)
Facility: HOSPITAL | Age: 29
LOS: 3 days | Discharge: HOME | DRG: 291 | End: 2024-12-09
Attending: EMERGENCY MEDICINE | Admitting: HOSPITALIST
Payer: MEDICARE

## 2024-12-06 ENCOUNTER — APPOINTMENT (OUTPATIENT)
Facility: HOSPITAL | Age: 29
DRG: 291 | End: 2024-12-06
Payer: MEDICARE

## 2024-12-06 DIAGNOSIS — N18.6 ESRD (END STAGE RENAL DISEASE) (HCC): ICD-10-CM

## 2024-12-06 DIAGNOSIS — R79.89 ELEVATED TROPONIN: ICD-10-CM

## 2024-12-06 DIAGNOSIS — E87.70 HYPERVOLEMIA, UNSPECIFIED HYPERVOLEMIA TYPE: Primary | ICD-10-CM

## 2024-12-06 DIAGNOSIS — I42.8 NONISCHEMIC CARDIOMYOPATHY (HCC): ICD-10-CM

## 2024-12-06 PROBLEM — N18.9 ANEMIA IN CHRONIC KIDNEY DISEASE (CKD): Status: ACTIVE | Noted: 2022-04-19

## 2024-12-06 PROBLEM — D63.1 ANEMIA IN CHRONIC KIDNEY DISEASE (CKD): Status: ACTIVE | Noted: 2022-04-19

## 2024-12-06 PROBLEM — Z91.199 POOR COMPLIANCE: Status: ACTIVE | Noted: 2024-12-06

## 2024-12-06 PROBLEM — G93.41 ACUTE METABOLIC ENCEPHALOPATHY: Status: ACTIVE | Noted: 2024-12-06

## 2024-12-06 LAB
ALBUMIN SERPL-MCNC: 2.7 G/DL (ref 3.4–5)
ALBUMIN/GLOB SERPL: 0.6 (ref 0.8–1.7)
ALP SERPL-CCNC: 64 U/L (ref 45–117)
ALT SERPL-CCNC: 13 U/L (ref 13–56)
ANION GAP SERPL CALC-SCNC: 19 MMOL/L (ref 3–18)
AST SERPL-CCNC: 5 U/L (ref 10–38)
BASOPHILS # BLD: 0 K/UL (ref 0–0.1)
BASOPHILS NFR BLD: 0 % (ref 0–2)
BILIRUB SERPL-MCNC: 0.4 MG/DL (ref 0.2–1)
BUN SERPL-MCNC: 143 MG/DL (ref 7–18)
BUN/CREAT SERPL: 6 (ref 12–20)
CALCIUM SERPL-MCNC: 7.9 MG/DL (ref 8.5–10.1)
CHLORIDE SERPL-SCNC: 100 MMOL/L (ref 100–111)
CO2 SERPL-SCNC: 17 MMOL/L (ref 21–32)
CREAT SERPL-MCNC: 24.2 MG/DL (ref 0.6–1.3)
DIFFERENTIAL METHOD BLD: ABNORMAL
EKG ATRIAL RATE: 75 BPM
EKG DIAGNOSIS: NORMAL
EKG P AXIS: 66 DEGREES
EKG P-R INTERVAL: 166 MS
EKG Q-T INTERVAL: 436 MS
EKG QRS DURATION: 90 MS
EKG QTC CALCULATION (BAZETT): 486 MS
EKG R AXIS: 67 DEGREES
EKG T AXIS: 46 DEGREES
EKG VENTRICULAR RATE: 75 BPM
EOSINOPHIL # BLD: 0.5 K/UL (ref 0–0.4)
EOSINOPHIL NFR BLD: 4 % (ref 0–5)
ERYTHROCYTE [DISTWIDTH] IN BLOOD BY AUTOMATED COUNT: 19.7 % (ref 11.6–14.5)
GLOBULIN SER CALC-MCNC: 4.3 G/DL (ref 2–4)
GLUCOSE SERPL-MCNC: 114 MG/DL (ref 74–99)
HBV SURFACE AG SER QL: <0.1 INDEX
HBV SURFACE AG SER QL: NEGATIVE
HCT VFR BLD AUTO: 31.3 % (ref 35–45)
HGB BLD-MCNC: 10.3 G/DL (ref 12–16)
IMM GRANULOCYTES # BLD AUTO: 0.1 K/UL (ref 0–0.04)
IMM GRANULOCYTES NFR BLD AUTO: 1 % (ref 0–0.5)
LYMPHOCYTES # BLD: 1.1 K/UL (ref 0.9–3.6)
LYMPHOCYTES NFR BLD: 9 % (ref 21–52)
MCH RBC QN AUTO: 27.1 PG (ref 24–34)
MCHC RBC AUTO-ENTMCNC: 32.9 G/DL (ref 31–37)
MCV RBC AUTO: 82.4 FL (ref 78–100)
MONOCYTES # BLD: 1.1 K/UL (ref 0.05–1.2)
MONOCYTES NFR BLD: 9 % (ref 3–10)
NEUTS SEG # BLD: 9.1 K/UL (ref 1.8–8)
NEUTS SEG NFR BLD: 77 % (ref 40–73)
NRBC # BLD: 0 K/UL (ref 0–0.01)
NRBC BLD-RTO: 0 PER 100 WBC
NT PRO BNP: ABNORMAL PG/ML (ref 0–450)
PHOSPHATE SERPL-MCNC: 16 MG/DL (ref 2.5–4.9)
PLATELET # BLD AUTO: 87 K/UL (ref 135–420)
PLATELET COMMENT: ABNORMAL
POTASSIUM SERPL-SCNC: 5.2 MMOL/L (ref 3.5–5.5)
PROT SERPL-MCNC: 7 G/DL (ref 6.4–8.2)
RBC # BLD AUTO: 3.8 M/UL (ref 4.2–5.3)
RBC MORPH BLD: ABNORMAL
SODIUM SERPL-SCNC: 136 MMOL/L (ref 136–145)
TROPONIN I SERPL HS-MCNC: 774 NG/L (ref 0–54)
WBC # BLD AUTO: 11.9 K/UL (ref 4.6–13.2)

## 2024-12-06 PROCEDURE — 6370000000 HC RX 637 (ALT 250 FOR IP): Performed by: STUDENT IN AN ORGANIZED HEALTH CARE EDUCATION/TRAINING PROGRAM

## 2024-12-06 PROCEDURE — 83880 ASSAY OF NATRIURETIC PEPTIDE: CPT

## 2024-12-06 PROCEDURE — 85025 COMPLETE CBC W/AUTO DIFF WBC: CPT

## 2024-12-06 PROCEDURE — 93005 ELECTROCARDIOGRAM TRACING: CPT | Performed by: EMERGENCY MEDICINE

## 2024-12-06 PROCEDURE — 86706 HEP B SURFACE ANTIBODY: CPT

## 2024-12-06 PROCEDURE — 90935 HEMODIALYSIS ONE EVALUATION: CPT

## 2024-12-06 PROCEDURE — 6360000002 HC RX W HCPCS: Performed by: HOSPITALIST

## 2024-12-06 PROCEDURE — 99285 EMERGENCY DEPT VISIT HI MDM: CPT

## 2024-12-06 PROCEDURE — 84484 ASSAY OF TROPONIN QUANT: CPT

## 2024-12-06 PROCEDURE — 84100 ASSAY OF PHOSPHORUS: CPT

## 2024-12-06 PROCEDURE — 5A1D70Z PERFORMANCE OF URINARY FILTRATION, INTERMITTENT, LESS THAN 6 HOURS PER DAY: ICD-10-PCS | Performed by: HOSPITALIST

## 2024-12-06 PROCEDURE — 71045 X-RAY EXAM CHEST 1 VIEW: CPT

## 2024-12-06 PROCEDURE — 1100000000 HC RM PRIVATE

## 2024-12-06 PROCEDURE — 87340 HEPATITIS B SURFACE AG IA: CPT

## 2024-12-06 PROCEDURE — 96375 TX/PRO/DX INJ NEW DRUG ADDON: CPT

## 2024-12-06 PROCEDURE — 6360000002 HC RX W HCPCS: Performed by: EMERGENCY MEDICINE

## 2024-12-06 PROCEDURE — 96374 THER/PROPH/DIAG INJ IV PUSH: CPT

## 2024-12-06 PROCEDURE — 6360000002 HC RX W HCPCS: Performed by: STUDENT IN AN ORGANIZED HEALTH CARE EDUCATION/TRAINING PROGRAM

## 2024-12-06 PROCEDURE — 6370000000 HC RX 637 (ALT 250 FOR IP): Performed by: HOSPITALIST

## 2024-12-06 PROCEDURE — 80053 COMPREHEN METABOLIC PANEL: CPT

## 2024-12-06 RX ORDER — IBUPROFEN 200 MG
3750 CAPSULE ORAL
Status: DISCONTINUED | OUTPATIENT
Start: 2024-12-06 | End: 2024-12-06

## 2024-12-06 RX ORDER — AMLODIPINE BESYLATE 5 MG/1
10 TABLET ORAL DAILY
Status: DISCONTINUED | OUTPATIENT
Start: 2024-12-06 | End: 2024-12-06

## 2024-12-06 RX ORDER — LEVOTHYROXINE SODIUM 100 UG/1
100 TABLET ORAL
Status: DISCONTINUED | OUTPATIENT
Start: 2024-12-07 | End: 2024-12-09 | Stop reason: HOSPADM

## 2024-12-06 RX ORDER — HYDROXYCHLOROQUINE SULFATE 200 MG/1
200 TABLET, FILM COATED ORAL DAILY
Status: DISCONTINUED | OUTPATIENT
Start: 2024-12-06 | End: 2024-12-09 | Stop reason: HOSPADM

## 2024-12-06 RX ORDER — LABETALOL HYDROCHLORIDE 5 MG/ML
20 INJECTION, SOLUTION INTRAVENOUS
Status: COMPLETED | OUTPATIENT
Start: 2024-12-06 | End: 2024-12-06

## 2024-12-06 RX ORDER — HEPARIN SODIUM 1000 [USP'U]/ML
2000 INJECTION, SOLUTION INTRAVENOUS; SUBCUTANEOUS PRN
Status: DISCONTINUED | OUTPATIENT
Start: 2024-12-06 | End: 2024-12-09 | Stop reason: HOSPADM

## 2024-12-06 RX ORDER — PREDNISONE 10 MG/1
10 TABLET ORAL DAILY
Status: DISCONTINUED | OUTPATIENT
Start: 2024-12-06 | End: 2024-12-09 | Stop reason: HOSPADM

## 2024-12-06 RX ORDER — ESCITALOPRAM OXALATE 10 MG/1
10 TABLET ORAL DAILY
Status: DISCONTINUED | OUTPATIENT
Start: 2024-12-06 | End: 2024-12-09 | Stop reason: HOSPADM

## 2024-12-06 RX ORDER — BUSPIRONE HYDROCHLORIDE 5 MG/1
5 TABLET ORAL 2 TIMES DAILY
Status: DISCONTINUED | OUTPATIENT
Start: 2024-12-06 | End: 2024-12-09 | Stop reason: HOSPADM

## 2024-12-06 RX ORDER — POLYETHYLENE GLYCOL 3350 17 G/17G
17 POWDER, FOR SOLUTION ORAL DAILY PRN
Status: DISCONTINUED | OUTPATIENT
Start: 2024-12-06 | End: 2024-12-09 | Stop reason: HOSPADM

## 2024-12-06 RX ORDER — CALCIUM CARBONATE 500 MG/1
1000 TABLET, CHEWABLE ORAL
Status: DISCONTINUED | OUTPATIENT
Start: 2024-12-06 | End: 2024-12-09 | Stop reason: HOSPADM

## 2024-12-06 RX ORDER — CARVEDILOL 12.5 MG/1
12.5 TABLET ORAL 2 TIMES DAILY
Status: DISCONTINUED | OUTPATIENT
Start: 2024-12-06 | End: 2024-12-08

## 2024-12-06 RX ORDER — BUMETANIDE 0.25 MG/ML
2 INJECTION, SOLUTION INTRAMUSCULAR; INTRAVENOUS ONCE
Status: COMPLETED | OUTPATIENT
Start: 2024-12-06 | End: 2024-12-06

## 2024-12-06 RX ORDER — CLONIDINE HYDROCHLORIDE 0.1 MG/1
0.1 TABLET ORAL 2 TIMES DAILY
Status: DISCONTINUED | OUTPATIENT
Start: 2024-12-06 | End: 2024-12-08

## 2024-12-06 RX ORDER — DIVALPROEX SODIUM 500 MG/1
500 TABLET, FILM COATED, EXTENDED RELEASE ORAL 2 TIMES DAILY
Status: DISCONTINUED | OUTPATIENT
Start: 2024-12-06 | End: 2024-12-09 | Stop reason: HOSPADM

## 2024-12-06 RX ORDER — HYDROMORPHONE HYDROCHLORIDE 1 MG/ML
0.25 INJECTION, SOLUTION INTRAMUSCULAR; INTRAVENOUS; SUBCUTANEOUS EVERY 4 HOURS PRN
Status: DISCONTINUED | OUTPATIENT
Start: 2024-12-06 | End: 2024-12-07

## 2024-12-06 RX ORDER — ALBUTEROL SULFATE 90 UG/1
1 INHALANT RESPIRATORY (INHALATION) EVERY 6 HOURS PRN
Status: DISCONTINUED | OUTPATIENT
Start: 2024-12-06 | End: 2024-12-09 | Stop reason: HOSPADM

## 2024-12-06 RX ORDER — BUDESONIDE 0.25 MG/2ML
0.25 INHALANT ORAL
Status: DISCONTINUED | OUTPATIENT
Start: 2024-12-06 | End: 2024-12-08

## 2024-12-06 RX ORDER — ONDANSETRON 2 MG/ML
4 INJECTION INTRAMUSCULAR; INTRAVENOUS EVERY 4 HOURS PRN
Status: DISCONTINUED | OUTPATIENT
Start: 2024-12-06 | End: 2024-12-09 | Stop reason: HOSPADM

## 2024-12-06 RX ORDER — PANTOPRAZOLE SODIUM 40 MG/1
40 TABLET, DELAYED RELEASE ORAL 2 TIMES DAILY
Status: DISCONTINUED | OUTPATIENT
Start: 2024-12-06 | End: 2024-12-09 | Stop reason: HOSPADM

## 2024-12-06 RX ORDER — LABETALOL HYDROCHLORIDE 5 MG/ML
10 INJECTION, SOLUTION INTRAVENOUS
Status: DISCONTINUED | OUTPATIENT
Start: 2024-12-06 | End: 2024-12-09 | Stop reason: HOSPADM

## 2024-12-06 RX ORDER — ONDANSETRON 4 MG/1
4 TABLET, ORALLY DISINTEGRATING ORAL EVERY 8 HOURS PRN
Status: DISCONTINUED | OUTPATIENT
Start: 2024-12-06 | End: 2024-12-09 | Stop reason: HOSPADM

## 2024-12-06 RX ORDER — BUMETANIDE 1 MG/1
2 TABLET ORAL 2 TIMES DAILY
Status: DISCONTINUED | OUTPATIENT
Start: 2024-12-06 | End: 2024-12-07

## 2024-12-06 RX ORDER — ACETAMINOPHEN 650 MG/1
650 SUPPOSITORY RECTAL EVERY 6 HOURS PRN
Status: DISCONTINUED | OUTPATIENT
Start: 2024-12-06 | End: 2024-12-09 | Stop reason: HOSPADM

## 2024-12-06 RX ORDER — BUMETANIDE 1 MG/1
2 TABLET ORAL DAILY
Status: DISCONTINUED | OUTPATIENT
Start: 2024-12-06 | End: 2024-12-06

## 2024-12-06 RX ORDER — SODIUM CHLORIDE 0.9 % (FLUSH) 0.9 %
5-40 SYRINGE (ML) INJECTION EVERY 12 HOURS SCHEDULED
Status: DISCONTINUED | OUTPATIENT
Start: 2024-12-06 | End: 2024-12-09 | Stop reason: HOSPADM

## 2024-12-06 RX ORDER — HEPARIN SODIUM 5000 [USP'U]/ML
5000 INJECTION, SOLUTION INTRAVENOUS; SUBCUTANEOUS EVERY 8 HOURS
Status: DISCONTINUED | OUTPATIENT
Start: 2024-12-06 | End: 2024-12-09 | Stop reason: HOSPADM

## 2024-12-06 RX ORDER — METOLAZONE 5 MG/1
5 TABLET ORAL DAILY
Status: DISCONTINUED | OUTPATIENT
Start: 2024-12-06 | End: 2024-12-08

## 2024-12-06 RX ORDER — ACETAMINOPHEN 325 MG/1
650 TABLET ORAL EVERY 6 HOURS PRN
Status: DISCONTINUED | OUTPATIENT
Start: 2024-12-06 | End: 2024-12-09 | Stop reason: HOSPADM

## 2024-12-06 RX ORDER — ONDANSETRON 2 MG/ML
4 INJECTION INTRAMUSCULAR; INTRAVENOUS EVERY 6 HOURS PRN
Status: DISCONTINUED | OUTPATIENT
Start: 2024-12-06 | End: 2024-12-09 | Stop reason: HOSPADM

## 2024-12-06 RX ADMIN — CARVEDILOL 12.5 MG: 12.5 TABLET, FILM COATED ORAL at 21:59

## 2024-12-06 RX ADMIN — BUMETANIDE 2 MG: 0.25 INJECTION INTRAMUSCULAR; INTRAVENOUS at 12:11

## 2024-12-06 RX ADMIN — CLONIDINE HYDROCHLORIDE 0.1 MG: 0.1 TABLET ORAL at 21:59

## 2024-12-06 RX ADMIN — HEPARIN SODIUM 2000 UNITS: 1000 INJECTION INTRAVENOUS; SUBCUTANEOUS at 18:00

## 2024-12-06 RX ADMIN — HYDROMORPHONE HYDROCHLORIDE 0.25 MG: 1 INJECTION, SOLUTION INTRAMUSCULAR; INTRAVENOUS; SUBCUTANEOUS at 20:34

## 2024-12-06 RX ADMIN — LABETALOL HYDROCHLORIDE 20 MG: 5 INJECTION, SOLUTION INTRAVENOUS at 12:12

## 2024-12-06 RX ADMIN — LABETALOL HYDROCHLORIDE 10 MG: 5 INJECTION INTRAVENOUS at 21:11

## 2024-12-06 NOTE — H&P
History & Physical    Patient: Sandrita Park MRN: [id number]  CSN: [id number]    YOB: 1995  Age: 29 y.o.  Sex: female      DOA: 12/6/2024    Chief Complaint:   Chief Complaint   Patient presents with    Abdominal Pain       Active Hospital Problems    Diagnosis Date Noted    Acute metabolic encephalopathy [G93.41] 12/06/2024     Priority: High    ESRD on dialysis (HCC) [N18.6, Z99.2]      Priority: High    Anemia in chronic kidney disease (CKD) [N18.9, D63.1] 04/19/2022     Priority: Medium    Fluid overload [E87.70] 12/06/2024    Poor compliance [Z91.199] 12/06/2024    Lupus nephritis (HCC) [M32.14] 04/19/2022    GERD (gastroesophageal reflux disease) [K21.9]     Hypertension [I10]           HPI:     Sandrita Park is a 29 y.o.  female with history of end-stage renal disease secondary to lupus nephritis on dialysis 3 times a week, patient is totally noncompliant with dialysis that she missed all her dialysis session for the last few weeks, presented today with weakness, fatigue, nausea and vomiting.  History was difficult to take with the patient since she is sleepy and confused to some degree.  She is able to answer questions appropriately if I repeated the questions multiple times.  She does not seem to be in any distress other than sleepy and weak.    Past Medical History:   Diagnosis Date    A-V fistula (HCC)     LEFT SIDE     Anxiety and depression     Asthma     Chronic kidney disease     ESRD- fresinius    Chronic pain     GERD (gastroesophageal reflux disease)     History of blood transfusion 2017, 2020    Hypertension     Lupus     Psychiatric disorder     Thyroid disease     hypo       Past Surgical History:   Procedure Laterality Date    HEENT  2021    oral surgery    IR TUNNELED INTRAPERITNL CATH W/IMAG      IN UNLISTED PROCEDURE ABDOMEN PERITONEUM & OMENTUM  2015    Drain a boil    UROLOGICAL SURGERY  20189, 2019    biopsies of kidneys    VASCULAR SURGERY Left 2020    dialysis shunt 
[FreeTextEntry1] : asked to see by Dr Saavedra for CKD\par pt is a 72 yo B woman hx HTN and DM x years, no retinopathy, obesity,CKD- baseline creatinine about 1.2 x years -- most recent labs from 6/2019 showed creat of 1.3, k 5.1, ur microalb 297 mg ,hco3 30, ca 9.8, alb 4 \par also recently diagnosed with local  sarcoid of scalp -- no other organ involvment\par reports DM has been controlled but BP often high -- metoprolol increased several mos ago for this

## 2024-12-06 NOTE — DIALYSIS
Treatment summary  Received report from Patrick MESSER Rn    Receive patient in ED bed 14, A/o x3, VSS but  SBP high 200/110, p-70,  LUE AVF functioning well accessed without difficulty treatment initiated.    Dialyzed patient in ED  for 3.5 hours.     Patient con-compliant siting on the bedside with her legs on the floor,  When asked to put legs back in bed pt. Refused and said \" I do it all the   Time at the clinic and I can do what I want to do\" Policy regarding risk of   fall and accidental dislodgement of blod line Explained to patient but   patient refused. Dr. Martin aware.    Total Uf 3500  ml  Net UF 3000 ml     Treatment note:           Patient tolerate treatment well remain in stable condition.    Offered assistant during treatment but pt. Became verbally aggressive.    Vascular access visible at all times and line connected at all   times during treatment. Access LUE AVg Functioning well   De access without complications.       Report given to Patrick MESSER RN with all questions answered.

## 2024-12-06 NOTE — ED PROVIDER NOTES
Konstantin Valera MD @ at 1200 on 12/6/2024    Patient is being admitted to the hospital by Dr. Freeman. The results of their tests and reasons for their admission have been discussed with them and/or available family. They convey agreement and understanding for the need to be admitted and for their admission diagnosis.      CONDITIONS ON ADMISSION:  Sepsis is not present at the time of admission. Deep Vein Thrombosis is not present at the time of admission. Thrombosis is not present at the time of admission. Urinary Tract Infection is not present at the time of admission. Pneumonia is not present at the time of admission. MRSA is not present at the time of admission. Wound infection is not present at the time of admission. Pressure Ulcer is not present at the time of admission.        Is this patient to be included in the SEP-1 core measure? No Exclusion criteria - the patient is NOT to be included for SEP-1 Core Measure due to: Infection is not suspected    CLINICAL IMPRESSION:  1. Hypervolemia, unspecified hypervolemia type    2. ESRD (end stage renal disease) (HCC)    3. Nonischemic cardiomyopathy (HCC)    4. Elevated troponin        PLAN:  Admit     Dragon Disclaimer     Please note that this dictation was completed with Qoiza, the computer voice recognition software. Quite often unanticipated grammatical, syntax, homophones, and other interpretive errors are inadvertently transcribed by the computer software. Please disregard these errors. Please excuse any errors that have escaped final proofreading.      I Konstantin Valera MD am the primary clinician of record.  Konstantin Valera MD  (Electronically signed)            Konstantin Valera MD  12/07/24 8549

## 2024-12-06 NOTE — ED TRIAGE NOTES
Patient ambulatory to ED for reported missed dialysis. Patient eating [employer] breakfast in waiting room; reports she has been vomiting, having SOB and CP for \"a while\". Patient reports that she has not been to dialysis for 2 weeks due to transportation issues

## 2024-12-07 ENCOUNTER — APPOINTMENT (OUTPATIENT)
Facility: HOSPITAL | Age: 29
DRG: 291 | End: 2024-12-07
Attending: INTERNAL MEDICINE
Payer: MEDICARE

## 2024-12-07 PROBLEM — F31.9 BIPOLAR 1 DISORDER (HCC): Status: ACTIVE | Noted: 2024-12-07

## 2024-12-07 PROBLEM — D69.6 THROMBOCYTOPENIA (HCC): Status: ACTIVE | Noted: 2024-12-07

## 2024-12-07 PROBLEM — L30.9 ECZEMA: Status: ACTIVE | Noted: 2024-12-07

## 2024-12-07 PROBLEM — K66.8: Status: ACTIVE | Noted: 2024-12-07

## 2024-12-07 PROBLEM — I16.0 HYPERTENSIVE URGENCY: Status: ACTIVE | Noted: 2024-12-07

## 2024-12-07 PROBLEM — I42.8 NONISCHEMIC CARDIOMYOPATHY (HCC): Status: ACTIVE | Noted: 2024-12-07

## 2024-12-07 LAB
ALBUMIN SERPL-MCNC: 2.4 G/DL (ref 3.4–5)
ALBUMIN/GLOB SERPL: 0.5 (ref 0.8–1.7)
ALP SERPL-CCNC: 57 U/L (ref 45–117)
ALT SERPL-CCNC: 9 U/L (ref 13–56)
ANION GAP SERPL CALC-SCNC: 8 MMOL/L (ref 3–18)
AST SERPL-CCNC: 4 U/L (ref 10–38)
BASOPHILS # BLD: 0 K/UL (ref 0–0.1)
BASOPHILS NFR BLD: 0 % (ref 0–2)
BILIRUB SERPL-MCNC: 0.4 MG/DL (ref 0.2–1)
BUN SERPL-MCNC: 60 MG/DL (ref 7–18)
BUN/CREAT SERPL: 5 (ref 12–20)
CALCIUM SERPL-MCNC: 8.3 MG/DL (ref 8.5–10.1)
CHLORIDE SERPL-SCNC: 102 MMOL/L (ref 100–111)
CO2 SERPL-SCNC: 26 MMOL/L (ref 21–32)
CREAT SERPL-MCNC: 13.2 MG/DL (ref 0.6–1.3)
DIFFERENTIAL METHOD BLD: ABNORMAL
EOSINOPHIL # BLD: 0.2 K/UL (ref 0–0.4)
EOSINOPHIL NFR BLD: 3 % (ref 0–5)
ERYTHROCYTE [DISTWIDTH] IN BLOOD BY AUTOMATED COUNT: 19.2 % (ref 11.6–14.5)
GLOBULIN SER CALC-MCNC: 4.5 G/DL (ref 2–4)
GLUCOSE SERPL-MCNC: 105 MG/DL (ref 74–99)
HCT VFR BLD AUTO: 30.3 % (ref 35–45)
HGB BLD-MCNC: 10 G/DL (ref 12–16)
IMM GRANULOCYTES # BLD AUTO: 0 K/UL (ref 0–0.04)
IMM GRANULOCYTES NFR BLD AUTO: 0 % (ref 0–0.5)
LYMPHOCYTES # BLD: 0.8 K/UL (ref 0.9–3.6)
LYMPHOCYTES NFR BLD: 11 % (ref 21–52)
MCH RBC QN AUTO: 26.6 PG (ref 24–34)
MCHC RBC AUTO-ENTMCNC: 33 G/DL (ref 31–37)
MCV RBC AUTO: 80.6 FL (ref 78–100)
MONOCYTES # BLD: 0.8 K/UL (ref 0.05–1.2)
MONOCYTES NFR BLD: 12 % (ref 3–10)
NEUTS SEG # BLD: 5.1 K/UL (ref 1.8–8)
NEUTS SEG NFR BLD: 74 % (ref 40–73)
NRBC # BLD: 0 K/UL (ref 0–0.01)
NRBC BLD-RTO: 0 PER 100 WBC
NT PRO BNP: ABNORMAL PG/ML (ref 0–450)
PLATELET # BLD AUTO: 77 K/UL (ref 135–420)
POTASSIUM SERPL-SCNC: 4 MMOL/L (ref 3.5–5.5)
PROT SERPL-MCNC: 6.9 G/DL (ref 6.4–8.2)
RBC # BLD AUTO: 3.76 M/UL (ref 4.2–5.3)
SODIUM SERPL-SCNC: 136 MMOL/L (ref 136–145)
TROPONIN I SERPL HS-MCNC: 758 NG/L (ref 0–54)
WBC # BLD AUTO: 6.9 K/UL (ref 4.6–13.2)

## 2024-12-07 PROCEDURE — 85025 COMPLETE CBC W/AUTO DIFF WBC: CPT

## 2024-12-07 PROCEDURE — 6360000002 HC RX W HCPCS: Performed by: HOSPITALIST

## 2024-12-07 PROCEDURE — 6360000002 HC RX W HCPCS: Performed by: INTERNAL MEDICINE

## 2024-12-07 PROCEDURE — 90935 HEMODIALYSIS ONE EVALUATION: CPT

## 2024-12-07 PROCEDURE — 6370000000 HC RX 637 (ALT 250 FOR IP): Performed by: HOSPITALIST

## 2024-12-07 PROCEDURE — 6370000000 HC RX 637 (ALT 250 FOR IP): Performed by: INTERNAL MEDICINE

## 2024-12-07 PROCEDURE — 2580000003 HC RX 258: Performed by: INTERNAL MEDICINE

## 2024-12-07 PROCEDURE — 93306 TTE W/DOPPLER COMPLETE: CPT

## 2024-12-07 PROCEDURE — 83880 ASSAY OF NATRIURETIC PEPTIDE: CPT

## 2024-12-07 PROCEDURE — 6370000000 HC RX 637 (ALT 250 FOR IP): Performed by: STUDENT IN AN ORGANIZED HEALTH CARE EDUCATION/TRAINING PROGRAM

## 2024-12-07 PROCEDURE — 80053 COMPREHEN METABOLIC PANEL: CPT

## 2024-12-07 PROCEDURE — 94640 AIRWAY INHALATION TREATMENT: CPT

## 2024-12-07 PROCEDURE — 2580000003 HC RX 258: Performed by: HOSPITALIST

## 2024-12-07 PROCEDURE — 84484 ASSAY OF TROPONIN QUANT: CPT

## 2024-12-07 PROCEDURE — 2000000000 HC ICU R&B

## 2024-12-07 RX ORDER — OXYCODONE AND ACETAMINOPHEN 5; 325 MG/1; MG/1
1 TABLET ORAL EVERY 6 HOURS PRN
Status: DISCONTINUED | OUTPATIENT
Start: 2024-12-07 | End: 2024-12-09 | Stop reason: HOSPADM

## 2024-12-07 RX ORDER — ACETAMINOPHEN 325 MG/1
650 TABLET ORAL EVERY 4 HOURS PRN
Status: DISCONTINUED | OUTPATIENT
Start: 2024-12-07 | End: 2024-12-09 | Stop reason: HOSPADM

## 2024-12-07 RX ORDER — BUTALBITAL, ACETAMINOPHEN AND CAFFEINE 50; 325; 40 MG/1; MG/1; MG/1
1 TABLET ORAL EVERY 4 HOURS PRN
Status: DISCONTINUED | OUTPATIENT
Start: 2024-12-07 | End: 2024-12-09 | Stop reason: HOSPADM

## 2024-12-07 RX ORDER — BUMETANIDE 0.25 MG/ML
2 INJECTION, SOLUTION INTRAMUSCULAR; INTRAVENOUS 2 TIMES DAILY
Status: DISCONTINUED | OUTPATIENT
Start: 2024-12-07 | End: 2024-12-08

## 2024-12-07 RX ADMIN — BUTALBITAL, ACETAMINOPHEN AND CAFFEINE 1 TABLET: 325; 50; 40 TABLET ORAL at 10:47

## 2024-12-07 RX ADMIN — ACETAMINOPHEN 650 MG: 325 TABLET ORAL at 06:11

## 2024-12-07 RX ADMIN — BUMETANIDE 2 MG: 1 TABLET ORAL at 07:45

## 2024-12-07 RX ADMIN — SODIUM CHLORIDE 7.5 MG/HR: 9 INJECTION, SOLUTION INTRAVENOUS at 18:07

## 2024-12-07 RX ADMIN — HYDROXYCHLOROQUINE SULFATE 200 MG: 200 TABLET ORAL at 07:46

## 2024-12-07 RX ADMIN — BUSPIRONE HYDROCHLORIDE 5 MG: 5 TABLET ORAL at 07:45

## 2024-12-07 RX ADMIN — LEVOTHYROXINE SODIUM 100 MCG: 0.1 TABLET ORAL at 06:02

## 2024-12-07 RX ADMIN — BUSPIRONE HYDROCHLORIDE 5 MG: 5 TABLET ORAL at 20:31

## 2024-12-07 RX ADMIN — PANTOPRAZOLE SODIUM 40 MG: 40 TABLET, DELAYED RELEASE ORAL at 07:45

## 2024-12-07 RX ADMIN — HYDROMORPHONE HYDROCHLORIDE 0.25 MG: 1 INJECTION, SOLUTION INTRAMUSCULAR; INTRAVENOUS; SUBCUTANEOUS at 03:37

## 2024-12-07 RX ADMIN — BUTALBITAL, ACETAMINOPHEN AND CAFFEINE 1 TABLET: 325; 50; 40 TABLET ORAL at 23:53

## 2024-12-07 RX ADMIN — SACUBITRIL AND VALSARTAN 1 TABLET: 97; 103 TABLET, FILM COATED ORAL at 08:47

## 2024-12-07 RX ADMIN — BUMETANIDE 2 MG: 0.25 INJECTION INTRAMUSCULAR; INTRAVENOUS at 18:26

## 2024-12-07 RX ADMIN — PANTOPRAZOLE SODIUM 40 MG: 40 TABLET, DELAYED RELEASE ORAL at 20:31

## 2024-12-07 RX ADMIN — PREDNISONE 10 MG: 10 TABLET ORAL at 07:44

## 2024-12-07 RX ADMIN — SODIUM CHLORIDE 2.5 MG/HR: 9 INJECTION, SOLUTION INTRAVENOUS at 06:58

## 2024-12-07 RX ADMIN — MELATONIN TAB 3 MG 3 MG: 3 TAB at 20:31

## 2024-12-07 RX ADMIN — SODIUM CHLORIDE, PRESERVATIVE FREE 10 ML: 5 INJECTION INTRAVENOUS at 07:46

## 2024-12-07 RX ADMIN — SODIUM CHLORIDE, PRESERVATIVE FREE 10 ML: 5 INJECTION INTRAVENOUS at 20:33

## 2024-12-07 RX ADMIN — BUMETANIDE 2 MG: 0.25 INJECTION INTRAMUSCULAR; INTRAVENOUS at 11:25

## 2024-12-07 RX ADMIN — CALCIUM CARBONATE 1000 MG: 500 TABLET, CHEWABLE ORAL at 07:44

## 2024-12-07 RX ADMIN — DIVALPROEX SODIUM 500 MG: 500 TABLET, EXTENDED RELEASE ORAL at 07:45

## 2024-12-07 RX ADMIN — CLONIDINE HYDROCHLORIDE 0.1 MG: 0.1 TABLET ORAL at 07:46

## 2024-12-07 RX ADMIN — METOLAZONE 5 MG: 5 TABLET ORAL at 07:44

## 2024-12-07 RX ADMIN — SACUBITRIL AND VALSARTAN 1 TABLET: 97; 103 TABLET, FILM COATED ORAL at 20:42

## 2024-12-07 RX ADMIN — DIVALPROEX SODIUM 500 MG: 500 TABLET, EXTENDED RELEASE ORAL at 20:31

## 2024-12-07 RX ADMIN — BUTALBITAL, ACETAMINOPHEN AND CAFFEINE 1 TABLET: 325; 50; 40 TABLET ORAL at 14:52

## 2024-12-07 RX ADMIN — BUTALBITAL, ACETAMINOPHEN AND CAFFEINE 1 TABLET: 325; 50; 40 TABLET ORAL at 19:05

## 2024-12-07 RX ADMIN — CARVEDILOL 12.5 MG: 12.5 TABLET, FILM COATED ORAL at 08:49

## 2024-12-07 RX ADMIN — ARFORMOTEROL TARTRATE: 15 SOLUTION RESPIRATORY (INHALATION) at 18:22

## 2024-12-07 RX ADMIN — HYDROMORPHONE HYDROCHLORIDE 0.25 MG: 1 INJECTION, SOLUTION INTRAMUSCULAR; INTRAVENOUS; SUBCUTANEOUS at 08:11

## 2024-12-07 RX ADMIN — SODIUM CHLORIDE 2.5 MG/HR: 9 INJECTION, SOLUTION INTRAVENOUS at 00:22

## 2024-12-07 RX ADMIN — CLONIDINE HYDROCHLORIDE 0.1 MG: 0.1 TABLET ORAL at 20:31

## 2024-12-07 RX ADMIN — ESCITALOPRAM OXALATE 10 MG: 10 TABLET ORAL at 07:45

## 2024-12-07 RX ADMIN — CARVEDILOL 12.5 MG: 12.5 TABLET, FILM COATED ORAL at 20:30

## 2024-12-07 RX ADMIN — OXYCODONE HYDROCHLORIDE AND ACETAMINOPHEN 1 TABLET: 5; 325 TABLET ORAL at 20:31

## 2024-12-07 ASSESSMENT — PAIN DESCRIPTION - DESCRIPTORS
DESCRIPTORS: ACHING;THROBBING
DESCRIPTORS: ACHING
DESCRIPTORS: ACHING;THROBBING

## 2024-12-07 ASSESSMENT — PAIN SCALES - GENERAL
PAINLEVEL_OUTOF10: 6
PAINLEVEL_OUTOF10: 8
PAINLEVEL_OUTOF10: 0
PAINLEVEL_OUTOF10: 4
PAINLEVEL_OUTOF10: 4
PAINLEVEL_OUTOF10: 9
PAINLEVEL_OUTOF10: 9
PAINLEVEL_OUTOF10: 4
PAINLEVEL_OUTOF10: 9
PAINLEVEL_OUTOF10: 6
PAINLEVEL_OUTOF10: 8
PAINLEVEL_OUTOF10: 8
PAINLEVEL_OUTOF10: 9

## 2024-12-07 ASSESSMENT — PAIN DESCRIPTION - LOCATION
LOCATION: HEAD
LOCATION: HEAD
LOCATION: BACK;HEAD
LOCATION: HEAD

## 2024-12-07 ASSESSMENT — PAIN - FUNCTIONAL ASSESSMENT
PAIN_FUNCTIONAL_ASSESSMENT: ACTIVITIES ARE NOT PREVENTED
PAIN_FUNCTIONAL_ASSESSMENT: ACTIVITIES ARE NOT PREVENTED

## 2024-12-07 ASSESSMENT — PAIN DESCRIPTION - ORIENTATION
ORIENTATION: ANTERIOR
ORIENTATION: ANTERIOR

## 2024-12-07 NOTE — ED NOTES
Report called to the floor. Nurse to accompany pt upstairs. Made receiving nurse aware of patients blood pressure and attempts to lower it.

## 2024-12-07 NOTE — CARE COORDINATION
Patient expects to be discharged to: House   Follow Up Appointment: Best Day/Time  Monday AM   One/Two Story Residence One story   History of falls? 0   Services At/After Discharge   Transition of Care Consult (CM Consult) N/A   Services At/After Discharge None   [place] Resource Information Provided? No   Mode of Transport at Discharge Other (see comment)  (medicaid transport)   Confirm Follow Up Transport Family   Condition of Participation: Discharge Planning   The Plan for Transition of Care is related to the following treatment goals: home with dialysis follow up   The Patient and/or Patient Representative was provided with a Choice of Provider? Patient   The Patient and/Or Patient Representative agree with the Discharge Plan? Yes   Freedom of Choice list was provided with basic dialogue that supports the patient's individualized plan of care/goals, treatment preferences, and shares the quality data associated with the providers?  Yes

## 2024-12-07 NOTE — PLAN OF CARE
Problem: Pain  Goal: Verbalizes/displays adequate comfort level or baseline comfort level  Outcome: Progressing  Flowsheets (Taken 12/7/2024 0800)  Verbalizes/displays adequate comfort level or baseline comfort level:   Encourage patient to monitor pain and request assistance   Assess pain using appropriate pain scale   Implement non-pharmacological measures as appropriate and evaluate response   Consider cultural and social influences on pain and pain management   Administer analgesics based on type and severity of pain and evaluate response     Problem: Safety - Adult  Goal: Free from fall injury  12/7/2024 0943 by Nevin Riddle, RN  Outcome: Progressing  12/7/2024 0059 by Keiko Low, RN  Outcome: Progressing

## 2024-12-08 PROBLEM — J45.909 ASTHMA: Status: ACTIVE | Noted: 2024-12-08

## 2024-12-08 PROBLEM — R22.2 SUBCUTANEOUS NODULE OF ABDOMINAL WALL: Status: ACTIVE | Noted: 2024-12-08

## 2024-12-08 LAB
ALBUMIN SERPL-MCNC: 2.2 G/DL (ref 3.4–5)
ALBUMIN/GLOB SERPL: 0.6 (ref 0.8–1.7)
ALP SERPL-CCNC: 52 U/L (ref 45–117)
ALT SERPL-CCNC: 8 U/L (ref 13–56)
ANION GAP SERPL CALC-SCNC: 7 MMOL/L (ref 3–18)
AST SERPL-CCNC: 5 U/L (ref 10–38)
BASOPHILS # BLD: 0 K/UL (ref 0–0.1)
BASOPHILS NFR BLD: 0 % (ref 0–2)
BILIRUB SERPL-MCNC: 0.3 MG/DL (ref 0.2–1)
BUN SERPL-MCNC: 31 MG/DL (ref 7–18)
BUN/CREAT SERPL: 4 (ref 12–20)
CALCIUM SERPL-MCNC: 7.9 MG/DL (ref 8.5–10.1)
CHLORIDE SERPL-SCNC: 105 MMOL/L (ref 100–111)
CO2 SERPL-SCNC: 28 MMOL/L (ref 21–32)
CREAT SERPL-MCNC: 8.82 MG/DL (ref 0.6–1.3)
DIFFERENTIAL METHOD BLD: ABNORMAL
ECHO AO ROOT DIAM: 3.2 CM
ECHO AO ROOT INDEX: 1.79 CM/M2
ECHO AV AREA PEAK VELOCITY: 3.7 CM2
ECHO AV AREA VTI: 3.7 CM2
ECHO AV AREA/BSA PEAK VELOCITY: 2.1 CM2/M2
ECHO AV AREA/BSA VTI: 2.1 CM2/M2
ECHO AV MEAN GRADIENT: 6 MMHG
ECHO AV MEAN VELOCITY: 1.2 M/S
ECHO AV PEAK GRADIENT: 10 MMHG
ECHO AV PEAK VELOCITY: 1.6 M/S
ECHO AV VELOCITY RATIO: 0.94
ECHO AV VTI: 32.7 CM
ECHO BSA: 1.8 M2
ECHO LA DIAMETER INDEX: 2.46 CM/M2
ECHO LA DIAMETER: 4.4 CM
ECHO LA TO AORTIC ROOT RATIO: 1.38
ECHO LA VOL A-L A2C: 91 ML (ref 22–52)
ECHO LA VOL A-L A4C: 68 ML (ref 22–52)
ECHO LA VOL BP: 83 ML (ref 22–52)
ECHO LA VOL MOD A2C: 89 ML (ref 22–52)
ECHO LA VOL MOD A4C: 66 ML (ref 22–52)
ECHO LA VOL/BSA BIPLANE: 46 ML/M2 (ref 16–34)
ECHO LA VOLUME AREA LENGTH: 86 ML
ECHO LA VOLUME INDEX A-L A2C: 51 ML/M2 (ref 16–34)
ECHO LA VOLUME INDEX A-L A4C: 38 ML/M2 (ref 16–34)
ECHO LA VOLUME INDEX AREA LENGTH: 48 ML/M2 (ref 16–34)
ECHO LA VOLUME INDEX MOD A2C: 50 ML/M2 (ref 16–34)
ECHO LA VOLUME INDEX MOD A4C: 37 ML/M2 (ref 16–34)
ECHO LV E' LATERAL VELOCITY: 5.85 CM/S
ECHO LV E' SEPTAL VELOCITY: 7.65 CM/S
ECHO LV EDV A2C: 138 ML
ECHO LV EDV A4C: 156 ML
ECHO LV EDV BP: 153 ML (ref 56–104)
ECHO LV EDV INDEX A4C: 87 ML/M2
ECHO LV EDV INDEX BP: 85 ML/M2
ECHO LV EDV NDEX A2C: 77 ML/M2
ECHO LV EJECTION FRACTION A2C: 54 %
ECHO LV EJECTION FRACTION A2C: 62 %
ECHO LV EJECTION FRACTION A4C: 44 %
ECHO LV EJECTION FRACTION A4C: 63 %
ECHO LV EJECTION FRACTION BIPLANE: 61 % (ref 55–100)
ECHO LV ESV A2C: 63 ML
ECHO LV ESV A4C: 58 ML
ECHO LV ESV BP: 60 ML (ref 19–49)
ECHO LV ESV INDEX A2C: 35 ML/M2
ECHO LV ESV INDEX A4C: 32 ML/M2
ECHO LV ESV INDEX BP: 34 ML/M2
ECHO LV FRACTIONAL SHORTENING: 26 % (ref 28–44)
ECHO LV GLOBAL LONGITUDINAL STRAIN (GLS): -14.8 %
ECHO LV INTERNAL DIMENSION DIASTOLE INDEX: 2.96 CM/M2
ECHO LV INTERNAL DIMENSION DIASTOLIC: 5.3 CM (ref 3.9–5.3)
ECHO LV INTERNAL DIMENSION SYSTOLIC INDEX: 2.18 CM/M2
ECHO LV INTERNAL DIMENSION SYSTOLIC: 3.9 CM
ECHO LV IVSD: 1.4 CM (ref 0.6–0.9)
ECHO LV MASS 2D: 335.5 G (ref 67–162)
ECHO LV MASS INDEX 2D: 187.4 G/M2 (ref 43–95)
ECHO LV POSTERIOR WALL DIASTOLIC: 1.5 CM (ref 0.6–0.9)
ECHO LV RELATIVE WALL THICKNESS RATIO: 0.57
ECHO LVOT AREA: 3.8 CM2
ECHO LVOT AV VTI INDEX: 0.94
ECHO LVOT DIAM: 2.2 CM
ECHO LVOT MEAN GRADIENT: 6 MMHG
ECHO LVOT PEAK GRADIENT: 9 MMHG
ECHO LVOT PEAK VELOCITY: 1.5 M/S
ECHO LVOT STROKE VOLUME INDEX: 65.4 ML/M2
ECHO LVOT SV: 117 ML
ECHO LVOT VTI: 30.8 CM
ECHO MV A VELOCITY: 1.39 M/S
ECHO MV E DECELERATION TIME (DT): 189 MS
ECHO MV E VELOCITY: 1.34 M/S
ECHO MV E/A RATIO: 0.96
ECHO MV E/E' LATERAL: 22.91
ECHO MV E/E' RATIO (AVERAGED): 20.21
ECHO MV E/E' SEPTAL: 17.52
ECHO PULMONARY ARTERY END DIASTOLIC PRESSURE: 11 MMHG
ECHO PV REGURGITANT MAX VELOCITY: 1.7 M/S
ECHO RA END SYSTOLIC VOLUME APICAL 4 CHAMBER INDEX BSA: 17 ML/M2
ECHO RA VOLUME: 30 ML
ECHO RV FREE WALL PEAK S': 21.3 CM/S
ECHO RV INTERNAL DIMENSION: 3.2 CM
ECHO RV TAPSE: 3.5 CM (ref 1.7–?)
EOSINOPHIL # BLD: 0.2 K/UL (ref 0–0.4)
EOSINOPHIL NFR BLD: 3 % (ref 0–5)
ERYTHROCYTE [DISTWIDTH] IN BLOOD BY AUTOMATED COUNT: 19.1 % (ref 11.6–14.5)
GLOBULIN SER CALC-MCNC: 3.9 G/DL (ref 2–4)
GLUCOSE BLD STRIP.AUTO-MCNC: 124 MG/DL (ref 70–110)
GLUCOSE SERPL-MCNC: 114 MG/DL (ref 74–99)
HCG UR QL: NEGATIVE
HCT VFR BLD AUTO: 31.1 % (ref 35–45)
HGB BLD-MCNC: 10 G/DL (ref 12–16)
IMM GRANULOCYTES # BLD AUTO: 0 K/UL (ref 0–0.04)
IMM GRANULOCYTES NFR BLD AUTO: 0 % (ref 0–0.5)
LYMPHOCYTES # BLD: 1.1 K/UL (ref 0.9–3.6)
LYMPHOCYTES NFR BLD: 19 % (ref 21–52)
MCH RBC QN AUTO: 26.5 PG (ref 24–34)
MCHC RBC AUTO-ENTMCNC: 32.2 G/DL (ref 31–37)
MCV RBC AUTO: 82.5 FL (ref 78–100)
MONOCYTES # BLD: 0.7 K/UL (ref 0.05–1.2)
MONOCYTES NFR BLD: 12 % (ref 3–10)
NEUTS SEG # BLD: 4 K/UL (ref 1.8–8)
NEUTS SEG NFR BLD: 66 % (ref 40–73)
NRBC # BLD: 0 K/UL (ref 0–0.01)
NRBC BLD-RTO: 0 PER 100 WBC
PLATELET # BLD AUTO: 84 K/UL (ref 135–420)
POTASSIUM SERPL-SCNC: 3.7 MMOL/L (ref 3.5–5.5)
PROT SERPL-MCNC: 6.1 G/DL (ref 6.4–8.2)
RBC # BLD AUTO: 3.77 M/UL (ref 4.2–5.3)
SODIUM SERPL-SCNC: 140 MMOL/L (ref 136–145)
WBC # BLD AUTO: 6 K/UL (ref 4.6–13.2)

## 2024-12-08 PROCEDURE — 81025 URINE PREGNANCY TEST: CPT

## 2024-12-08 PROCEDURE — 2580000003 HC RX 258: Performed by: HOSPITALIST

## 2024-12-08 PROCEDURE — 85025 COMPLETE CBC W/AUTO DIFF WBC: CPT

## 2024-12-08 PROCEDURE — 93306 TTE W/DOPPLER COMPLETE: CPT | Performed by: INTERNAL MEDICINE

## 2024-12-08 PROCEDURE — 6370000000 HC RX 637 (ALT 250 FOR IP): Performed by: STUDENT IN AN ORGANIZED HEALTH CARE EDUCATION/TRAINING PROGRAM

## 2024-12-08 PROCEDURE — 6360000002 HC RX W HCPCS: Performed by: INTERNAL MEDICINE

## 2024-12-08 PROCEDURE — 6360000002 HC RX W HCPCS: Performed by: HOSPITALIST

## 2024-12-08 PROCEDURE — 80053 COMPREHEN METABOLIC PANEL: CPT

## 2024-12-08 PROCEDURE — 6370000000 HC RX 637 (ALT 250 FOR IP): Performed by: INTERNAL MEDICINE

## 2024-12-08 PROCEDURE — 82962 GLUCOSE BLOOD TEST: CPT

## 2024-12-08 PROCEDURE — 6370000000 HC RX 637 (ALT 250 FOR IP): Performed by: HOSPITALIST

## 2024-12-08 PROCEDURE — 93356 MYOCRD STRAIN IMG SPCKL TRCK: CPT | Performed by: INTERNAL MEDICINE

## 2024-12-08 PROCEDURE — 1100000000 HC RM PRIVATE

## 2024-12-08 PROCEDURE — 2580000003 HC RX 258: Performed by: INTERNAL MEDICINE

## 2024-12-08 PROCEDURE — 94640 AIRWAY INHALATION TREATMENT: CPT

## 2024-12-08 RX ORDER — CLONIDINE HYDROCHLORIDE 0.1 MG/1
0.2 TABLET ORAL 2 TIMES DAILY
Status: DISCONTINUED | OUTPATIENT
Start: 2024-12-08 | End: 2024-12-08

## 2024-12-08 RX ORDER — MELOXICAM 7.5 MG/1
15 TABLET ORAL DAILY
Status: DISCONTINUED | OUTPATIENT
Start: 2024-12-08 | End: 2024-12-09 | Stop reason: HOSPADM

## 2024-12-08 RX ORDER — LABETALOL 200 MG/1
600 TABLET, FILM COATED ORAL EVERY 12 HOURS SCHEDULED
Status: DISCONTINUED | OUTPATIENT
Start: 2024-12-08 | End: 2024-12-09 | Stop reason: HOSPADM

## 2024-12-08 RX ORDER — BUDESONIDE 0.25 MG/2ML
0.5 INHALANT ORAL
Status: DISCONTINUED | OUTPATIENT
Start: 2024-12-08 | End: 2024-12-09 | Stop reason: HOSPADM

## 2024-12-08 RX ORDER — IPRATROPIUM BROMIDE AND ALBUTEROL SULFATE 2.5; .5 MG/3ML; MG/3ML
1 SOLUTION RESPIRATORY (INHALATION)
Status: DISCONTINUED | OUTPATIENT
Start: 2024-12-08 | End: 2024-12-09 | Stop reason: HOSPADM

## 2024-12-08 RX ORDER — AMLODIPINE BESYLATE 5 MG/1
10 TABLET ORAL DAILY
Status: DISCONTINUED | OUTPATIENT
Start: 2024-12-08 | End: 2024-12-09 | Stop reason: HOSPADM

## 2024-12-08 RX ORDER — CLONIDINE HYDROCHLORIDE 0.1 MG/1
0.2 TABLET ORAL 3 TIMES DAILY
Status: DISCONTINUED | OUTPATIENT
Start: 2024-12-08 | End: 2024-12-09 | Stop reason: HOSPADM

## 2024-12-08 RX ORDER — CLONIDINE HYDROCHLORIDE 0.1 MG/1
0.1 TABLET ORAL ONCE
Status: DISCONTINUED | OUTPATIENT
Start: 2024-12-08 | End: 2024-12-09 | Stop reason: HOSPADM

## 2024-12-08 RX ADMIN — SACUBITRIL AND VALSARTAN 1 TABLET: 97; 103 TABLET, FILM COATED ORAL at 09:05

## 2024-12-08 RX ADMIN — SACUBITRIL AND VALSARTAN 1 TABLET: 97; 103 TABLET, FILM COATED ORAL at 22:19

## 2024-12-08 RX ADMIN — IPRATROPIUM BROMIDE AND ALBUTEROL SULFATE 1 DOSE: .5; 3 SOLUTION RESPIRATORY (INHALATION) at 11:55

## 2024-12-08 RX ADMIN — ESCITALOPRAM OXALATE 10 MG: 10 TABLET ORAL at 08:09

## 2024-12-08 RX ADMIN — OXYCODONE HYDROCHLORIDE AND ACETAMINOPHEN 1 TABLET: 5; 325 TABLET ORAL at 16:04

## 2024-12-08 RX ADMIN — CALCIUM CARBONATE 1000 MG: 500 TABLET, CHEWABLE ORAL at 20:58

## 2024-12-08 RX ADMIN — SODIUM CHLORIDE 5 MG/HR: 9 INJECTION, SOLUTION INTRAVENOUS at 00:54

## 2024-12-08 RX ADMIN — OXYCODONE HYDROCHLORIDE AND ACETAMINOPHEN 1 TABLET: 5; 325 TABLET ORAL at 22:09

## 2024-12-08 RX ADMIN — MELATONIN TAB 3 MG 3 MG: 3 TAB at 20:59

## 2024-12-08 RX ADMIN — PANTOPRAZOLE SODIUM 40 MG: 40 TABLET, DELAYED RELEASE ORAL at 08:09

## 2024-12-08 RX ADMIN — BUTALBITAL, ACETAMINOPHEN AND CAFFEINE 1 TABLET: 325; 50; 40 TABLET ORAL at 06:55

## 2024-12-08 RX ADMIN — OXYCODONE HYDROCHLORIDE AND ACETAMINOPHEN 1 TABLET: 5; 325 TABLET ORAL at 10:19

## 2024-12-08 RX ADMIN — CALCIUM CARBONATE 1000 MG: 500 TABLET, CHEWABLE ORAL at 08:09

## 2024-12-08 RX ADMIN — POLYETHYLENE GLYCOL 3350 17 G: 17 POWDER, FOR SOLUTION ORAL at 21:01

## 2024-12-08 RX ADMIN — PREDNISONE 10 MG: 10 TABLET ORAL at 08:09

## 2024-12-08 RX ADMIN — METOLAZONE 5 MG: 5 TABLET ORAL at 08:09

## 2024-12-08 RX ADMIN — CARVEDILOL 12.5 MG: 12.5 TABLET, FILM COATED ORAL at 08:09

## 2024-12-08 RX ADMIN — AMLODIPINE BESYLATE 10 MG: 5 TABLET ORAL at 16:04

## 2024-12-08 RX ADMIN — CLONIDINE HYDROCHLORIDE 0.1 MG: 0.1 TABLET ORAL at 08:09

## 2024-12-08 RX ADMIN — BUSPIRONE HYDROCHLORIDE 5 MG: 5 TABLET ORAL at 21:00

## 2024-12-08 RX ADMIN — CLONIDINE HYDROCHLORIDE 0.2 MG: 0.1 TABLET ORAL at 21:00

## 2024-12-08 RX ADMIN — HYDROXYCHLOROQUINE SULFATE 200 MG: 200 TABLET ORAL at 08:09

## 2024-12-08 RX ADMIN — DIVALPROEX SODIUM 500 MG: 500 TABLET, EXTENDED RELEASE ORAL at 08:08

## 2024-12-08 RX ADMIN — CLONIDINE HYDROCHLORIDE 0.2 MG: 0.1 TABLET ORAL at 16:04

## 2024-12-08 RX ADMIN — BUSPIRONE HYDROCHLORIDE 5 MG: 5 TABLET ORAL at 08:09

## 2024-12-08 RX ADMIN — LABETALOL HYDROCHLORIDE 600 MG: 200 TABLET, FILM COATED ORAL at 20:58

## 2024-12-08 RX ADMIN — SODIUM CHLORIDE, PRESERVATIVE FREE 10 ML: 5 INJECTION INTRAVENOUS at 09:05

## 2024-12-08 RX ADMIN — SODIUM CHLORIDE, PRESERVATIVE FREE 10 ML: 5 INJECTION INTRAVENOUS at 21:45

## 2024-12-08 RX ADMIN — MELOXICAM 15 MG: 7.5 TABLET ORAL at 16:04

## 2024-12-08 RX ADMIN — PANTOPRAZOLE SODIUM 40 MG: 40 TABLET, DELAYED RELEASE ORAL at 20:59

## 2024-12-08 RX ADMIN — DIVALPROEX SODIUM 500 MG: 500 TABLET, EXTENDED RELEASE ORAL at 20:59

## 2024-12-08 RX ADMIN — LEVOTHYROXINE SODIUM 100 MCG: 0.1 TABLET ORAL at 06:55

## 2024-12-08 RX ADMIN — LABETALOL HYDROCHLORIDE 10 MG: 5 INJECTION INTRAVENOUS at 12:16

## 2024-12-08 RX ADMIN — BUMETANIDE 2 MG: 0.25 INJECTION INTRAMUSCULAR; INTRAVENOUS at 08:08

## 2024-12-08 ASSESSMENT — PAIN DESCRIPTION - LOCATION
LOCATION: GENERALIZED
LOCATION: HEAD
LOCATION: BACK;HEAD
LOCATION: HEAD
LOCATION: GENERALIZED
LOCATION: BACK
LOCATION: BACK

## 2024-12-08 ASSESSMENT — PAIN - FUNCTIONAL ASSESSMENT
PAIN_FUNCTIONAL_ASSESSMENT: ACTIVITIES ARE NOT PREVENTED

## 2024-12-08 ASSESSMENT — PAIN SCALES - GENERAL
PAINLEVEL_OUTOF10: 6
PAINLEVEL_OUTOF10: 8
PAINLEVEL_OUTOF10: 10
PAINLEVEL_OUTOF10: 0
PAINLEVEL_OUTOF10: 8
PAINLEVEL_OUTOF10: 8
PAINLEVEL_OUTOF10: 4
PAINLEVEL_OUTOF10: 8
PAINLEVEL_OUTOF10: 8
PAINLEVEL_OUTOF10: 7

## 2024-12-08 ASSESSMENT — PAIN DESCRIPTION - ORIENTATION
ORIENTATION: LOWER;ANTERIOR
ORIENTATION: ANTERIOR
ORIENTATION: POSTERIOR;LOWER
ORIENTATION: LOWER
ORIENTATION: ANTERIOR

## 2024-12-08 ASSESSMENT — PAIN DESCRIPTION - DESCRIPTORS
DESCRIPTORS: ACHING;THROBBING
DESCRIPTORS: ACHING;DISCOMFORT
DESCRIPTORS: ACHING

## 2024-12-08 ASSESSMENT — PAIN DESCRIPTION - PAIN TYPE: TYPE: CHRONIC PAIN

## 2024-12-08 ASSESSMENT — PAIN SCALES - WONG BAKER: WONGBAKER_NUMERICALRESPONSE: NO HURT

## 2024-12-09 VITALS
WEIGHT: 151.01 LBS | TEMPERATURE: 97.5 F | RESPIRATION RATE: 18 BRPM | OXYGEN SATURATION: 98 % | SYSTOLIC BLOOD PRESSURE: 130 MMHG | BODY MASS INDEX: 24.27 KG/M2 | HEIGHT: 66 IN | HEART RATE: 70 BPM | DIASTOLIC BLOOD PRESSURE: 63 MMHG

## 2024-12-09 PROBLEM — R46.89 NON-COMPLIANT BEHAVIOR: Status: ACTIVE | Noted: 2024-12-09

## 2024-12-09 LAB
ALBUMIN SERPL-MCNC: 2.4 G/DL (ref 3.4–5)
ALBUMIN/GLOB SERPL: 0.6 (ref 0.8–1.7)
ALP SERPL-CCNC: 54 U/L (ref 45–117)
ALT SERPL-CCNC: 9 U/L (ref 13–56)
ANION GAP SERPL CALC-SCNC: 9 MMOL/L (ref 3–18)
AST SERPL-CCNC: <3 U/L (ref 10–38)
BASOPHILS # BLD: 0 K/UL (ref 0–0.1)
BASOPHILS NFR BLD: 0 % (ref 0–2)
BILIRUB SERPL-MCNC: 0.4 MG/DL (ref 0.2–1)
BUN SERPL-MCNC: 43 MG/DL (ref 7–18)
BUN/CREAT SERPL: 4 (ref 12–20)
CALCIUM SERPL-MCNC: 8.3 MG/DL (ref 8.5–10.1)
CHLORIDE SERPL-SCNC: 104 MMOL/L (ref 100–111)
CO2 SERPL-SCNC: 25 MMOL/L (ref 21–32)
CREAT SERPL-MCNC: 10.8 MG/DL (ref 0.6–1.3)
DIFFERENTIAL METHOD BLD: ABNORMAL
EOSINOPHIL # BLD: 0.1 K/UL (ref 0–0.4)
EOSINOPHIL NFR BLD: 1 % (ref 0–5)
ERYTHROCYTE [DISTWIDTH] IN BLOOD BY AUTOMATED COUNT: 19.2 % (ref 11.6–14.5)
GLOBULIN SER CALC-MCNC: 4.2 G/DL (ref 2–4)
GLUCOSE BLD STRIP.AUTO-MCNC: 104 MG/DL (ref 70–110)
GLUCOSE SERPL-MCNC: 103 MG/DL (ref 74–99)
HBV SURFACE AB SER QL IA: POSITIVE
HBV SURFACE AB SERPL IA-ACNC: 36.95 MIU/ML
HCT VFR BLD AUTO: 30.7 % (ref 35–45)
HEP BS AB COMMENT: NORMAL
HGB BLD-MCNC: 9.8 G/DL (ref 12–16)
IMM GRANULOCYTES # BLD AUTO: 0 K/UL (ref 0–0.04)
IMM GRANULOCYTES NFR BLD AUTO: 0 % (ref 0–0.5)
LYMPHOCYTES # BLD: 1.2 K/UL (ref 0.9–3.6)
LYMPHOCYTES NFR BLD: 20 % (ref 21–52)
MCH RBC QN AUTO: 26.8 PG (ref 24–34)
MCHC RBC AUTO-ENTMCNC: 31.9 G/DL (ref 31–37)
MCV RBC AUTO: 83.9 FL (ref 78–100)
MONOCYTES # BLD: 0.4 K/UL (ref 0.05–1.2)
MONOCYTES NFR BLD: 7 % (ref 3–10)
NEUTS SEG # BLD: 4.1 K/UL (ref 1.8–8)
NEUTS SEG NFR BLD: 71 % (ref 40–73)
NRBC # BLD: 0 K/UL (ref 0–0.01)
NRBC BLD-RTO: 0 PER 100 WBC
PLATELET # BLD AUTO: 92 K/UL (ref 135–420)
POTASSIUM SERPL-SCNC: 3.7 MMOL/L (ref 3.5–5.5)
PROT SERPL-MCNC: 6.6 G/DL (ref 6.4–8.2)
RBC # BLD AUTO: 3.66 M/UL (ref 4.2–5.3)
SODIUM SERPL-SCNC: 138 MMOL/L (ref 136–145)
WBC # BLD AUTO: 5.7 K/UL (ref 4.6–13.2)

## 2024-12-09 PROCEDURE — 36415 COLL VENOUS BLD VENIPUNCTURE: CPT

## 2024-12-09 PROCEDURE — 6370000000 HC RX 637 (ALT 250 FOR IP): Performed by: INTERNAL MEDICINE

## 2024-12-09 PROCEDURE — 6370000000 HC RX 637 (ALT 250 FOR IP): Performed by: HOSPITALIST

## 2024-12-09 PROCEDURE — 6370000000 HC RX 637 (ALT 250 FOR IP): Performed by: STUDENT IN AN ORGANIZED HEALTH CARE EDUCATION/TRAINING PROGRAM

## 2024-12-09 PROCEDURE — 85025 COMPLETE CBC W/AUTO DIFF WBC: CPT

## 2024-12-09 PROCEDURE — 80053 COMPREHEN METABOLIC PANEL: CPT

## 2024-12-09 PROCEDURE — 82962 GLUCOSE BLOOD TEST: CPT

## 2024-12-09 PROCEDURE — 2580000003 HC RX 258: Performed by: HOSPITALIST

## 2024-12-09 PROCEDURE — 90935 HEMODIALYSIS ONE EVALUATION: CPT

## 2024-12-09 RX ORDER — AMLODIPINE BESYLATE 10 MG/1
10 TABLET ORAL DAILY
Qty: 30 TABLET | Refills: 3 | Status: SHIPPED | OUTPATIENT
Start: 2024-12-10

## 2024-12-09 RX ORDER — CLONIDINE HYDROCHLORIDE 0.2 MG/1
0.2 TABLET ORAL 2 TIMES DAILY
Qty: 60 TABLET | Refills: 3 | Status: SHIPPED | OUTPATIENT
Start: 2024-12-09

## 2024-12-09 RX ORDER — LABETALOL 300 MG/1
600 TABLET, FILM COATED ORAL EVERY 12 HOURS SCHEDULED
Qty: 60 TABLET | Refills: 3 | Status: SHIPPED | OUTPATIENT
Start: 2024-12-09

## 2024-12-09 RX ADMIN — PANTOPRAZOLE SODIUM 40 MG: 40 TABLET, DELAYED RELEASE ORAL at 08:53

## 2024-12-09 RX ADMIN — LABETALOL HYDROCHLORIDE 600 MG: 200 TABLET, FILM COATED ORAL at 08:53

## 2024-12-09 RX ADMIN — LEVOTHYROXINE SODIUM 100 MCG: 0.1 TABLET ORAL at 08:57

## 2024-12-09 RX ADMIN — SACUBITRIL AND VALSARTAN 1 TABLET: 97; 103 TABLET, FILM COATED ORAL at 10:37

## 2024-12-09 RX ADMIN — MELOXICAM 15 MG: 7.5 TABLET ORAL at 08:54

## 2024-12-09 RX ADMIN — CALCIUM CARBONATE 1000 MG: 500 TABLET, CHEWABLE ORAL at 08:54

## 2024-12-09 RX ADMIN — DIVALPROEX SODIUM 500 MG: 500 TABLET, EXTENDED RELEASE ORAL at 08:53

## 2024-12-09 RX ADMIN — CLONIDINE HYDROCHLORIDE 0.2 MG: 0.1 TABLET ORAL at 08:53

## 2024-12-09 RX ADMIN — HYDROXYCHLOROQUINE SULFATE 200 MG: 200 TABLET ORAL at 08:53

## 2024-12-09 RX ADMIN — OXYCODONE HYDROCHLORIDE AND ACETAMINOPHEN 1 TABLET: 5; 325 TABLET ORAL at 04:16

## 2024-12-09 RX ADMIN — OXYCODONE HYDROCHLORIDE AND ACETAMINOPHEN 1 TABLET: 5; 325 TABLET ORAL at 10:31

## 2024-12-09 RX ADMIN — PREDNISONE 10 MG: 10 TABLET ORAL at 08:53

## 2024-12-09 RX ADMIN — AMLODIPINE BESYLATE 10 MG: 5 TABLET ORAL at 08:53

## 2024-12-09 RX ADMIN — OXYCODONE HYDROCHLORIDE AND ACETAMINOPHEN 1 TABLET: 5; 325 TABLET ORAL at 17:10

## 2024-12-09 RX ADMIN — BUSPIRONE HYDROCHLORIDE 5 MG: 5 TABLET ORAL at 08:53

## 2024-12-09 RX ADMIN — ESCITALOPRAM OXALATE 10 MG: 10 TABLET ORAL at 08:53

## 2024-12-09 RX ADMIN — SODIUM CHLORIDE, PRESERVATIVE FREE 10 ML: 5 INJECTION INTRAVENOUS at 08:55

## 2024-12-09 ASSESSMENT — PAIN SCALES - GENERAL
PAINLEVEL_OUTOF10: 8
PAINLEVEL_OUTOF10: 8
PAINLEVEL_OUTOF10: 5
PAINLEVEL_OUTOF10: 0
PAINLEVEL_OUTOF10: 5
PAINLEVEL_OUTOF10: 0

## 2024-12-09 ASSESSMENT — PAIN DESCRIPTION - PAIN TYPE: TYPE: CHRONIC PAIN

## 2024-12-09 ASSESSMENT — PAIN DESCRIPTION - LOCATION
LOCATION: BACK
LOCATION: GENERALIZED;BACK
LOCATION: BACK

## 2024-12-09 ASSESSMENT — PAIN DESCRIPTION - ORIENTATION: ORIENTATION: LOWER

## 2024-12-09 ASSESSMENT — PAIN SCALES - WONG BAKER
WONGBAKER_NUMERICALRESPONSE: NO HURT
WONGBAKER_NUMERICALRESPONSE: NO HURT

## 2024-12-09 ASSESSMENT — PAIN DESCRIPTION - DESCRIPTORS
DESCRIPTORS: ACHING
DESCRIPTORS: ACHING;SORE

## 2024-12-09 ASSESSMENT — PAIN DESCRIPTION - ONSET: ONSET: ON-GOING

## 2024-12-09 ASSESSMENT — PAIN - FUNCTIONAL ASSESSMENT: PAIN_FUNCTIONAL_ASSESSMENT: ACTIVITIES ARE NOT PREVENTED

## 2024-12-09 ASSESSMENT — PAIN DESCRIPTION - FREQUENCY: FREQUENCY: CONTINUOUS

## 2024-12-09 NOTE — DISCHARGE SUMMARY
Discharge Summary    Patient: Sandrita Park               Sex: female          DOA: 12/6/2024         YOB: 1995      Age:  29 y.o.        LOS:  LOS: 3 days                Admit Date: 12/6/2024    Discharge Date: 12/9/2024    Admission Diagnoses: ESRD (end stage renal disease) (HCC) [N18.6]  Fluid overload [E87.70]  Hypervolemia, unspecified hypervolemia type [E87.70]    Discharge Diagnoses:    Hospital Problems             Last Modified POA    * (Principal) Acute metabolic encephalopathy 12/6/2024 Yes    ESRD on dialysis (HCC) 12/6/2024 Yes    Non-compliant behavior 12/9/2024 Yes    Anemia in chronic kidney disease (CKD) 12/6/2024 Yes    Systemic lupus erythematosus with glomerular disease (HCC) 12/7/2024 Yes    GERD (gastroesophageal reflux disease) 12/6/2024 Yes    Hypertension 12/6/2024 Yes    Hypothyroidism 12/7/2024 Yes    Overview Signed 3/24/2022  4:15 PM by Oriana Fitzpatrick MD     hypo           Lupus nephritis (HCC) 12/6/2024 Yes    Fluid overload 12/6/2024 Yes    Bipolar 1 disorder ([facility]) 12/7/2024 Yes    Hypertensive urgency 12/7/2024 Yes    Nonischemic cardiomyopathy ([facility]) 12/7/2024 Yes    Cyst of peritoneal cavity 12/7/2024 Yes    Eczema 12/7/2024 Yes    Thrombocytopenia ([facility]) 12/7/2024 Yes    Asthma 12/8/2024 Yes    Subcutaneous nodule of abdominal wall 12/8/2024 Yes        Discharge Condition: Good    Hospital Course ;  29 y.o.  female with history of end-stage renal disease secondary to lupus nephritis on dialysis 3 times a week, patient is totally noncompliant with dialysis that she missed all her dialysis session for the last few weeks, presented today with weakness, fatigue, nausea and vomiting.  History was difficult to take with the patient since she is sleepy and confused to some degree.  She is able to answer questions appropriately if I repeated the questions multiple times.  She does not seem to be in any distress other than sleepy and weak.    Patient was put on Cardene drip for

## 2024-12-09 NOTE — CONSULTS
Encounter Date: 12/06/24   EKG 12 Lead   Result Value    Ventricular Rate 75    Atrial Rate 75    P-R Interval 166    QRS Duration 90    Q-T Interval 436    QTc Calculation (Bazett) 486    P Axis 66    R Axis 67    T Axis 46    Diagnosis      Normal sinus rhythm  Possible Left atrial enlargement  Anteroseptal infarct , age undetermined  Abnormal ECG  Confirmed by Kelvin Carrasquillo MD ([id number]) on 12/6/2024 5:35:26 PM       12/06/24    ECHO (TTE) COMPLETE (PRN CONTRAST/BUBBLE/STRAIN/3D) 12/08/2024  5:00 PM (Final)    Interpretation Summary    Image quality is good.    Left Ventricle: Normal left ventricular systolic function with a visually estimated EF of 55 - 60%. Left ventricle size is normal. Increased wall thickness. Findings consistent with moderate concentric hypertrophy. Normal wall motion. Indeterminate diastolic function due to mitral valve disease. Global longitudinal strain is reduced with a value of -14.8%.    Left Atrium: Left atrium is dilated. Left atrial volume index is mildly increased (35-41 mL/m2).    Left Ventricle: Normal left ventricular systolic function with a visually estimated EF of 50 - 55%. Left ventricle size is normal. Increased wall thickness. Findings consistent with moderate concentric hypertrophy. Global longitudinal strain is reduced with a value of -14.8%.    Mitral Valve: Severe annular calcification at the posterior leaflet.  No stenosis.    Tricuspid Valve :  Unable to assess RVSP due to inadequate or insignificant tricuspid regurgitation.    Pericardium: Trivial circumferential pericardial effusion present. No indication of cardiac tamponade.    Signed by: Liv Reddy MD on 12/8/2024  5:00 PM    No results found for this or any previous visit.    No results found for this or any previous visit.    Xray Result (most recent):  XR CHEST LIMITED ONE VIEW 12/06/2024    Narrative  Clinical history: Shortness of breath  INDICATION:   Shortness of breath  COMPARISON: 
 3 mg Oral Nightly Lydia Freeman MD        labetalol (NORMODYNE;TRANDATE) injection 10 mg  10 mg IntraVENous Q2H PRN Lydia Freeman MD         Current Outpatient Medications   Medication Sig Dispense Refill    cloNIDine (CATAPRES) 0.1 MG tablet Take 1 tablet by mouth 2 times daily      ENTRESTO  MG per tablet Take 1 tablet by mouth 2 times daily      spironolactone (ALDACTONE) 25 MG tablet Take 1 tablet by mouth daily      predniSONE (DELTASONE) 10 MG tablet Take 1 tablet by mouth daily      albuterol sulfate HFA (PROVENTIL;VENTOLIN;PROAIR) 108 (90 Base) MCG/ACT inhaler Inhale into the lungs as needed      amLODIPine (NORVASC) 10 MG tablet Take 10 mg by mouth (Patient not taking: Reported on 6/15/2024)      budesonide (PULMICORT) 0.25 MG/2ML nebulizer suspension Inhale into the lungs      bumetanide (BUMEX) 2 MG tablet Take 1 tablet by mouth daily      busPIRone (BUSPAR) 10 MG tablet Take 1 tablet by mouth 3 times daily as needed      calcium carbonate (OYSTER SHELL CALCIUM 500 MG) 1250 (500 Ca) MG tablet Take 3 tablets by mouth 4 times daily (before meals and nightly)      cetirizine (ZYRTEC) 10 MG tablet Take by mouth      divalproex (DEPAKOTE ER) 500 MG extended release tablet Take 1 tablet by mouth 2 times daily      escitalopram (LEXAPRO) 10 MG tablet Take 1 tablet by mouth daily      famotidine (PEPCID) 20 MG tablet Take 20 mg by mouth (Patient not taking: Reported on 6/15/2024)      Ferrous Sulfate 324 MG TBEC Take by mouth 2 times daily (with meals)      fluticasone-salmeterol (ADVAIR DISKUS) 250-50 MCG/ACT AEPB diskus inhaler Inhale 1 puff into the lungs in the morning and 1 puff in the evening.      hydroxychloroquine (PLAQUENIL) 200 MG tablet Take 1 tablet by mouth      labetalol (NORMODYNE) 200 MG tablet Take 200 mg by mouth 3 times daily (Patient not taking: Reported on 6/15/2024)      levothyroxine (SYNTHROID) 100 MCG tablet Take 1 tablet by mouth every morning (before breakfast)      
0611    ondansetron (ZOFRAN) injection 4 mg  4 mg IntraVENous Q4H PRN Lydia Freeman MD        albuterol sulfate HFA (PROVENTIL;VENTOLIN;PROAIR) 108 (90 Base) MCG/ACT inhaler 1 puff  1 puff Inhalation Q6H PRN Lydia Freeman MD        budesonide (PULMICORT) nebulizer suspension 250 mcg  0.25 mg Nebulization BID RT Lydia Freeman MD        busPIRone (BUSPAR) tablet 5 mg  5 mg Oral BID Lydia Freeman MD   5 mg at 12/07/24 0745    cloNIDine (CATAPRES) tablet 0.1 mg  0.1 mg Oral BID Lydia Freeman MD   0.1 mg at 12/07/24 0746    divalproex (DEPAKOTE ER) extended release tablet 500 mg  500 mg Oral BID Lydia Freeman MD   500 mg at 12/07/24 0745    sacubitril-valsartan (ENTRESTO)  MG per tablet 1 tablet  1 tablet Oral BID Lydia Freeman MD        escitalopram (LEXAPRO) tablet 10 mg  10 mg Oral Daily Lydia Freeman MD   10 mg at 12/07/24 0745    arformoterol 15 mcg-budesonide 0.25 mg neb solution   Nebulization BID RT Lydia Freeman MD        hydroxychloroquine (PLAQUENIL) tablet 200 mg  200 mg Oral Daily Lydia Freeman MD   200 mg at 12/07/24 0746    levothyroxine (SYNTHROID) tablet 100 mcg  100 mcg Oral QAM AC Lydia Freeman MD   100 mcg at 12/07/24 0602    pantoprazole (PROTONIX) tablet 40 mg  40 mg Oral BID Lydia Freeman MD   40 mg at 12/07/24 0745    predniSONE (DELTASONE) tablet 10 mg  10 mg Oral Daily Lydia Freeman MD   10 mg at 12/07/24 0744    sodium chloride flush 0.9 % injection 5-40 mL  5-40 mL IntraVENous 2 times per day Lydia Freeman MD   10 mL at 12/07/24 0746    heparin (porcine) injection 5,000 Units  5,000 Units SubCUTAneous q8h Lydia Freeman MD   5,000 Units at 12/07/24 0744    ondansetron (ZOFRAN-ODT) disintegrating tablet 4 mg  4 mg Oral Q8H PRN Lydia Freeman MD        Or    ondansetron (ZOFRAN) injection 4 mg  4 mg IntraVENous Q6H PRN Lydia Freeman MD        polyethylene glycol (GLYCOLAX) packet 17 g  17 g Oral Daily PRN Lydia Freeman MD        acetaminophen (TYLENOL)

## 2024-12-09 NOTE — PLAN OF CARE
Problem: Discharge Planning  Goal: Discharge to home or other facility with appropriate resources  12/8/2024 2148 by Leann Blevins RN  Outcome: Progressing  Flowsheets  Taken 12/8/2024 2000 by Leann Blevins RN  Discharge to home or other facility with appropriate resources:   Identify barriers to discharge with patient and caregiver   Arrange for needed discharge resources and transportation as appropriate   Identify discharge learning needs (meds, wound care, etc)   Arrange for interpreters to assist at discharge as needed   Refer to discharge planning if patient needs post-hospital services based on physician order or complex needs related to functional status, cognitive ability or social support system  Taken 12/8/2024 0800 by Nevin Riddle RN  Discharge to home or other facility with appropriate resources:   Identify barriers to discharge with patient and caregiver   Identify discharge learning needs (meds, wound care, etc)   Arrange for needed discharge resources and transportation as appropriate   Arrange for interpreters to assist at discharge as needed     Problem: ABCDS Injury Assessment  Goal: Absence of physical injury  12/8/2024 2148 by Leann Blevins RN  Outcome: Progressing     Problem: Safety - Adult  Goal: Free from fall injury  12/9/2024 1016 by Casi Gonzalez RN  Outcome: Progressing  12/8/2024 2148 by Leann Blevins RN  Outcome: Progressing     Problem: Pain  Goal: Verbalizes/displays adequate comfort level or baseline comfort level  12/9/2024 1016 by Casi Gonzalez RN  Outcome: Progressing  Flowsheets  Taken 12/9/2024 0400 by Leann Blevins RN  Verbalizes/displays adequate comfort level or baseline comfort level:   Encourage patient to monitor pain and request assistance   Assess pain using appropriate pain scale   Administer analgesics based on type and severity of pain and evaluate response   Implement non-pharmacological measures as appropriate and evaluate response

## 2024-12-09 NOTE — PROGRESS NOTES
[facility]  Pulmonary, Critical Care, and Sleep Medicine    Name: Sandrita Park MRN: [id number]   : 1995 Hospital: [facility]    Date: 2024  Room: [address] [place] [address] Note                                                                             Consult requesting physician: Dr. APURVA Muhammad  Reason for Consult: End-stage renal disease on hemodialysis, hypertension urgency      IMPRESSION:     Hypertension urgency  End-stage renal disease on hemodialysis  Bipolar disorder  Anemia of chronic disease  Nonischemic cardiomyopathy  Thrombocytopenia  Asthma  History of peritoneal cysts, status post abdominal surgery  Poor compliance      Active Hospital Problems    Diagnosis Date Noted    Asthma [J45.909] 2024    Subcutaneous nodule of abdominal wall [R22.2] 2024    Bipolar 1 disorder (HCC) [F31.9] 2024    Hypertensive urgency [I16.0] 2024    Nonischemic cardiomyopathy (HCC) [I42.8] 2024    Cyst of peritoneal cavity [K66.8] 2024    Eczema [L30.9] 2024    Thrombocytopenia (HCC) [D69.6] 2024    Fluid overload [E87.70] 2024    Poor compliance [Z91.199] 2024    Acute metabolic encephalopathy [G93.41] 2024    Lupus nephritis (HCC) [M32.14] 2022    Anemia in chronic kidney disease (CKD) [N18.9, D63.1] 2022    ESRD on dialysis (HCC) [N18.6, Z99.2]     GERD (gastroesophageal reflux disease) [K21.9]     Hypertension [I10]     Systemic lupus erythematosus with glomerular disease (HCC) [M32.14]     Hypothyroidism [E03.9]          Code status: Full Code       RECOMMENDATIONS:     Respiratory: Stable respirations; on room air.  History of asthma, patient wheezing-budesonide increased to 0.5 mg twice daily; arformoterol nebs twice daily; ipratropium albuterol nebs 4 times daily.  Prn albuterol inhaler.  Chest x-ray on admission, reviewed and compared to prior-cardiomegaly, no significant findings for 
      Cardiology consultation was requested for depressed LV function    Date of  Admission: 12/6/2024 11:36 AM   Primary Care Physician:  Jj Mo MD     Assessment:   Severe mental issues  Agitated.   ESRD and on HD    Patient Active Problem List    Diagnosis Date Noted    Non-compliant behavior 12/09/2024    Asthma 12/08/2024    Subcutaneous nodule of abdominal wall 12/08/2024    Bipolar 1 disorder (HCC) 12/07/2024    Hypertensive urgency 12/07/2024    Nonischemic cardiomyopathy (HCC) 12/07/2024    Cyst of peritoneal cavity 12/07/2024    Eczema 12/07/2024    Thrombocytopenia (HCC) 12/07/2024    Fluid overload 12/06/2024    Poor compliance 12/06/2024    Acute metabolic encephalopathy 12/06/2024    Anemia in chronic kidney disease (CKD) 04/19/2022    Peritoneal hematoma 04/19/2022    Peritoneal dialysis catheter dysfunction (HCC) 04/19/2022    Acute bleeding 04/19/2022    Uncontrolled hypertension 04/19/2022    Lupus nephritis (HCC) 04/19/2022    Hemoperitoneum 04/19/2022    Abnormal pelvic ultrasound 04/19/2022    Psychiatric disorder     Encephalopathy 03/22/2022    Systemic lupus erythematosus with glomerular disease (HCC)     ESRD on dialysis (HCC)     GERD (gastroesophageal reflux disease)     Hypertension     Hypothyroidism         Plan:   CPM  Will sign off as she does not want further care from cardiology point of view.  EF normal.        History of Present Illness:   Sandrita Park is a 29 y.o.  female with history of end-stage renal disease secondary to lupus nephritis on dialysis 3 times a week, patient is totally noncompliant with dialysis that she missed all her dialysis session for the last few weeks, presented today with weakness, fatigue, nausea and vomiting. History was difficult  and she does not want to answer.        Data Review:     Recent Labs     12/07/24  0407 12/08/24  0518 12/09/24  0402   WBC 6.9 6.0 5.7   HGB 10.0* 10.0* 9.8*   HCT 30.3* 31.1* 30.7*   PLT 77* 84* 92*     Recent 
    Hospitalist Progress Note    Patient: Sandrita Park MRN: [id number]  CSN: [id number]    YOB: 1995  Age: 29 y.o.  Sex: female    DOA: 12/6/2024 LOS:  LOS: 2 days         Total duration of encounter: 2 days      Chief Complaint ;29 y.o.  female with history of end-stage renal disease secondary to lupus nephritis on dialysis 3 times a week, patient is totally noncompliant with dialysis that she missed all her dialysis session for the last few weeks, presented today with weakness, fatigue, nausea and vomiting.  History was difficult to take with the patient since she is sleepy and confused to some degree.  She is able to answer questions appropriately if I repeated the questions multiple times.  She does not seem to be in any distress other than sleepy and weak.      Subjective ; fully awake and alert today. Answering questions appropriately.       Review of systems:  Constitutional: denies fevers, chills, myalgias  Respiratory: denies SOB, cough  Cardiovascular: denies chest pain, palpitations  Gastrointestinal: denies nausea, vomiting, diarrhea    10 systems reviewed, all negative other than what is mentioned above.      Vital signs/Intake and Output:  Visit Vitals  BP (!) 173/104   Pulse 71   Temp 98.2 °F (36.8 °C) (Oral)   Resp 16   Ht 1.676 m (5' 6\")   Wt 68.5 kg (151 lb 0.2 oz)   SpO2 100%   BMI 24.37 kg/m²     Current Shift:  12/08 0701 - 12/08 1900  In: 0   Out: 150 [Urine:150]  Last three shifts:  12/06 1901 - 12/08 0700  In: 4321.8 [P.O.:332; I.V.:2989.8]  Out: 7600 [Urine:600]    Exam:    General: Well developed, alert, NAD, OX3  Head/Neck: NCAT, supple, No masses, No lymphadenopathy  CVS:Regular rate and rhythm, no M/R/G, S1/S2 heard, no thrill  Lungs:Clear to auscultation bilaterally, no wheezes, rhonchi, or rales  Abdomen: Soft, Nontender, No distention, Normal Bowel sounds, No hepatomegaly  Extremities: No C/C/E, pulses palpable 2+  Skin:normal texture and turgor, no rashes, no 
    Hospitalist Progress Note    Patient: Sandrita Park MRN: [id number]  CSN: [id number]    YOB: 1995  Age: 29 y.o.  Sex: female    DOA: 12/6/2024 LOS:  LOS: 1 day         Total duration of encounter: 1 day      Chief Complaint ;29 y.o.  female with history of end-stage renal disease secondary to lupus nephritis on dialysis 3 times a week, patient is totally noncompliant with dialysis that she missed all her dialysis session for the last few weeks, presented today with weakness, fatigue, nausea and vomiting.  History was difficult to take with the patient since she is sleepy and confused to some degree.  She is able to answer questions appropriately if I repeated the questions multiple times.  She does not seem to be in any distress other than sleepy and weak.      Subjective ; fully awake and alert today.  She had 1 session of dialysis yesterday, blood pressure was uncontrolled overnight.      Review of systems:  Constitutional: denies fevers, chills, myalgias  Respiratory: denies SOB, cough  Cardiovascular: denies chest pain, palpitations  Gastrointestinal: denies nausea, vomiting, diarrhea    10 systems reviewed, all negative other than what is mentioned above.      Vital signs/Intake and Output:  Visit Vitals  BP (!) 171/99   Pulse 78   Temp 98.2 °F (36.8 °C) (Oral)   Resp 16   Ht 1.676 m (5' 6\")   Wt 70.3 kg (154 lb 15.7 oz)   SpO2 97%   BMI 25.01 kg/m²     Current Shift:  12/07 0701 - 12/07 1900  In: 109.3 [I.V.:109.3]  Out: -   Last three shifts:  12/05 1901 - 12/07 0700  In: 2701.1 [I.V.:2201.1]  Out: 3500     Exam:    General: Well developed, alert, NAD, OX3  Head/Neck: NCAT, supple, No masses, No lymphadenopathy  CVS:Regular rate and rhythm, no M/R/G, S1/S2 heard, no thrill  Lungs:Clear to auscultation bilaterally, no wheezes, rhonchi, or rales  Abdomen: Soft, Nontender, No distention, Normal Bowel sounds, No hepatomegaly  Extremities: No C/C/E, pulses palpable 2+  Skin:normal texture and 
Assessment:     Sandrita Park is a 29 y.o. year old female  with SLE , lupus nephritis leading to ESRD now on HD , Hypertension, Anemia of CKD , secondary Hyperparathyroidism   CHF presented with shortness of breath and swelling .  She had missed almost 2 weeks of dialysis . She has been non compliant with dietary restriction, medicine and dialysis prescription for very long time despite explaining risk of decrease survival/sudden cardiac death multiple times. Does not take any phosphate binders despite very high level as outpatient .   Has hypertensive urgency with significant volume overload      ESRD  Volume overload/CHF /pulmonary edema   Hypertensive urgency   Anemia of CKD   Hyperphosphatemia          Plan:     Keep Bumex 2 mg BID  HD today  Continue with rest of BP meds.   No data that Enteresto is effective in ESRD pt    CC: ESRD, SLE  Interval History: Still hypertensive, feels tired, had HD no symptoms    Subjective:   See above        Review of Systems  Negative for  SOB,  Nausea, vomiting        Blood pressure (!) 171/99, pulse 78, temperature 98.2 °F (36.8 °C), temperature source Oral, resp. rate 16, height 1.676 m (5' 6\"), weight 70.3 kg (154 lb 15.7 oz), SpO2 97%.    Intake/Output Summary (Last 24 hours) at 12/7/2024 1023  Last data filed at 12/7/2024 0800  Gross per 24 hour   Intake 2810.45 ml   Output 3500 ml   Net -689.55 ml        NAD, Conversant  Respiratory: good effort, no rales, good breath sounds, no wheezings  Skin: no rash  Neruoligcal: no focal dificit, edema plus 2  Psychicatric: good judgment and insights, alert and oreinted        Intake/Output Summary (Last 24 hours) at 12/7/2024 1023  Last data filed at 12/7/2024 0800  Gross per 24 hour   Intake 2810.45 ml   Output 3500 ml   Net -689.55 ml      Recent Labs     12/07/24  0407   WBC 6.9     Lab Results   Component Value Date/Time     12/07/2024 04:07 AM    K 4.0 12/07/2024 04:07 AM     12/07/2024 04:07 AM    CO2 26 
Called for persitent elevation in blood pressure   Despite dialysis ,clonidine, labetolol belemodoinkorey  Reviewed chart her bp has been over 210 since 11 am   At this point likely needs Cardene drip   Will tx to icu and start   
Hemodialysis at bedside. 3.5 hrs completed. 2 K+ and 3 Ca+ dialysate used today. L upper arm AVF access patent. 3 Liters removed per pt's request. No prolonged bleeding post de-cannulization. No c/o cramping or discomfort related to dialysis. Post HD report given to JAX Gonzalez RN.  
Pre-dialysis handoff received from JAX Gonzalez RN. A &O x4. L upper arm AVF patent. This RN wearing PPE.  
RENAL CONSULT PROGRESS NOTE   2024    Patient:  Sandrita Park  :  1995  Gender:  female  MRN #:  [id number]    Reason for Consult: Volume overload and hypertensive urgency     Subjective:   She is upset that I did listen to her, I did not tell ICU physician that she has always high blood pressure , that is why she was admitted in ICU   She thinks her BP is high due to pain and meloxicam does not help   Denied dyspnea and headache     Objective:    BP (!) 143/109   Pulse 76   Temp 98.4 °F (36.9 °C) (Oral)   Resp 18   Ht 1.676 m (5' 6\")   Wt 68.5 kg (151 lb 0.2 oz)   SpO2 100%   BMI 24.37 kg/m²     Physical Exam:    Pt awake,  alert and comfortable  Ext: Has edema and pulsation intact  CNS- Oriented to time , place and person     Laboratory Data:    Lab Results   Component Value Date    BUN 43 (H) 2024    BUN 31 (H) 2024    BUN 60 (H) 2024     2024     2024     2024    CO2 25 2024    CO2 28 2024    CO2 26 2024    GLUCOSE 103 (H) 2024    GLUCOSE 114 (H) 2024    GLUCOSE 105 (H) 2024     Lab Results   Component Value Date    WBC 5.7 2024    HGB 9.8 (L) 2024    HCT 30.7 (L) 2024     Lab Results   Component Value Date    CALCIUM 8.3 (L) 2024     No results found for: \"CHOLESTEROL\", \"HDL\"  No results found for: \"TURBIDITY\"    Imaging Reveiwed:    CXR-  MPRESSION:  Cardiomegaly and minimal interstitial edema.      Assessment:  Sandrita Park is a 29 y.o. year old female  with SLE , lupus nephritis leading to ESRD now on HD , Hypertension, Anemia of CKD , secondary Hyperparathyroidism   CHF presented with shortness of breath and swelling .  She had missed almost 2 weeks of dialysis . She has been non compliant with dietary restriction, medicine and dialysis prescription for very long time despite explaining risk of decrease survival/sudden cardiac death multiple times. Does not take any phosphate 
Spiritual Health History and Assessment/Progress Note  [facility]    Loneliness/Social Isolation, Emotional distress,  ,      Name: Sandrita Park MRN: [id number]    Age: 29 y.o.     Sex: female   Language: English   Anabaptism: Denominational   Acute metabolic encephalopathy     Date: 12/9/2024            Total Time Calculated: 5 min              Spiritual Assessment began in [address] SURGICAL/ONCOLOGY        Referral/Consult From: Multi-disciplinary team   Encounter Overview/Reason: Loneliness/Social Isolation  Service Provided For: Patient    Shanon, Belief, Meaning:   Patient unable to assess at this time  Family/Friends No family/friends present      Importance and Influence:  Patient unable to assess at this time  Family/Friends No family/friends present    Community:  Patient Other: She would not answer to the questions.  Family/Friends No family/friends present    Assessment and Plan of Care:     Patient Interventions include: She was not interested at all.  Family/Friends Interventions include: No family/friends present    Patient Plan of Care: No spiritual needs identified for follow-up  Family/Friends Plan of Care: No family/friends present    Electronically signed by Chaplain Funmilayo on 12/9/2024 at 11:35 AM   
mcg Oral QAM AC Lydia Freeman MD   100 mcg at 12/08/24 0655    pantoprazole (PROTONIX) tablet 40 mg  40 mg Oral BID Lydia Freeman MD   40 mg at 12/08/24 0809    predniSONE (DELTASONE) tablet 10 mg  10 mg Oral Daily Lydia Freeman MD   10 mg at 12/08/24 0809    sodium chloride flush 0.9 % injection 5-40 mL  5-40 mL IntraVENous 2 times per day Lydia Freeman MD   10 mL at 12/08/24 0905    [Held by provider] heparin (porcine) injection 5,000 Units  5,000 Units SubCUTAneous q8h Lydia Freeman MD        ondansetron (ZOFRAN-ODT) disintegrating tablet 4 mg  4 mg Oral Q8H PRN Lydia Freeman MD        Or    ondansetron (ZOFRAN) injection 4 mg  4 mg IntraVENous Q6H PRN Lydia Freeman MD        polyethylene glycol (GLYCOLAX) packet 17 g  17 g Oral Daily PRN Lydia Freeman MD        acetaminophen (TYLENOL) tablet 650 mg  650 mg Oral Q6H PRN Lydia Freeman MD        Or    acetaminophen (TYLENOL) suppository 650 mg  650 mg Rectal Q6H PRN Lydia Freeman MD        melatonin tablet 3 mg  3 mg Oral Nightly Lydia Freeman MD   3 mg at 12/07/24 2031    labetalol (NORMODYNE;TRANDATE) injection 10 mg  10 mg IntraVENous Q2H PRN Lydia Freeman MD   10 mg at 12/08/24 1216    calcium carbonate (TUMS) chewable tablet 1,000 mg  1,000 mg Oral TID  Yusef Martin MD   1,000 mg at 12/08/24 0809    heparin (porcine) injection 2,000 Units  2,000 Units IntraCATHeter PRN Yusef Martin MD   2,000 Units at 12/06/24 1800   Total time spent 61 minutes

## 2024-12-09 NOTE — PLAN OF CARE
Intervention: Monitor/Manage Fluid Electrolyte/Acid Base Balance     PROBLEM: HEMODIALYSIS ADULT     INTERVENTION: Hemodynamic stabilization  Maintain BP WNL for this patient while on hemodialysis     INTERVENTION: Fluid Management  Will attempt 3500 ml total fluid removal as tolerated     INTERVENTION: Metabolic / Electrolyte Imbalance Management  2 K+ potassium,  3 Ca calcium bath today with dialysis     GOAL: Signs and symptoms of listed potential problems will be absent or manageable  (reference Hemodialysis ADULT CPG)     OUTCOME: Progressing  HD planned for 3.5 hours today        Problem: Chronic Renal Failure  Goal: *Fluid and electrolytes stabilized  Outcome: Progressing Towards Goal     Problem: Patient Education: Go to Patient Education Activity  Goal: Patient/Family Education  Outcome: Progressing Towards Goal

## 2024-12-26 ENCOUNTER — APPOINTMENT (OUTPATIENT)
Facility: HOSPITAL | Age: 29
DRG: 304 | End: 2024-12-26
Payer: MEDICARE

## 2024-12-26 ENCOUNTER — HOSPITAL ENCOUNTER (INPATIENT)
Facility: HOSPITAL | Age: 29
LOS: 1 days | Discharge: HOME | DRG: 304 | End: 2024-12-28
Attending: EMERGENCY MEDICINE | Admitting: INTERNAL MEDICINE
Payer: MEDICARE

## 2024-12-26 DIAGNOSIS — G93.41 METABOLIC ENCEPHALOPATHY: ICD-10-CM

## 2024-12-26 DIAGNOSIS — I10 UNCONTROLLED HYPERTENSION: ICD-10-CM

## 2024-12-26 DIAGNOSIS — R06.02 SOB (SHORTNESS OF BREATH): ICD-10-CM

## 2024-12-26 DIAGNOSIS — R94.31 ABNORMAL EKG: ICD-10-CM

## 2024-12-26 DIAGNOSIS — I16.1 HYPERTENSIVE EMERGENCY: Primary | ICD-10-CM

## 2024-12-26 PROBLEM — M32.14 LUPUS NEPHRITIS (HCC): Chronic | Status: ACTIVE | Noted: 2022-04-19

## 2024-12-26 PROBLEM — Z99.2: Status: ACTIVE | Noted: 2024-12-26

## 2024-12-26 PROBLEM — F31.9 BIPOLAR 1 DISORDER (HCC): Chronic | Status: ACTIVE | Noted: 2024-12-07

## 2024-12-26 PROBLEM — I42.8 NONISCHEMIC CARDIOMYOPATHY (HCC): Chronic | Status: ACTIVE | Noted: 2024-12-07

## 2024-12-26 LAB
ALBUMIN SERPL-MCNC: 3.1 G/DL (ref 3.4–5)
ALBUMIN SERPL-MCNC: 3.1 G/DL (ref 3.4–5)
ALBUMIN/GLOB SERPL: 0.7 (ref 0.8–1.7)
ALBUMIN/GLOB SERPL: 0.8 (ref 0.8–1.7)
ALP SERPL-CCNC: 62 U/L (ref 45–117)
ALP SERPL-CCNC: 67 U/L (ref 45–117)
ALT SERPL-CCNC: 11 U/L (ref 13–56)
ALT SERPL-CCNC: 12 U/L (ref 13–56)
AMMONIA PLAS-SCNC: <10 UMOL/L (ref 11–32)
ANION GAP SERPL CALC-SCNC: 14 MMOL/L (ref 3–18)
ANION GAP SERPL CALC-SCNC: 8 MMOL/L (ref 3–18)
APAP SERPL-MCNC: <2 UG/ML (ref 10–30)
APPEARANCE UR: CLEAR
AST SERPL-CCNC: 5 U/L (ref 10–38)
AST SERPL-CCNC: 9 U/L (ref 10–38)
BACTERIA URNS QL MICRO: ABNORMAL /HPF
BASOPHILS # BLD: 0 K/UL (ref 0–0.1)
BASOPHILS NFR BLD: 0 % (ref 0–2)
BILIRUB SERPL-MCNC: 0.3 MG/DL (ref 0.2–1)
BILIRUB SERPL-MCNC: 0.5 MG/DL (ref 0.2–1)
BILIRUB UR QL: NEGATIVE
BUN SERPL-MCNC: 104 MG/DL (ref 7–18)
BUN SERPL-MCNC: 28 MG/DL (ref 7–18)
BUN/CREAT SERPL: 4 (ref 12–20)
BUN/CREAT SERPL: 6 (ref 12–20)
CALCIUM SERPL-MCNC: 7.4 MG/DL (ref 8.5–10.1)
CALCIUM SERPL-MCNC: 9.8 MG/DL (ref 8.5–10.1)
CHLORIDE SERPL-SCNC: 105 MMOL/L (ref 100–111)
CHLORIDE SERPL-SCNC: 107 MMOL/L (ref 100–111)
CO2 SERPL-SCNC: 18 MMOL/L (ref 21–32)
CO2 SERPL-SCNC: 27 MMOL/L (ref 21–32)
COLOR UR: YELLOW
CREAT SERPL-MCNC: 18.1 MG/DL (ref 0.6–1.3)
CREAT SERPL-MCNC: 6.5 MG/DL (ref 0.6–1.3)
DIFFERENTIAL METHOD BLD: ABNORMAL
EKG ATRIAL RATE: 80 BPM
EKG DIAGNOSIS: NORMAL
EKG P AXIS: 64 DEGREES
EKG P-R INTERVAL: 184 MS
EKG Q-T INTERVAL: 456 MS
EKG QRS DURATION: 94 MS
EKG QTC CALCULATION (BAZETT): 525 MS
EKG R AXIS: 66 DEGREES
EKG T AXIS: 69 DEGREES
EKG VENTRICULAR RATE: 80 BPM
EOSINOPHIL # BLD: 0.3 K/UL (ref 0–0.4)
EOSINOPHIL NFR BLD: 3 % (ref 0–5)
EPITH CASTS URNS QL MICRO: ABNORMAL /LPF (ref 0–5)
ERYTHROCYTE [DISTWIDTH] IN BLOOD BY AUTOMATED COUNT: 20.5 % (ref 11.6–14.5)
ETHANOL SERPL-MCNC: <3 MG/DL (ref 0–3)
FLUAV RNA SPEC QL NAA+PROBE: NOT DETECTED
FLUBV RNA SPEC QL NAA+PROBE: NOT DETECTED
GLOBULIN SER CALC-MCNC: 3.9 G/DL (ref 2–4)
GLOBULIN SER CALC-MCNC: 4.2 G/DL (ref 2–4)
GLUCOSE BLD STRIP.AUTO-MCNC: 69 MG/DL (ref 70–110)
GLUCOSE BLD STRIP.AUTO-MCNC: 74 MG/DL (ref 70–110)
GLUCOSE SERPL-MCNC: 80 MG/DL (ref 74–99)
GLUCOSE SERPL-MCNC: 83 MG/DL (ref 74–99)
GLUCOSE UR STRIP.AUTO-MCNC: 100 MG/DL
HCT VFR BLD AUTO: 31.2 % (ref 35–45)
HGB BLD-MCNC: 9.7 G/DL (ref 12–16)
HGB UR QL STRIP: ABNORMAL
IMM GRANULOCYTES # BLD AUTO: 0 K/UL (ref 0–0.04)
IMM GRANULOCYTES NFR BLD AUTO: 0 % (ref 0–0.5)
KETONES UR QL STRIP.AUTO: NEGATIVE MG/DL
LACTATE SERPL-SCNC: 0.6 MMOL/L (ref 0.4–2)
LEUKOCYTE ESTERASE UR QL STRIP.AUTO: NEGATIVE
LIPASE SERPL-CCNC: 53 U/L (ref 13–75)
LYMPHOCYTES # BLD: 0.7 K/UL (ref 0.9–3.6)
LYMPHOCYTES NFR BLD: 7 % (ref 21–52)
MCH RBC QN AUTO: 27.5 PG (ref 24–34)
MCHC RBC AUTO-ENTMCNC: 31.1 G/DL (ref 31–37)
MCV RBC AUTO: 88.4 FL (ref 78–100)
MONOCYTES # BLD: 0.8 K/UL (ref 0.05–1.2)
MONOCYTES NFR BLD: 8 % (ref 3–10)
NEUTS SEG # BLD: 7.8 K/UL (ref 1.8–8)
NEUTS SEG NFR BLD: 82 % (ref 40–73)
NITRITE UR QL STRIP.AUTO: NEGATIVE
NRBC # BLD: 0 K/UL (ref 0–0.01)
NRBC BLD-RTO: 0 PER 100 WBC
NT PRO BNP: ABNORMAL PG/ML (ref 0–450)
PH UR STRIP: 7.5 (ref 5–8)
PLATELET # BLD AUTO: 115 K/UL (ref 135–420)
PLATELET COMMENT: ABNORMAL
POTASSIUM SERPL-SCNC: 3.7 MMOL/L (ref 3.5–5.5)
POTASSIUM SERPL-SCNC: 6.1 MMOL/L (ref 3.5–5.5)
PROT SERPL-MCNC: 7 G/DL (ref 6.4–8.2)
PROT SERPL-MCNC: 7.3 G/DL (ref 6.4–8.2)
PROT UR STRIP-MCNC: 30 MG/DL
RBC # BLD AUTO: 3.53 M/UL (ref 4.2–5.3)
RBC #/AREA URNS HPF: ABNORMAL /HPF (ref 0–5)
RBC MORPH BLD: ABNORMAL
SALICYLATES SERPL-MCNC: 2 MG/DL (ref 2.8–20)
SARS-COV-2 RNA RESP QL NAA+PROBE: NOT DETECTED
SODIUM SERPL-SCNC: 139 MMOL/L (ref 136–145)
SODIUM SERPL-SCNC: 140 MMOL/L (ref 136–145)
SOURCE: NORMAL
SP GR UR REFRACTOMETRY: 1.01 (ref 1–1.03)
TROPONIN I SERPL HS-MCNC: 31 NG/L (ref 0–54)
TSH SERPL DL<=0.05 MIU/L-ACNC: 8.12 UIU/ML (ref 0.36–3.74)
UROBILINOGEN UR QL STRIP.AUTO: 0.2 EU/DL (ref 0.2–1)
WBC # BLD AUTO: 9.6 K/UL (ref 4.6–13.2)
WBC URNS QL MICRO: NEGATIVE /HPF (ref 0–5)

## 2024-12-26 PROCEDURE — 82077 ASSAY SPEC XCP UR&BREATH IA: CPT

## 2024-12-26 PROCEDURE — 84484 ASSAY OF TROPONIN QUANT: CPT

## 2024-12-26 PROCEDURE — 83605 ASSAY OF LACTIC ACID: CPT

## 2024-12-26 PROCEDURE — 80179 DRUG ASSAY SALICYLATE: CPT

## 2024-12-26 PROCEDURE — 96376 TX/PRO/DX INJ SAME DRUG ADON: CPT

## 2024-12-26 PROCEDURE — 90935 HEMODIALYSIS ONE EVALUATION: CPT

## 2024-12-26 PROCEDURE — 71045 X-RAY EXAM CHEST 1 VIEW: CPT

## 2024-12-26 PROCEDURE — 2500000003 HC RX 250 WO HCPCS: Performed by: INTERNAL MEDICINE

## 2024-12-26 PROCEDURE — 87636 SARSCOV2 & INF A&B AMP PRB: CPT

## 2024-12-26 PROCEDURE — 85025 COMPLETE CBC W/AUTO DIFF WBC: CPT

## 2024-12-26 PROCEDURE — 84439 ASSAY OF FREE THYROXINE: CPT

## 2024-12-26 PROCEDURE — 82140 ASSAY OF AMMONIA: CPT

## 2024-12-26 PROCEDURE — 96366 THER/PROPH/DIAG IV INF ADDON: CPT

## 2024-12-26 PROCEDURE — 81001 URINALYSIS AUTO W/SCOPE: CPT

## 2024-12-26 PROCEDURE — 6360000002 HC RX W HCPCS: Performed by: STUDENT IN AN ORGANIZED HEALTH CARE EDUCATION/TRAINING PROGRAM

## 2024-12-26 PROCEDURE — 87086 URINE CULTURE/COLONY COUNT: CPT

## 2024-12-26 PROCEDURE — 99285 EMERGENCY DEPT VISIT HI MDM: CPT

## 2024-12-26 PROCEDURE — 84443 ASSAY THYROID STIM HORMONE: CPT

## 2024-12-26 PROCEDURE — 84481 FREE ASSAY (FT-3): CPT

## 2024-12-26 PROCEDURE — 83690 ASSAY OF LIPASE: CPT

## 2024-12-26 PROCEDURE — 96375 TX/PRO/DX INJ NEW DRUG ADDON: CPT

## 2024-12-26 PROCEDURE — 6360000002 HC RX W HCPCS: Performed by: INTERNAL MEDICINE

## 2024-12-26 PROCEDURE — 80053 COMPREHEN METABOLIC PANEL: CPT

## 2024-12-26 PROCEDURE — 83880 ASSAY OF NATRIURETIC PEPTIDE: CPT

## 2024-12-26 PROCEDURE — 96365 THER/PROPH/DIAG IV INF INIT: CPT

## 2024-12-26 PROCEDURE — 70450 CT HEAD/BRAIN W/O DYE: CPT

## 2024-12-26 PROCEDURE — 96374 THER/PROPH/DIAG INJ IV PUSH: CPT

## 2024-12-26 PROCEDURE — 2580000003 HC RX 258: Performed by: INTERNAL MEDICINE

## 2024-12-26 PROCEDURE — 2580000003 HC RX 258: Performed by: EMERGENCY MEDICINE

## 2024-12-26 PROCEDURE — 82962 GLUCOSE BLOOD TEST: CPT

## 2024-12-26 PROCEDURE — 93005 ELECTROCARDIOGRAM TRACING: CPT | Performed by: EMERGENCY MEDICINE

## 2024-12-26 PROCEDURE — 6360000002 HC RX W HCPCS: Performed by: EMERGENCY MEDICINE

## 2024-12-26 PROCEDURE — 5A1D70Z PERFORMANCE OF URINARY FILTRATION, INTERMITTENT, LESS THAN 6 HOURS PER DAY: ICD-10-PCS | Performed by: STUDENT IN AN ORGANIZED HEALTH CARE EDUCATION/TRAINING PROGRAM

## 2024-12-26 PROCEDURE — 96367 TX/PROPH/DG ADDL SEQ IV INF: CPT

## 2024-12-26 PROCEDURE — 80164 ASSAY DIPROPYLACETIC ACD TOT: CPT

## 2024-12-26 PROCEDURE — 80143 DRUG ASSAY ACETAMINOPHEN: CPT

## 2024-12-26 PROCEDURE — 6370000000 HC RX 637 (ALT 250 FOR IP): Performed by: EMERGENCY MEDICINE

## 2024-12-26 RX ORDER — HEPARIN SODIUM 5000 [USP'U]/ML
5000 INJECTION, SOLUTION INTRAVENOUS; SUBCUTANEOUS EVERY 8 HOURS SCHEDULED
Status: DISCONTINUED | OUTPATIENT
Start: 2024-12-26 | End: 2024-12-28 | Stop reason: HOSPADM

## 2024-12-26 RX ORDER — SENNOSIDES 8.8 MG/5ML
5 LIQUID ORAL 2 TIMES DAILY PRN
Status: DISCONTINUED | OUTPATIENT
Start: 2024-12-26 | End: 2024-12-28 | Stop reason: HOSPADM

## 2024-12-26 RX ORDER — CALCIUM GLUCONATE 20 MG/ML
1000 INJECTION, SOLUTION INTRAVENOUS ONCE
Status: COMPLETED | OUTPATIENT
Start: 2024-12-26 | End: 2024-12-26

## 2024-12-26 RX ORDER — AMLODIPINE BESYLATE 5 MG/1
10 TABLET ORAL DAILY
Status: DISCONTINUED | OUTPATIENT
Start: 2024-12-26 | End: 2024-12-28 | Stop reason: HOSPADM

## 2024-12-26 RX ORDER — LABETALOL HYDROCHLORIDE 5 MG/ML
10 INJECTION, SOLUTION INTRAVENOUS ONCE
Status: COMPLETED | OUTPATIENT
Start: 2024-12-26 | End: 2024-12-26

## 2024-12-26 RX ORDER — LEVOTHYROXINE SODIUM 100 UG/1
100 TABLET ORAL
Status: DISCONTINUED | OUTPATIENT
Start: 2024-12-27 | End: 2024-12-28 | Stop reason: HOSPADM

## 2024-12-26 RX ORDER — SODIUM CHLORIDE 0.9 % (FLUSH) 0.9 %
5-40 SYRINGE (ML) INJECTION PRN
Status: DISCONTINUED | OUTPATIENT
Start: 2024-12-26 | End: 2024-12-28 | Stop reason: HOSPADM

## 2024-12-26 RX ORDER — BUMETANIDE 0.25 MG/ML
2 INJECTION, SOLUTION INTRAMUSCULAR; INTRAVENOUS EVERY MORNING
Status: DISCONTINUED | OUTPATIENT
Start: 2024-12-27 | End: 2024-12-27

## 2024-12-26 RX ORDER — IPRATROPIUM BROMIDE AND ALBUTEROL SULFATE 2.5; .5 MG/3ML; MG/3ML
3 SOLUTION RESPIRATORY (INHALATION)
Status: COMPLETED | OUTPATIENT
Start: 2024-12-26 | End: 2024-12-26

## 2024-12-26 RX ORDER — MAGNESIUM SULFATE IN WATER 40 MG/ML
2000 INJECTION, SOLUTION INTRAVENOUS
Status: COMPLETED | OUTPATIENT
Start: 2024-12-26 | End: 2024-12-26

## 2024-12-26 RX ORDER — CLONIDINE HYDROCHLORIDE 0.1 MG/1
0.1 TABLET ORAL
Status: DISCONTINUED | OUTPATIENT
Start: 2024-12-26 | End: 2024-12-26

## 2024-12-26 RX ORDER — ACETAMINOPHEN 325 MG/1
650 TABLET ORAL EVERY 6 HOURS PRN
Status: DISCONTINUED | OUTPATIENT
Start: 2024-12-26 | End: 2024-12-28 | Stop reason: HOSPADM

## 2024-12-26 RX ORDER — ESCITALOPRAM OXALATE 10 MG/1
10 TABLET ORAL DAILY
Status: DISCONTINUED | OUTPATIENT
Start: 2024-12-27 | End: 2024-12-28 | Stop reason: HOSPADM

## 2024-12-26 RX ORDER — POLYETHYLENE GLYCOL 3350 17 G/17G
17 POWDER, FOR SOLUTION ORAL DAILY PRN
Status: DISCONTINUED | OUTPATIENT
Start: 2024-12-26 | End: 2024-12-28 | Stop reason: HOSPADM

## 2024-12-26 RX ORDER — ACETAMINOPHEN 650 MG/1
650 SUPPOSITORY RECTAL EVERY 6 HOURS PRN
Status: DISCONTINUED | OUTPATIENT
Start: 2024-12-26 | End: 2024-12-28 | Stop reason: HOSPADM

## 2024-12-26 RX ORDER — ALBUTEROL SULFATE 0.83 MG/ML
2.5 SOLUTION RESPIRATORY (INHALATION) EVERY 4 HOURS PRN
Status: DISCONTINUED | OUTPATIENT
Start: 2024-12-26 | End: 2024-12-28 | Stop reason: HOSPADM

## 2024-12-26 RX ORDER — BISACODYL 5 MG/1
5 TABLET, DELAYED RELEASE ORAL DAILY
Status: DISCONTINUED | OUTPATIENT
Start: 2024-12-27 | End: 2024-12-28 | Stop reason: HOSPADM

## 2024-12-26 RX ORDER — SODIUM CHLORIDE 9 MG/ML
INJECTION, SOLUTION INTRAVENOUS PRN
Status: DISCONTINUED | OUTPATIENT
Start: 2024-12-26 | End: 2024-12-28 | Stop reason: HOSPADM

## 2024-12-26 RX ORDER — LABETALOL HYDROCHLORIDE 5 MG/ML
20 INJECTION, SOLUTION INTRAVENOUS EVERY 4 HOURS
Status: DISCONTINUED | OUTPATIENT
Start: 2024-12-27 | End: 2024-12-27

## 2024-12-26 RX ORDER — ALBUTEROL SULFATE 90 UG/1
1 INHALANT RESPIRATORY (INHALATION) EVERY 4 HOURS PRN
Status: DISCONTINUED | OUTPATIENT
Start: 2024-12-26 | End: 2024-12-26

## 2024-12-26 RX ORDER — SODIUM CHLORIDE 0.9 % (FLUSH) 0.9 %
5-40 SYRINGE (ML) INJECTION EVERY 12 HOURS SCHEDULED
Status: DISCONTINUED | OUTPATIENT
Start: 2024-12-26 | End: 2024-12-28 | Stop reason: HOSPADM

## 2024-12-26 RX ORDER — LABETALOL 100 MG/1
100 TABLET, FILM COATED ORAL EVERY 12 HOURS SCHEDULED
Status: DISCONTINUED | OUTPATIENT
Start: 2024-12-26 | End: 2024-12-28

## 2024-12-26 RX ORDER — ONDANSETRON 2 MG/ML
4 INJECTION INTRAMUSCULAR; INTRAVENOUS EVERY 6 HOURS PRN
Status: DISCONTINUED | OUTPATIENT
Start: 2024-12-26 | End: 2024-12-28 | Stop reason: HOSPADM

## 2024-12-26 RX ORDER — BUMETANIDE 0.25 MG/ML
2 INJECTION, SOLUTION INTRAMUSCULAR; INTRAVENOUS
Status: COMPLETED | OUTPATIENT
Start: 2024-12-26 | End: 2024-12-26

## 2024-12-26 RX ORDER — CLONIDINE 0.2 MG/24H
1 PATCH, EXTENDED RELEASE TRANSDERMAL WEEKLY
Status: DISCONTINUED | OUTPATIENT
Start: 2025-01-02 | End: 2024-12-28

## 2024-12-26 RX ORDER — LABETALOL HYDROCHLORIDE 5 MG/ML
10 INJECTION, SOLUTION INTRAVENOUS EVERY 6 HOURS PRN
Status: DISCONTINUED | OUTPATIENT
Start: 2024-12-26 | End: 2024-12-28 | Stop reason: HOSPADM

## 2024-12-26 RX ORDER — LOSARTAN POTASSIUM 50 MG/1
100 TABLET ORAL DAILY
Status: DISCONTINUED | OUTPATIENT
Start: 2024-12-26 | End: 2024-12-28 | Stop reason: HOSPADM

## 2024-12-26 RX ADMIN — LABETALOL HYDROCHLORIDE 10 MG: 5 INJECTION, SOLUTION INTRAVENOUS at 16:06

## 2024-12-26 RX ADMIN — SODIUM CHLORIDE 5 MG/HR: 9 INJECTION, SOLUTION INTRAVENOUS at 19:51

## 2024-12-26 RX ADMIN — SODIUM ZIRCONIUM CYCLOSILICATE 5 G: 5 POWDER, FOR SUSPENSION ORAL at 09:44

## 2024-12-26 RX ADMIN — FAMOTIDINE 10 MG: 10 INJECTION, SOLUTION INTRAVENOUS at 23:19

## 2024-12-26 RX ADMIN — BUMETANIDE 2 MG: 0.25 INJECTION INTRAMUSCULAR; INTRAVENOUS at 10:28

## 2024-12-26 RX ADMIN — LABETALOL HYDROCHLORIDE 10 MG: 5 INJECTION, SOLUTION INTRAVENOUS at 12:48

## 2024-12-26 RX ADMIN — CALCIUM GLUCONATE 1000 MG: 20 INJECTION, SOLUTION INTRAVENOUS at 09:16

## 2024-12-26 RX ADMIN — LABETALOL HYDROCHLORIDE 10 MG: 5 INJECTION, SOLUTION INTRAVENOUS at 10:30

## 2024-12-26 RX ADMIN — SODIUM CHLORIDE 7.5 MG/HR: 9 INJECTION, SOLUTION INTRAVENOUS at 23:42

## 2024-12-26 RX ADMIN — IPRATROPIUM BROMIDE AND ALBUTEROL SULFATE 3 DOSE: .5; 2.5 SOLUTION RESPIRATORY (INHALATION) at 09:56

## 2024-12-26 RX ADMIN — MAGNESIUM SULFATE HEPTAHYDRATE 2000 MG: 40 INJECTION, SOLUTION INTRAVENOUS at 11:46

## 2024-12-26 RX ADMIN — SODIUM CHLORIDE, PRESERVATIVE FREE 10 ML: 5 INJECTION INTRAVENOUS at 23:28

## 2024-12-26 ASSESSMENT — PAIN SCALES - WONG BAKER
WONGBAKER_NUMERICALRESPONSE: NO HURT
WONGBAKER_NUMERICALRESPONSE: HURTS EVEN MORE

## 2024-12-26 ASSESSMENT — PAIN DESCRIPTION - LOCATION: LOCATION: LEG

## 2024-12-26 NOTE — ED TRIAGE NOTES
Presents to ED with mother, lethargic and struggling to stay awake since this morning. Is TTS Dialysis patient, supposed to have dialysis today. Patient is rousable, but difficult to keep awake. Pt's mother reports pt having renal failure, lupus nephritis, hypertension, CHF. Pt's mother reports that pt didn't have dialysis on Tuesday this week and that may be the cause of this episode. Additionally, last night pt c/o mouth pain and took 325mg of Tylenol, pt's mother states it was regular tylenol and not a narcotic.

## 2024-12-26 NOTE — PROGRESS NOTES
Patient:  Sandrita Park  :  1995  Gender:  female  MRN #:  [id number]    Assessment:      Sandrita Park is a 29 y.o. year old female  with SLE , lupus nephritis leading to ESRD now on HD , Hypertension, Anemia of CKD , secondary Hyperparathyroidism   CHF presented with confusion . She was admitted in this earlier this month for missing HD for 2 weeks , this time also she missed HD 1 week despite reminder from dialysis unit each session   I had explained the risk of missing dialysis many times which includes uremic encephalopathy , respiratory failure, stroke due to to uncontrolled hypertension , cardiac arrest and death , she is aware of all such risk   She is now uremic and has uncontrolled hypertension       ESRD  Volume overload/CHF   Hypertensive urgency   Anemia of CKD   Hyperphosphatemia   Uremia       Plan:   Patient examined and labs reviewed. Will plan urgent dialysis for volume removal and clearance   Order placed and informed nurse  Will give Labetalol 10 mg iv once now , resume oral labetalol, losartan and amlodipine  Bumex 2 mg iv once   If mental status improve and BP drops below 180 mmHg post dialysis she can go home  She should go to outpatient dialysis unit coming Saturday           Subjective/Interval Events      She is confused , unable to provide details history   BP is high breathing seems okay on nasal canula       Blood pressure (!) 213/115, pulse 78, temperature 98 °F (36.7 °C), temperature source Oral, resp. rate 16, height 1.676 m (5' 6\"), weight 63.5 kg (140 lb), SpO2 100%.    Exam:    Pt confused, uremic     Lung: has crackles and wheeze  Heart: s1s2 regular    Ext: 1 + edema   Not oriented     [unfilled]   Recent Labs     24  0900   WBC 9.6   HCT 31.2*   MCV 88.4     Recent Labs     24  0900      CO2 18*   ANIONGAP 14   CALCIUM 7.4*   *   GLUCOSE 80     Recent Labs     24  0900   ALBUMIN 3.1*   ALKPHOS 62       Medical Decision Making :    I have

## 2024-12-26 NOTE — PLAN OF CARE
Problem: Hemodialysis  Goal: Safe, Effective Therapy Delivery  UF goal: 4500L   UF Net: 4000mL BFR: 400  Outcome: Progressing  Intervention: Optimize Device Care and Function Circuit Management:   air detection alarms on   circuit line warming device in use  Medication Review/Management: medications reviewed  Notify MD as needed for orders to maintain UF goal and BFR.   Monitor vitals q 15 minutes or more frequently (ex: hypotension/hypertension)     Goal: Effective Tissue Perfusion  Outcome: Progressing    Goal: Absence of Infection Signs and Symptoms  Outcome: Progressing  Intervention: Prevent or Manage Infection    Infection Prevention:   hand hygiene promoted   personal protective equipment utilized   rest/sleep promoted  Infection Management: aseptic technique maintained    Problem: Chronic Conditions and Co-morbidities  Goal: Patient's chronic conditions and co-morbidity symptoms are monitored and maintained or improved  Outcome: Progressing  Care Plan - Patient's Chronic Conditions and Co-Morbidity Symptoms are Monitored and Maintained or Improved:   Collaborate with multidisciplinary team to address chronic and comorbid conditions and prevent exacerbation or deterioration   Monitor and assess patient's chronic conditions and comorbid symptoms for stability, deterioration, or improvement     Problem: Safety - Adult  Goal: Free from fall injury  Outcome: Progressing  Free From Fall Injury:   Instruct family/caregiver on patient safety   Based on caregiver fall risk screen, instruct family/caregiver to ask for assistance with transferring infant if caregiver noted to have fall risk factors     Problem: Pain  Goal: Verbalizes/displays adequate comfort level or baseline comfort level  Outcome: Progressing  Outcome: Progressing  Verbalizes/displays adequate comfort level or baseline comfort level:   Encourage patient to monitor pain and request assistance   Administer analgesics based on type and severity of pain  and evaluate response   Consider cultural and social influences on pain and pain management   Assess pain using appropriate pain scale   Implement non-pharmacological measures as appropriate and evaluate response   Notify Licensed Independent Practitioner if interventions unsuccessful or patient reports new pain

## 2024-12-26 NOTE — ED PROVIDER NOTES
EMERGENCY DEPARTMENT HISTORY AND PHYSICAL EXAM    8:29 PM      Date: 12/26/2024  Patient Name: Sandrita Park    History of Presenting Illness     Chief Complaint   Patient presents with    Altered Mental Status       History From: Patient's Mother    Sandrita Park is a 29 y.o. female   29-year-old female with past medical history of lupus nephritis on dialysis though, noncompliant, hypertension, hypothyroidism, previously presented with uremic encephalopathy comes in today for altered mental status.  Mom states that she has missed dialysis on Tuesday.  Mom states that they missed the dialysis appointment because the transportation never came to the house even though they were supposed to come to the house.  Mom states this is not the first time that this has happened.  Mom states that yesterday she was totally okay but then this morning when she went to check on her the patient was very lethargic.  Hospital records reviewed and patient did have an admission recently for encephalopathy and mom states that she presented similar to today.    Patient is unable to provide any history as she is somnolent on exam.               Nursing Notes were all reviewed and agreed with or any disagreements were addressed in the HPI.    PCP: Jj Mo MD    Current Facility-Administered Medications   Medication Dose Route Frequency Provider Last Rate Last Admin    cloNIDine (CATAPRES) tablet 0.1 mg  0.1 mg Oral NOW Sagar Yepez MD        labetalol (NORMODYNE;TRANDATE) injection 10 mg  10 mg IntraVENous Q6H PRN Yusef Martin MD   10 mg at 12/26/24 1606    losartan (COZAAR) tablet 100 mg  100 mg Oral Daily Yusef Martin MD        amLODIPine (NORVASC) tablet 10 mg  10 mg Oral Daily Yusef Martin MD        labetalol (NORMODYNE) tablet 100 mg  100 mg Oral 2 times per day Yusef Martin MD        niCARdipine (CARDENE) 25 mg in sodium chloride 0.9 % 250 mL infusion  2.5-15 mg/hr IntraVENous Continuous Sagar Yepez MD 75 mL/hr  mL IVPB premix (0 mg IntraVENous Stopped 12/26/24 1246)   ipratropium 0.5 mg-albuterol 2.5 mg (DUONEB) nebulizer solution 3 Dose (3 Doses Inhalation Given 12/26/24 0956)   bumetanide (BUMEX) injection 2 mg (2 mg IntraVENous Given 12/26/24 1028)   labetalol (NORMODYNE;TRANDATE) injection 10 mg (10 mg IntraVENous Given 12/26/24 1030)       CONSULTS: (Who and What was discussed)  Nephrology; dialysis, nicardipine drip bc unable to tolerate PO BP meds    Chronic Conditions: as above    Social Determinants affecting Dx or Tx: Patient lacks transportation.     Records Reviewed (source and summary of external notes): Nursing Notes and Old Medical Records    Procedures    Critical Care Time: 90 minutes separate from other billable procedures    SCREENING TOOLS:  None    CLINICAL MANAGEMENT TOOLS:  Not Applicable    Is this patient to be included in the SEP-1 core measure? No Exclusion criteria - the patient is NOT to be included for SEP-1 Core Measure due to: 2+ SIRS criteria are not met         Diagnosis     Clinical Impression:   1. Hypertensive emergency    2. Metabolic encephalopathy        Disposition: admit to icu    No follow-up provider specified.     Disclaimer: Sections of this note are dictated using utilizing voice recognition software.  Minor typographical errors may be present. If questions arise, please do not hesitate to contact me or call our department.         Sagar Yepez MD  12/26/24 1040

## 2024-12-27 ENCOUNTER — APPOINTMENT (OUTPATIENT)
Facility: HOSPITAL | Age: 29
DRG: 304 | End: 2024-12-27
Attending: INTERNAL MEDICINE
Payer: MEDICARE

## 2024-12-27 LAB
ALBUMIN SERPL-MCNC: 2.6 G/DL (ref 3.4–5)
ANION GAP SERPL CALC-SCNC: 8 MMOL/L (ref 3–18)
BACTERIA SPEC CULT: NORMAL
BUN SERPL-MCNC: 37 MG/DL (ref 7–18)
BUN/CREAT SERPL: 4 (ref 12–20)
CA-I SERPL-SCNC: 0.97 MMOL/L (ref 1.12–1.32)
CALCIUM SERPL-MCNC: 8.3 MG/DL (ref 8.5–10.1)
CC UR VC: NORMAL
CHLORIDE SERPL-SCNC: 107 MMOL/L (ref 100–111)
CO2 SERPL-SCNC: 24 MMOL/L (ref 21–32)
CORTIS AM PEAK SERPL-MCNC: 13.3 UG/DL (ref 4.3–22.45)
CREAT SERPL-MCNC: 9.29 MG/DL (ref 0.6–1.3)
ECHO AO ROOT DIAM: 3.5 CM
ECHO AO ROOT INDEX: 2.03 CM/M2
ECHO AV AREA PEAK VELOCITY: 2.7 CM2
ECHO AV AREA VTI: 2.3 CM2
ECHO AV AREA/BSA PEAK VELOCITY: 1.6 CM2/M2
ECHO AV AREA/BSA VTI: 1.3 CM2/M2
ECHO AV MEAN GRADIENT: 11 MMHG
ECHO AV MEAN VELOCITY: 1.6 M/S
ECHO AV PEAK GRADIENT: 18 MMHG
ECHO AV PEAK VELOCITY: 2.1 M/S
ECHO AV VELOCITY RATIO: 0.71
ECHO AV VTI: 38.5 CM
ECHO BSA: 1.72 M2
ECHO LA DIAMETER INDEX: 2.33 CM/M2
ECHO LA DIAMETER: 4 CM
ECHO LA TO AORTIC ROOT RATIO: 1.14
ECHO LA VOL A-L A2C: 78 ML (ref 22–52)
ECHO LA VOL A-L A4C: 101 ML (ref 22–52)
ECHO LA VOL BP: 88 ML (ref 22–52)
ECHO LA VOL MOD A2C: 76 ML (ref 22–52)
ECHO LA VOL MOD A4C: 96 ML (ref 22–52)
ECHO LA VOL/BSA BIPLANE: 51 ML/M2 (ref 16–34)
ECHO LA VOLUME AREA LENGTH: 92 ML
ECHO LA VOLUME INDEX A-L A2C: 45 ML/M2 (ref 16–34)
ECHO LA VOLUME INDEX A-L A4C: 59 ML/M2 (ref 16–34)
ECHO LA VOLUME INDEX AREA LENGTH: 53 ML/M2 (ref 16–34)
ECHO LA VOLUME INDEX MOD A2C: 44 ML/M2 (ref 16–34)
ECHO LA VOLUME INDEX MOD A4C: 56 ML/M2 (ref 16–34)
ECHO LV E' LATERAL VELOCITY: 9.27 CM/S
ECHO LV E' SEPTAL VELOCITY: 4.28 CM/S
ECHO LV EF PHYSICIAN: 55 %
ECHO LV EJECTION FRACTION A2C: 55 %
ECHO LV EJECTION FRACTION A4C: 49 %
ECHO LV FRACTIONAL SHORTENING: 30 % (ref 28–44)
ECHO LV GLOBAL LONGITUDINAL STRAIN (GLS): -13.5 %
ECHO LV INTERNAL DIMENSION DIASTOLE INDEX: 2.91 CM/M2
ECHO LV INTERNAL DIMENSION DIASTOLIC: 5 CM (ref 3.9–5.3)
ECHO LV INTERNAL DIMENSION SYSTOLIC INDEX: 2.03 CM/M2
ECHO LV INTERNAL DIMENSION SYSTOLIC: 3.5 CM
ECHO LV IVSD: 1.4 CM (ref 0.6–0.9)
ECHO LV MASS 2D: 306.8 G (ref 67–162)
ECHO LV MASS INDEX 2D: 178.4 G/M2 (ref 43–95)
ECHO LV POSTERIOR WALL DIASTOLIC: 1.5 CM (ref 0.6–0.9)
ECHO LV RELATIVE WALL THICKNESS RATIO: 0.6
ECHO LVOT AREA: 3.8 CM2
ECHO LVOT AV VTI INDEX: 0.59
ECHO LVOT DIAM: 2.2 CM
ECHO LVOT MEAN GRADIENT: 5 MMHG
ECHO LVOT PEAK GRADIENT: 9 MMHG
ECHO LVOT PEAK VELOCITY: 1.5 M/S
ECHO LVOT STROKE VOLUME INDEX: 50.4 ML/M2
ECHO LVOT SV: 86.6 ML
ECHO LVOT VTI: 22.8 CM
ECHO MV A VELOCITY: 1.83 M/S
ECHO MV AREA VTI: 1.7 CM2
ECHO MV E DECELERATION TIME (DT): 118.9 MS
ECHO MV E VELOCITY: 1.32 M/S
ECHO MV E/A RATIO: 0.72
ECHO MV E/E' LATERAL: 14.24
ECHO MV E/E' RATIO (AVERAGED): 22.54
ECHO MV E/E' SEPTAL: 30.84
ECHO MV LVOT VTI INDEX: 2.18
ECHO MV MAX VELOCITY: 2.4 M/S
ECHO MV MEAN GRADIENT: 8 MMHG
ECHO MV MEAN VELOCITY: 1.3 M/S
ECHO MV PEAK GRADIENT: 22 MMHG
ECHO MV VTI: 49.8 CM
ECHO PV MAX VELOCITY: 1.4 M/S
ECHO PV MEAN GRADIENT: 4 MMHG
ECHO PV MEAN VELOCITY: 1 M/S
ECHO PV PEAK GRADIENT: 8 MMHG
ECHO RA END SYSTOLIC VOLUME APICAL 4 CHAMBER INDEX BSA: 31 ML/M2
ECHO RA VOLUME: 53 ML
ECHO RV INTERNAL DIMENSION: 4.4 CM
ECHO RVOT MEAN GRADIENT: 3 MMHG
ECHO RVOT PEAK GRADIENT: 7 MMHG
ECHO RVOT PEAK VELOCITY: 1.3 M/S
ECHO RVOT VTI: 22.8 CM
ERYTHROCYTE [DISTWIDTH] IN BLOOD BY AUTOMATED COUNT: 20.7 % (ref 11.6–14.5)
GLUCOSE BLD STRIP.AUTO-MCNC: 79 MG/DL (ref 70–110)
GLUCOSE BLD STRIP.AUTO-MCNC: 85 MG/DL (ref 70–110)
GLUCOSE BLD STRIP.AUTO-MCNC: 90 MG/DL (ref 70–110)
GLUCOSE BLD STRIP.AUTO-MCNC: 96 MG/DL (ref 70–110)
GLUCOSE SERPL-MCNC: 92 MG/DL (ref 74–99)
HCT VFR BLD AUTO: 30.6 % (ref 35–45)
HGB BLD-MCNC: 9.6 G/DL (ref 12–16)
MCH RBC QN AUTO: 27.1 PG (ref 24–34)
MCHC RBC AUTO-ENTMCNC: 31.4 G/DL (ref 31–37)
MCV RBC AUTO: 86.4 FL (ref 78–100)
NRBC # BLD: 0 K/UL (ref 0–0.01)
NRBC BLD-RTO: 0 PER 100 WBC
PHOSPHATE SERPL-MCNC: 7.2 MG/DL (ref 2.5–4.9)
PLATELET # BLD AUTO: 84 K/UL (ref 135–420)
POTASSIUM SERPL-SCNC: 4.1 MMOL/L (ref 3.5–5.5)
RBC # BLD AUTO: 3.54 M/UL (ref 4.2–5.3)
SERVICE CMNT-IMP: NORMAL
SODIUM SERPL-SCNC: 139 MMOL/L (ref 136–145)
T3FREE SERPL-MCNC: 4.3 PG/ML (ref 2.18–3.98)
T4 FREE SERPL-MCNC: 1.1 NG/DL (ref 0.7–1.5)
VALPROATE SERPL-MCNC: <3 UG/ML (ref 50–100)
WBC # BLD AUTO: 5.6 K/UL (ref 4.6–13.2)

## 2024-12-27 PROCEDURE — 6360000002 HC RX W HCPCS: Performed by: STUDENT IN AN ORGANIZED HEALTH CARE EDUCATION/TRAINING PROGRAM

## 2024-12-27 PROCEDURE — 96372 THER/PROPH/DIAG INJ SC/IM: CPT

## 2024-12-27 PROCEDURE — 6370000000 HC RX 637 (ALT 250 FOR IP): Performed by: INTERNAL MEDICINE

## 2024-12-27 PROCEDURE — 96366 THER/PROPH/DIAG IV INF ADDON: CPT

## 2024-12-27 PROCEDURE — G0378 HOSPITAL OBSERVATION PER HR: HCPCS

## 2024-12-27 PROCEDURE — 6370000000 HC RX 637 (ALT 250 FOR IP): Performed by: HOSPITALIST

## 2024-12-27 PROCEDURE — 90935 HEMODIALYSIS ONE EVALUATION: CPT

## 2024-12-27 PROCEDURE — 82962 GLUCOSE BLOOD TEST: CPT

## 2024-12-27 PROCEDURE — 6360000002 HC RX W HCPCS: Performed by: HOSPITALIST

## 2024-12-27 PROCEDURE — 93321 DOPPLER ECHO F-UP/LMTD STD: CPT

## 2024-12-27 PROCEDURE — 96368 THER/DIAG CONCURRENT INF: CPT

## 2024-12-27 PROCEDURE — 2500000003 HC RX 250 WO HCPCS: Performed by: INTERNAL MEDICINE

## 2024-12-27 PROCEDURE — 85027 COMPLETE CBC AUTOMATED: CPT

## 2024-12-27 PROCEDURE — 82330 ASSAY OF CALCIUM: CPT

## 2024-12-27 PROCEDURE — 2580000003 HC RX 258: Performed by: INTERNAL MEDICINE

## 2024-12-27 PROCEDURE — 82533 TOTAL CORTISOL: CPT

## 2024-12-27 PROCEDURE — 92526 ORAL FUNCTION THERAPY: CPT

## 2024-12-27 PROCEDURE — 92610 EVALUATE SWALLOWING FUNCTION: CPT

## 2024-12-27 PROCEDURE — 80069 RENAL FUNCTION PANEL: CPT

## 2024-12-27 PROCEDURE — 6360000002 HC RX W HCPCS: Performed by: INTERNAL MEDICINE

## 2024-12-27 PROCEDURE — 36415 COLL VENOUS BLD VENIPUNCTURE: CPT

## 2024-12-27 PROCEDURE — 93308 TTE F-UP OR LMTD: CPT

## 2024-12-27 PROCEDURE — 82607 VITAMIN B-12: CPT

## 2024-12-27 PROCEDURE — 96367 TX/PROPH/DG ADDL SEQ IV INF: CPT

## 2024-12-27 PROCEDURE — 96376 TX/PRO/DX INJ SAME DRUG ADON: CPT

## 2024-12-27 PROCEDURE — 83921 ORGANIC ACID SINGLE QUANT: CPT

## 2024-12-27 RX ORDER — CALCIUM GLUCONATE 20 MG/ML
1000 INJECTION, SOLUTION INTRAVENOUS ONCE
Status: COMPLETED | OUTPATIENT
Start: 2024-12-27 | End: 2024-12-27

## 2024-12-27 RX ORDER — BUMETANIDE 0.25 MG/ML
2 INJECTION, SOLUTION INTRAMUSCULAR; INTRAVENOUS 2 TIMES DAILY
Status: DISCONTINUED | OUTPATIENT
Start: 2024-12-27 | End: 2024-12-28 | Stop reason: HOSPADM

## 2024-12-27 RX ORDER — LABETALOL HYDROCHLORIDE 5 MG/ML
20 INJECTION, SOLUTION INTRAVENOUS EVERY 4 HOURS
Status: DISCONTINUED | OUTPATIENT
Start: 2024-12-27 | End: 2024-12-28

## 2024-12-27 RX ORDER — LABETALOL HYDROCHLORIDE 5 MG/ML
10 INJECTION, SOLUTION INTRAVENOUS EVERY 6 HOURS PRN
Status: DISCONTINUED | OUTPATIENT
Start: 2024-12-27 | End: 2024-12-28 | Stop reason: HOSPADM

## 2024-12-27 RX ORDER — BUSPIRONE HYDROCHLORIDE 5 MG/1
10 TABLET ORAL 3 TIMES DAILY
Status: DISCONTINUED | OUTPATIENT
Start: 2024-12-27 | End: 2024-12-28 | Stop reason: HOSPADM

## 2024-12-27 RX ADMIN — AMLODIPINE BESYLATE 10 MG: 5 TABLET ORAL at 08:56

## 2024-12-27 RX ADMIN — BUMETANIDE 2 MG: 0.25 INJECTION INTRAMUSCULAR; INTRAVENOUS at 09:03

## 2024-12-27 RX ADMIN — VALPROATE SODIUM 250 MG: 100 INJECTION, SOLUTION INTRAVENOUS at 10:57

## 2024-12-27 RX ADMIN — VALPROATE SODIUM 250 MG: 100 INJECTION, SOLUTION INTRAVENOUS at 23:14

## 2024-12-27 RX ADMIN — BUSPIRONE HYDROCHLORIDE 10 MG: 5 TABLET ORAL at 08:56

## 2024-12-27 RX ADMIN — VALPROATE SODIUM 250 MG: 100 INJECTION, SOLUTION INTRAVENOUS at 17:52

## 2024-12-27 RX ADMIN — LABETALOL HYDROCHLORIDE 20 MG: 5 INJECTION, SOLUTION INTRAVENOUS at 03:20

## 2024-12-27 RX ADMIN — LABETALOL HYDROCHLORIDE 20 MG: 5 INJECTION, SOLUTION INTRAVENOUS at 06:49

## 2024-12-27 RX ADMIN — HEPARIN SODIUM 5000 UNITS: 5000 INJECTION INTRAVENOUS; SUBCUTANEOUS at 15:00

## 2024-12-27 RX ADMIN — LABETALOL HYDROCHLORIDE 20 MG: 5 INJECTION, SOLUTION INTRAVENOUS at 15:00

## 2024-12-27 RX ADMIN — LEVOTHYROXINE SODIUM 100 MCG: 0.1 TABLET ORAL at 06:50

## 2024-12-27 RX ADMIN — SODIUM CHLORIDE 8.5 MG/HR: 9 INJECTION, SOLUTION INTRAVENOUS at 12:57

## 2024-12-27 RX ADMIN — BUSPIRONE HYDROCHLORIDE 10 MG: 5 TABLET ORAL at 22:57

## 2024-12-27 RX ADMIN — FAMOTIDINE 10 MG: 10 INJECTION, SOLUTION INTRAVENOUS at 22:57

## 2024-12-27 RX ADMIN — HEPARIN SODIUM 5000 UNITS: 5000 INJECTION INTRAVENOUS; SUBCUTANEOUS at 22:56

## 2024-12-27 RX ADMIN — SODIUM CHLORIDE, PRESERVATIVE FREE 10 ML: 5 INJECTION INTRAVENOUS at 09:01

## 2024-12-27 RX ADMIN — SODIUM CHLORIDE, PRESERVATIVE FREE 10 ML: 5 INJECTION INTRAVENOUS at 23:02

## 2024-12-27 RX ADMIN — SODIUM CHLORIDE 8.5 MG/HR: 9 INJECTION, SOLUTION INTRAVENOUS at 07:11

## 2024-12-27 RX ADMIN — LABETALOL HYDROCHLORIDE 20 MG: 5 INJECTION, SOLUTION INTRAVENOUS at 10:50

## 2024-12-27 RX ADMIN — VALPROATE SODIUM 250 MG: 100 INJECTION, SOLUTION INTRAVENOUS at 05:05

## 2024-12-27 RX ADMIN — LABETALOL HYDROCHLORIDE 20 MG: 5 INJECTION, SOLUTION INTRAVENOUS at 20:59

## 2024-12-27 RX ADMIN — SODIUM CHLORIDE 8.5 MG/HR: 9 INJECTION, SOLUTION INTRAVENOUS at 09:13

## 2024-12-27 RX ADMIN — FAMOTIDINE 10 MG: 10 INJECTION, SOLUTION INTRAVENOUS at 08:59

## 2024-12-27 RX ADMIN — CALCIUM GLUCONATE 1000 MG: 20 INJECTION, SOLUTION INTRAVENOUS at 09:10

## 2024-12-27 RX ADMIN — ACETAMINOPHEN 650 MG: 325 TABLET ORAL at 11:02

## 2024-12-27 RX ADMIN — BUMETANIDE 2 MG: 0.25 INJECTION INTRAMUSCULAR; INTRAVENOUS at 17:47

## 2024-12-27 RX ADMIN — HEPARIN SODIUM 5000 UNITS: 5000 INJECTION INTRAVENOUS; SUBCUTANEOUS at 06:50

## 2024-12-27 RX ADMIN — SODIUM CHLORIDE 7.5 MG/HR: 9 INJECTION, SOLUTION INTRAVENOUS at 04:59

## 2024-12-27 RX ADMIN — ACETAMINOPHEN 650 MG: 325 TABLET ORAL at 03:17

## 2024-12-27 RX ADMIN — LOSARTAN POTASSIUM 100 MG: 50 TABLET, FILM COATED ORAL at 08:56

## 2024-12-27 RX ADMIN — BISACODYL 5 MG: 5 TABLET, COATED ORAL at 08:56

## 2024-12-27 ASSESSMENT — PAIN SCALES - GENERAL
PAINLEVEL_OUTOF10: 0
PAINLEVEL_OUTOF10: 5
PAINLEVEL_OUTOF10: 3
PAINLEVEL_OUTOF10: 0

## 2024-12-27 ASSESSMENT — PAIN DESCRIPTION - LOCATION
LOCATION: HEAD
LOCATION: ABDOMEN;HEAD
LOCATION: HEAD

## 2024-12-27 ASSESSMENT — PAIN DESCRIPTION - DESCRIPTORS
DESCRIPTORS: ACHING
DESCRIPTORS: ACHING

## 2024-12-27 ASSESSMENT — PAIN SCALES - WONG BAKER: WONGBAKER_NUMERICALRESPONSE: NO HURT

## 2024-12-27 NOTE — CARE COORDINATION
[facility] and was informed that patient should call 3-5 days prior to appointment to schedule transportation or that the patient's dialysis center can call and schedule routine and ongoing appointments based on dialysis days and seat times. CM gave the information given by [employer] and also offered information on alternative transport options to patient and mother.

## 2024-12-27 NOTE — FLOWSHEET NOTE
12/26/24 2340   Vital Signs   Pulse 94   Respirations 21   BP (!) 165/107   MAP (Calculated) 126     Patient on 7.5mg/hr Nicardipine drip.Sitter at bedside.

## 2024-12-27 NOTE — PROGRESS NOTES
Hospitalist Progress Note      Patient name: Sandrita Park.  MRN: [id number]          PCP: Jj Mo MD    Summary:           Sandrita Park is a 29 y.o.  female w/ PMHx of HTN, SLE w/ lupus nephritis & ESRD on HD T,T,S, bipolar disorder, seizure disorder, hypothyroidism, GERD and medical non-adherence to dialysis who presents after missing 1 week of dialysis confused and with SOB.  She presented to the ED with confusion, & underwent emergent HD with a plan for discharge from the ED after HD and resuming home meds, but unfortunately, BP still remained markedly elevated after HD and she was unable to take her PO meds due to confusion.  She was then admitted on a nicardipine drip with home meds resumed. Nephrology followed for hemodialysis.      Assessment/Plan:  Medical Complexity: High-1 acute medical problem with high risk threat, at least 3 follow up items    # Hypertensive emergency  # HTN, renovascular    - s/p emergent HD (UF -4.5L) on admission.  Admitted on nicardipine drip + labetalol prn, new losartan, home norvasc w/ goal to wean off drip     - appreciate nephrology following for HD plan for dialysis ip again on 12/ 27.     - started on clonidipine patch; amlodipine, prn labetalol.  goal to normalize BP to -140 / DBP 60-90.  Continue Drip today as BP ~160s on drip/meds and suspect it will take time to gradually improve (as missed 1 wk of meds/HD)      # Encephalopathy, Uremic suspected  - Improved after dialysis, but continues to remain confused.  CT head: negative.   BAL (-), Ammonia <10,  b12 ordered.   UA no infxn.    - f/up B12. TSH low, FT4 1.1.  FT3 elevated.    - SLP eval ordered- okay for Full liquids     # ESRD, on HD TTS (AVF)- missed HD session, presented w/ uremia Cr 18,  & confusion  # Medical non-adherence *  Pt states she takes meds 'sometimes'.  Often unable to eat as no appetite and then trouble taking meds.     - Nephrology following for HD    # Seizure disorder    -  mitral valve disease. Global longitudinal strain is reduced with a value of -14.8%.   Left Atrium: Left atrium is dilated. Left atrial volume index is mildly increased (35-41 mL/m2).      Mitral Valve: Severe annular calcification at the posterior leaflet.  No stenosis.   Tricuspid Valve :  Unable to assess RVSP due to inadequate or insignificant tricuspid regurgitation.   Pericardium: Trivial circumferential pericardial effusion present. No indication of cardiac tamponade.        Documentation Time:  >50 minutes were spent on the care of this patient today, including physician non-face-to-face service time visit on the date of service such as  Preparing to see the patient (eg, review of tests)  Obtaining and/or reviewing separately obtained history  Performing a medically necessary appropriate examination and/or evaluation  Counseling and educating the patient/family/caregiver  Ordering medications, tests, or procedures  Referring and communicating with other health care professionals as needed  Documenting clinical information in the electronic or other health record  Independently interpreting results (not reported separately) and communicating results to the patient/family/caregiver  Care coordination and discharge planning with Case Management.        Dear patient Sandrita Billingsy, if you are reviewing this note and have a question regarding the medical terminology, please bring it with you to your next PCP visit.  Medical notes are meant to be a communication between medical professionals.        Electronically signed by: [facility]  12/27/24

## 2024-12-27 NOTE — FLOWSHEET NOTE
1500: Arrived at pt's bedside, no s/s of distress noted, on RA. Pt's mother at bedside. Pt lethargic alert to self.   ICEBOAT and vitals completed. Machine safety checks completed and passed.     1510: Received SBAR report from primary nurse, Gianluca Crawford RN    AVF site clean and intact, no s/s of infection. Offered and placed mask on pt for infection prevention, pt's mother refused, reports pt does not tolerate it.      1523:  Accessed pt's AVF using aseptic technique, attempt successful without complications, blood return present and Initiated treatment per orders.  no s/s of distress noted.     1553: Pt's BP remains elevated, pt asymptomatic, Dr. Padilla notified about BP. Orders to administer PRN 10mg Labetalol IV and clonidine 0.1 mg, PO contraindicated as pt lethargic  and risk for aspiration, 10mg Labetalol IV was administered.       1921: Treatment completed 5 minutes early d/t patient c/o LE cramping. Initiated rinse back of 500ml NS. Attempted to disconnect lines and de-accessed per protocol,Pt continued to be disoriented despite multiple redirection and instructions to hold still for needle removal. Pt needle partially dislodged and notified staff for assistance with de-access. Hemostasis achieved 15 minutes to arterial site and 10 minutes to venous site. Gauze dressing and tape applied and remained dry and intact.   Vitals and post assessment obtained, primary nurse Leonel Moon RN, at bedside. Safety precautions maintained, rImperial locked  SBAR report given to primary nurse,  Leonel Moon RN,    Pt tolerated 4 hours of HD,  BP remained elevated and remained disoriented, MD Yepez, N. Aware, UF goal 4414 ml removed, NET of total 3914 ml fluid. BLP: 87.8 L      12/26/24 1930   Observations & Evaluations   Level of Consciousness 1   Oriented X 1   Heart Rhythm Regular   Respiratory Quality/Effort Unlabored   O2 Device Non-rebreather mask   Bilateral Breath Sounds Clear   Skin Color   (appropriate for  ethnicity)   Skin Condition/Temp Warm   Appetite Fair   Abdomen Inspection Rounded   RLE Edema +2   LLE Edema +2   Comments post tx assessment   Vital Signs   BP (!) 197/111   Temp 98.4 °F (36.9 °C)   Pulse 79   Respirations 18   Hemodialysis Fistula/Graft Arteriovenous fistula   Placement Date/Time: (c) 12/06/24 0710   Present on Admission/Arrival: Yes  Access Type: Arteriovenous fistula   Site Assessment Bleeding;Other (Comment)  (attempting to deaccess, pt altered and moving not following directions, venous needle coming out)   Thrill Present   Bruit Present   Status Deaccessed   Venous Needle Size 15 G   Arterial Needle Size 15 G   Dressing Intervention New;Dressing changed

## 2024-12-27 NOTE — PROGRESS NOTES
Patient:  Sandrita Park  :  1995  Gender:  female  MRN #:  [id number]    Assessment:      Sandrita Park is a 29 y.o. year old female  with SLE , lupus nephritis leading to ESRD now on HD , Hypertension, Anemia of CKD , secondary Hyperparathyroidism   CHF presented with confusion . She was admitted in this earlier this month for missing HD for 2 weeks , this time also she missed HD 1 week despite reminder from dialysis unit each session   I had explained the risk of missing dialysis many times which includes uremic encephalopathy , respiratory failure, stroke due to to uncontrolled hypertension , cardiac arrest and death , she is aware of all such risk   She is now uremic and has uncontrolled hypertension       ESRD  Volume overload/CHF   Hypertensive urgency   Anemia of CKD   Hyperphosphatemia   Uremia       Plan:   Patient examined and labs reviewed. She had 4 hours of dialysis yesterday and 4 liter ultrafiltration, volume status little better , she is awake and could recognize me but mental status is not at baseline/confused .   Will plan for dialysis today and again tomorrow for uremia   For now continue nicardipine drip and slowly wean of post HD if BP remained below 150 mmHg systolic and can take oral pills   Increase bumex to 2 mg BID IV   Resume all oral antihypertensive once mental status improves and wean off nicardipine drip , once taking oral increase lebatalol to 300 mg BID   Minimize volume infusion   Intake and output         Subjective/Interval Events    She recognized me but not oriented to time and place   Confused  BP still high   Breathing seems stable         Blood pressure (!) 160/113, pulse (!) 112, temperature 98.2 °F (36.8 °C), temperature source Oral, resp. rate 18, height 1.676 m (5' 5.98\"), weight 63.5 kg (140 lb), SpO2 99%.    Exam:    Pt confused, uremic     Lung: has crackles and wheeze  Heart: s1s2 regular    Ext: 1 + edema   Not oriented     [unfilled]   Recent Labs

## 2024-12-27 NOTE — DIALYSIS
Treatment summary  Received report from Kevin WINTER Rn    Patient is confuse wont keep steady constantly moving arms causing   machine alarm to go off. Does not follow direction.    Bp high 222/127/ p104, Dr. Martin notified,instructed  to request more   Med from hospitalist.     2038 -Labetalol given by primary nurse.     Dialyzed patient in  room 359  for 3.5 hours.    Total Uf 3850  ml  Net UF 3350ml     Treatment note:           Patient tolerate treatment well remain in stable condition.    Offered assistance with repositioning every 2 hours.      Vascular access visible at all times and line connected at all   times during treatment. Access LUE AVG Functioning well   De access without complications.       Report given to Ramin MOULTON RN with all questions answered.

## 2024-12-27 NOTE — PLAN OF CARE
Problem: SLP Adult - Impaired Swallowing  Goal: By Discharge: Advance to least restrictive diet without signs or symptoms of aspiration for planned discharge setting.  See evaluation for individualized goals.  Description: Patient will:  1. Tolerate PO trials with 0 s/s overt distress in 4/5 trials  2. Utilize compensatory swallow strategies/maneuvers (decrease bite/sip, size/rate, alt. liq/sol) with min cues in 4/5 trials    Rec:     Full liquid diet  Supervision w/ po to ensure adherence to safe swallow compensatory strategies   Aspiration precautions  HOB >45 during po intake, remain >30 for 30-45 minutes after po   Small bites/sips; alternate liquid/solid with slow feeding rate   Oral care TID  Meds per pt preference  Outcome: Progressing  SPEECH LANGUAGE PATHOLOGY BEDSIDE SWALLOW EVALUATION/TREATMENT    Patient: Sandrita Park (29 y.o. female)  Date: 12/27/2024  Primary Diagnosis: Metabolic encephalopathy [G93.41]  Hypertensive emergency [I16.1]  Acute metabolic encephalopathy [G93.41]       Precautions: Aspiration  PLOF: As per H&P  ASSESSMENT:  Based on the objective data described below, the pt presents with oropharyngeal swallow function that appears WFL. Pt seen today for a bedside swallow evaluation. Currently encephalopathic secondary to 1 week missed HD. PMHx of ESRD on HD, HTN, and GERD. Pt is a poor historian without family at the bedside. Endorses a regular solid diet and thin liquids at home. OME found to be unremarkable. Pt w/ some missing dentition and a clean oral cavity. Demo'd some difficulty following commands. Noted to have an intermittent congested cough at baseline.    1 x delayed congested following puree. No other overt s/sx aspiration observed w/ dx trials of ice chips, single/sequential sips of thin liquid via  teaspoon, cup sip, and straw, puree x5, and regular solids. Mastication and A-P transit were efficient w/ positive oral clearance across trials. Laryngeal elevation/excursion  monitoring;Patient/Family education;Therapeutic PO trials with SLP  Patient Education: Educated pt on aspiration, oral care, and safe swallow compensatory strategies  Patient Education Response: Needs reinforcement;No evidence of learning    Frequency/Duration: Patient will be followed by speech-language pathology 1-2 times per day/3-5 days per week to address goals.    Discharge Recommendations: D/C Recommendations: Ongoing speech therapy is recommended during this hospitalization     SUBJECTIVE:   Patient stated, \"this is [facility]\" following being informed 15 seconds earlier that [facility].    OBJECTIVE:     Past Medical History:   Diagnosis Date    A-V fistula (HCC)     LEFT SIDE     Anxiety and depression     Asthma     Bipolar 1 disorder (HCC)     Chronic pain     ESRD (end stage renal disease) on dialysis (HCC)     TTS    GERD (gastroesophageal reflux disease)     Hemodialysis patient (HCC)     History of blood transfusion 2017, 2020    Hypertension     Hypothyroidism     Lupus nephritis (HCC)     Medically noncompliant     Psychiatric disorder     Seizure disorder (HCC)     SLE (systemic lupus erythematosus) (HCC)      Past Surgical History:   Procedure Laterality Date    HEENT  2021    oral surgery    IR GUIDED TUNNELED INTRAPERITONEAL CATH W OR WO CONTRAST PERC      HI UNLISTED PROCEDURE ABDOMEN PERITONEUM & OMENTUM  2015    Drain a boil    UROLOGICAL SURGERY  20189, 2019    biopsies of kidneys    VASCULAR SURGERY Left 2020    dialysis shunt (arm)     Prior Level of Function/Home Situation:       Daily Assessment:  Baseline Assessment  Temperature Spikes Noted: No  Respiratory Status: Room air  O2 Device: None (Room air)  Communication Observation: Functional  Follows Directions: Simple  Current Diet : NPO  Current Liquid Diet : NPO  Dentition: Adequate, Some missing teeth  Patient Positioning: Upright in bed  Baseline Vocal Quality: Normal  Volitional Cough:

## 2024-12-27 NOTE — CONSULTS
Comprehensive High Risk Nutrition Assessment Initial    Type and Reason for Visit:  Initial, Consult  Nutrition for TF recs; appreciate consult  Nutrition Recommendations/Plan:   Agree with check pending SLP eval  If unable to take PO place NGT/verify start TF with Nepro 1.8 @ 10ml/hr adv 10 ml/hr Q6-8hrs to 50ml/hr goal rate as refugio provides 2160kcal, 97g pro, 872ml FW  Defer free water to MD ?need for Fluid Restriction  When able rec add Nephro-krista vs Zhane-krista daily either PO or via NGT  Monitor Phos, Ca, and rec to check PTH, monitor need for Calcitriol - defer to MD  Elevated Phos 7.2 suggest add CaCO3 will also help with low Ca 8.3 - defer to MD  Re-check weight since dialysis cont to trend ?EDW  Cont to monitor wt trends (diuresing), lytes, UOP, bowel fx, wound healing and adjust recs as needed     Malnutrition Assessment:  Malnutrition Status:  At risk for malnutrition (12/27/24 1046)      Nutrition Assessment:    28yo F admitted AMS after missing 1 week of dialysis confused with SOB, Encephalopathy, HTN, versus uremic Improved after dialysis not near baseline, CT head neg, ESRD TTS, seizure d/o, hypertensive emergency  HTN; pmhx HTN, ESRD, ronald nephritis, hypothyroidism, GERD and non-adherence to dialysis per MD.  NPO. confused. pending SLP bong. nutrition consulted for TF recs.  wt appears stable sicne 64kg 6/14/24 if admit wt correct however fluid maybe masking wt loss. ?EDW.  noted dialysis yesterday -3.9L removal. diuresing bumex. cardene gtt.    Nutrition Related Findings:    K 4.1, Phos 7.2, Ca 8.3; dulcolax, pepcid, bumex, s/p ca gluconate, cardene gtt Wound Type: None       Current Nutrition Intake & Therapies:    Average Meal Intake: NPO  Average Supplements Intake: NPO  Diet NPO    Anthropometric Measures:  Height: 167.6 cm (5' 5.98\")  Ideal Body Weight (IBW): 130 lbs (59 kg)    Admission Body Weight: 64 kg (141 lb 1.5 oz)  Current Body Weight: 64 kg (141 lb 1.5 oz), 108.5 % IBW.    Current BMI

## 2024-12-27 NOTE — FLOWSHEET NOTE
12/26/24 2105   Vital Signs   Pulse (!) 102   Respirations 23   BP (!) 145/108   MAP (Calculated) 120     Patient on nicardipine fixed rate at 7.5 mg/hr per MD.  Patient is restless but easily reoriented.

## 2024-12-27 NOTE — PROGRESS NOTES
TRANSFER - IN REPORT:    Verbal report received from Nancy GANDARA  on Sandrita Park  being received from ED for routine progression of patient care      Report consisted of patient's Situation, Background, Assessment and   Recommendations(SBAR).     Information from the following report(s) Nurse Handoff Report, ED Encounter Summary, ED SBAR, Adult Overview, Intake/Output, MAR, Recent Results, Cardiac Rhythm was reviewed with the receiving nurse.      Cardene drip 7.5 mg  handoff with  ED nurse.     Opportunity for questions and clarification was provided.      Patient received  via stretcher Assessment completed upon patient's arrival to unit and care assumed.

## 2024-12-27 NOTE — ED PROVIDER NOTES
EMERGENCY DEPARTMENT CONTINUING CARE NOTE      THIS NOTE IS DOCUMENTATION OF CARE PROVIDED AFTER CARE OF THE PATIENT WAS TRANSFERRED TO ME. PLEASE SEE THE NOTE BY THE INITIAL ED PROVIDER FOR DETAILS SURROUNDING THE H&P AND INITIAL MEDICAL DECISION MAKING.    Patient Name: Sandrita Park  MRN: [id number]  YOB: 1995  Provider: Delbert Aguilar MD  PCP: Jj Mo MD   Date of transfer of care: 12/26/24  ED Bed: 9    Diagnostic Study Results     LABS:  Recent Results (from the past 12 hour(s))   Comprehensive Metabolic Panel    Collection Time: 12/26/24  7:12 PM   Result Value Ref Range    Sodium 140 136 - 145 mmol/L    Potassium 3.7 3.5 - 5.5 mmol/L    Chloride 105 100 - 111 mmol/L    CO2 27 21 - 32 mmol/L    Anion Gap 8 3.0 - 18 mmol/L    Glucose 83 74 - 99 mg/dL    BUN 28 (H) 7.0 - 18 MG/DL    Creatinine 6.50 (H) 0.6 - 1.3 MG/DL    BUN/Creatinine Ratio 4 (L) 12 - 20      Est, Glom Filt Rate 8 (L) >60 ml/min/1.73m2    Calcium 9.8 8.5 - 10.1 MG/DL    Total Bilirubin 0.5 0.2 - 1.0 MG/DL    ALT 11 (L) 13 - 56 U/L    AST 5 (L) 10 - 38 U/L    Alk Phosphatase 67 45 - 117 U/L    Total Protein 7.3 6.4 - 8.2 g/dL    Albumin 3.1 (L) 3.4 - 5.0 g/dL    Globulin 4.2 (H) 2.0 - 4.0 g/dL    Albumin/Globulin Ratio 0.7 (L) 0.8 - 1.7     TSH    Collection Time: 12/26/24  7:12 PM   Result Value Ref Range    TSH, 3rd Generation 8.12 (H) 0.36 - 3.74 uIU/mL   Renal Function Panel    Collection Time: 12/27/24  4:16 AM   Result Value Ref Range    Sodium 139 136 - 145 mmol/L    Potassium 4.1 3.5 - 5.5 mmol/L    Chloride 107 100 - 111 mmol/L    CO2 24 21 - 32 mmol/L    Anion Gap 8 3.0 - 18 mmol/L    Glucose 92 74 - 99 mg/dL    BUN 37 (H) 7.0 - 18 MG/DL    Creatinine 9.29 (H) 0.6 - 1.3 MG/DL    BUN/Creatinine Ratio 4 (L) 12 - 20      Est, Glom Filt Rate 5 (L) >60 ml/min/1.73m2    Calcium 8.3 (L) 8.5 - 10.1 MG/DL    Phosphorus 7.2 (H) 2.5 - 4.9 MG/DL    Albumin 2.6 (L) 3.4 - 5.0 g/dL       RADIOLOGIC STUDIES:   Non x-ray images  such as CT, Ultrasound and MRI are read by the radiologist. X-ray images are visualized and preliminarily interpreted by the ED Provider with the findings as listed in the ED Course section below.     Interpretation per the Radiologist is listed below, if available at the time of this note:    XR CHEST PORTABLE   Final Result   No acute cardiopulmonary disease.         Electronically signed by Preston Jimenez MD      CT HEAD WO CONTRAST   Final Result   No acute intracranial hemorrhage, mass or infarct.             Electronically signed by Preston Jimenez MD          ED Course     TRANSFER OF CARE:  8:51 PM EST  Patient care will be transferred from Sagar Yepez MD to Delbert Aguilar MD. Discussed available diagnostic results and care plan at length at beside. Patient and family made aware of provider change. All questions answered. Patient and family voiced understanding.    MEDICATIONS ADMINISTERED IN THE ED:  Medications   labetalol (NORMODYNE;TRANDATE) injection 10 mg ( IntraVENous Held by provider 12/26/24 2131)   losartan (COZAAR) tablet 100 mg (0 mg Oral Held 12/26/24 1111)   amLODIPine (NORVASC) tablet 10 mg ( Oral Automatically Held 1/1/25 0900)   labetalol (NORMODYNE) tablet 100 mg ( Oral Automatically Held 1/1/25 2100)   sodium chloride flush 0.9 % injection 5-40 mL (10 mLs IntraVENous Given 12/26/24 2328)   sodium chloride flush 0.9 % injection 5-40 mL (has no administration in time range)   0.9 % sodium chloride infusion (has no administration in time range)   heparin (porcine) injection 5,000 Units (has no administration in time range)   acetaminophen (TYLENOL) tablet 650 mg (650 mg Oral Given 12/27/24 0317)     Or   acetaminophen (TYLENOL) suppository 650 mg ( Rectal See Alternative 12/27/24 0317)   bisacodyl (DULCOLAX) EC tablet 5 mg (has no administration in time range)   senna (SENOKOT) 8.8 MG/5ML syrup 8.8 mg (has no administration in time range)   polyethylene glycol (GLYCOLAX) packet 17 g (has no

## 2024-12-27 NOTE — FLOWSHEET NOTE
12/26/24 1930   Vital Signs   Pulse 79   Respirations 18   BP (!) 197/111   MAP (Calculated) 140     Cardene drip stated.Patient has completed her dialysis. Per dialysis nurse Appropriately 4L drained.

## 2024-12-27 NOTE — PLAN OF CARE
Problem: Discharge Planning  Goal: Discharge to home or other facility with appropriate resources  12/27/2024 1014 by Dari Brown, RN  Outcome: Progressing     Problem: Pain  Goal: Verbalizes/displays adequate comfort level or baseline comfort level  12/27/2024 1014 by Dari Brown RN  Outcome: Progressing     Problem: Skin/Tissue Integrity  Goal: Absence of new skin breakdown  Description: 1.  Monitor for areas of redness and/or skin breakdown  2.  Assess vascular access sites hourly  3.  Every 4-6 hours minimum:  Change oxygen saturation probe site  4.  Every 4-6 hours:  If on nasal continuous positive airway pressure, respiratory therapy assess nares and determine need for appliance change or resting period.  12/27/2024 1014 by Dari Brown RN  Outcome: Progressing     Problem: Safety - Adult  Goal: Free from fall injury  Outcome: Progressing     Problem: Neurosensory - Adult  Goal: Achieves stable or improved neurological status  Outcome: Progressing     Problem: Neurosensory - Adult  Goal: Absence of seizures  Outcome: Progressing     Problem: Neurosensory - Adult  Goal: Remains free of injury related to seizures activity  Outcome: Progressing     Problem: Neurosensory - Adult  Goal: Achieves maximal functionality and self care  Outcome: Progressing     Problem: Musculoskeletal - Adult  Goal: Return ADL status to a safe level of function  Outcome: Progressing     Problem: Infection - Adult  Goal: Absence of infection at discharge  Outcome: Progressing     Problem: Infection - Adult  Goal: Absence of infection during hospitalization  Outcome: Progressing     Problem: Infection - Adult  Goal: Absence of fever/infection during anticipated neutropenic period  Outcome: Progressing     Problem: Metabolic/Fluid and Electrolytes - Adult  Goal: Electrolytes maintained within normal limits  Outcome: Progressing     Problem: Metabolic/Fluid and Electrolytes - Adult  Goal: Hemodynamic stability and  optimal renal function maintained  Outcome: Progressing     Problem: Metabolic/Fluid and Electrolytes - Adult  Goal: Glucose maintained within prescribed range  Outcome: Progressing     Problem: Hematologic - Adult  Goal: Maintains hematologic stability  Outcome: Progressing     Will continue to monitor and answer questions as needed.  Dari Brown RN

## 2024-12-27 NOTE — H&P
History & Physical    Patient: Sandrita Park MRN: [id number]  CSN: [id number]    YOB: 1995  Age: 29 y.o.  Sex: female      DOA: 12/26/2024    Chief Complaint:   Chief Complaint   Patient presents with    Altered Mental Status       Active Hospital Problems    Diagnosis Date Noted    Hypertensive emergency [I16.1] 12/26/2024     Priority: High    Encounter for renal dialysis, emergent [Z99.2] 12/26/2024     Priority: High    Acute metabolic encephalopathy [G93.41] 12/06/2024     Priority: High    Fluid overload, acute pulm edema and encephalopathy [E87.70] 12/06/2024     Priority: High    Bipolar 1 disorder (HCC) [F31.9] 12/07/2024    Nonischemic cardiomyopathy (HCC) [I42.8] 12/07/2024    Non-adherence to medical treatment, missed HD sessions [Z91.199] 12/06/2024    ESRD on dialysis (HCC) [N18.6, Z99.2]     GERD (gastroesophageal reflux disease) [K21.9]     Hypertension [I10]     Hypothyroidism [E03.9]     Systemic lupus erythematosus with glomerular disease (HCC) [M32.14]        HPI:     Sandrita Park is a 29 y.o.  female w/ PMH of HTN, ESRD, ronald nephritis, hypothyroidism, GERD and non-adherence to dialysis who presents after missing 1 week of dialysis confused and with SOB.    Ms. Park presented with confusion, she was sent to emergent HD from the ED with a plan for being discharged from ED after HD and resuming home meds but unfortunately her BP was still markedly elevated after HD and was unable to take her PO meds because she was so confused.      Medicine was asked to admit this patient with resolving HTN-emergency and encephalopathy on a nicardipine drip.  She was recently admitted and discharged with HTN-emergency.      Social Hx: unable to determine d/t AMS. Lives with mother who brought pt to hospital. Records review indicates pt is current smoker,     FHx:  unable to determine d/t AMS.     In the ED: Paternal, ronald; Maternal, unk. (Records)    PCP: Jj Mo MD      Past Medical  History:   Diagnosis Date    A-V fistula (HCC)     LEFT SIDE     Anxiety and depression     Asthma     Chronic kidney disease     ESRD- fresinius    Chronic pain     GERD (gastroesophageal reflux disease)     History of blood transfusion 2020    Hypertension     Lupus     Psychiatric disorder     Thyroid disease     hypo       Past Surgical History:   Procedure Laterality Date    HEENT      oral surgery    IR GUIDED TUNNELED INTRAPERITONEAL CATH W OR WO CONTRAST PERC      NM UNLISTED PROCEDURE ABDOMEN PERITONEUM & OMENTUM      Drain a boil    UROLOGICAL SURGERY  2019    biopsies of kidneys    VASCULAR SURGERY Left 2020    dialysis shunt (arm)       Family History   Problem Relation Age of Onset    Lupus Father     Kidney Disease Father        Social History     Socioeconomic History    Marital status: Single   Tobacco Use    Smoking status: Every Day    Smokeless tobacco: Never    Tobacco comments:     Quit smokin Black and milds daily d/t recent stress   Substance and Sexual Activity    Alcohol use: Not Currently    Drug use: Yes     Types: Marijuana (Weed)     Social Determinants of Health     Financial Resource Strain: High Risk (3/1/2023)    Received from [facility]    Overall Financial Resource Strain (CARDIA)     Difficulty of Paying Living Expenses: Very hard   Food Insecurity: Food Insecurity Present (2024)    Hunger Vital Sign     Worried About Running Out of Food in the Last Year: Sometimes true     Ran Out of Food in the Last Year: Sometimes true   Transportation Needs: Unmet Transportation Needs (2024)    PRAPARE - Transportation     Lack of Transportation (Medical): Yes     Lack of Transportation (Non-Medical): Yes   Physical Activity: Insufficiently Active (3/1/2023)    Received from [employer]    Exercise Vital Sign     Days of Exercise per Week: 1 day     Minutes of Exercise per Session: 10 min   Stress: Stress Concern Present

## 2024-12-27 NOTE — CARE COORDINATION
12/27/24 4156   Service Assessment   Patient Orientation Alert and Oriented;Person;Place;Situation;Self   Cognition Other (see comment)  (Intermittent confusion)   History Provided By Patient;Other (see comment)  (Additional info provided by mother)   Primary Caregiver Self   Accompanied By/Relationship Patient's mother in the room during interview   Support Systems Parent   Patient's Healthcare Decision Maker is: Legal Next of Kin   PCP Verified by CM Yes  (Dr. Leola Muhammad)   Last Visit to PCP Within last 3 months   Prior Functional Level Independent in ADLs/IADLs   Current Functional Level Independent in ADLs/IADLs   Can patient return to prior living arrangement Yes   Ability to make needs known: Fair   Family able to assist with home care needs: Yes   Would you like for me to discuss the discharge plan with any other family members/significant others, and if so, who? Yes  (Mother)   Financial Resources Medicare   Community Resources Transportation  (Utilizes Medicaid transport)   Social/Functional History   Lives With Alone   Type of Home House   Receives Help From Family   Prior Level of Assist for ADLs Independent   Prior Level of Assist for Homemaking Independent   Homemaking Responsibilities Yes   Ambulation Assistance Independent   Prior Level of Assist for Transfers Independent   Active  No   Patient's  Info Medicaid transport   Mode of Transportation   (Medicaid transport)   Occupation On disability   Discharge Planning   Type of Residence House   Living Arrangements Alone   Current Services Prior To Admission None   Potential Assistance Needed N/A   DME Ordered? No   Potential Assistance Purchasing Medications No   Type of Home Care Services None   Patient expects to be discharged to: House   Follow Up Appointment: Best Day/Time  Monday AM   One/Two Story Residence One story   History of falls? 0   Services At/After Discharge   Transition of Care Consult (CM Consult) N/A   Services  At/After Discharge None    Resource Information Provided? No   Mode of Transport at Discharge Other (see comment)  (Medicaid transport)   Confirm Follow Up Transport Self   Condition of Participation: Discharge Planning   The Plan for Transition of Care is related to the following treatment goals: Discharge home with family support, dialysis, and physician follow-up   The Patient and/or Patient Representative was provided with a Choice of Provider? Patient   The Patient and/Or Patient Representative agree with the Discharge Plan? Yes   Freedom of Choice list was provided with basic dialogue that supports the patient's individualized plan of care/goals, treatment preferences, and shares the quality data associated with the providers?  Yes     CM spoke to patient and patient's mother is also at bedside. Reports that patient is back in the hospital due to missed dialysis due to transportation issues. Patient does not drive and mother states that she does not have a car, so the patient relies solely on Medicaid transport to get to dialysis. Patient's mother states that they had appointment with Medicaid transport for dialysis but they did not show up. Patient does not drive and patient's mother does not have a car. Current plan is to discharge home with family support, dialysis, and physician follow-up.

## 2024-12-27 NOTE — CARE COORDINATION
Care Mgmt visited patient in room to review/discuss OBS Status.  Patient seemed confused, not feeling well enough to sign.  Copy of letter left at bedside with my contact number [phone] for questions/concerns.  Original letter placed in patient's chart.

## 2024-12-27 NOTE — CARE COORDINATION
12/27/24 1415   Readmission Assessment   Number of Days since last admission? 8-30 days   Previous Disposition Other (comment)  (Home alone with family support)   Who is being Interviewed Patient  (Mother also at bedside adn contributed to interview)   What was the patient's/caregiver's perception as to why they think they needed to return back to the hospital? Other (Comment)  (Unable to get to dialysis due to issues with transportation)   Did you see a specialist, such as Cardiac, Pulmonary, Orthopedic Physician, etc. after you left the hospital? No   Who advised the patient to return to the hospital? Self-referral   Does the patient report anything that got in the way of taking their medications? No   In our efforts to provide the best possible care to you and others like you, can you think of anything that we could have done to help you after you left the hospital the first time, so that you might not have needed to return so soon? Additional Community resources available for illness support     CM spoke to patient in room and mother at bedside also contributed during the interview. Patient reports that she came back to the hospital due to missed dialysis due to issues with Medicaid transportation.

## 2024-12-27 NOTE — ED NOTES
Unable to locate clonidine patch on patient. Attempted to call mother for more information about if pt uses patch, unable to reach. Pt is confused.

## 2024-12-27 NOTE — PLAN OF CARE
Problem: Discharge Planning  Goal: Discharge to home or other facility with appropriate resources  Outcome: Progressing  Flowsheets (Taken 12/27/2024 0130)  Discharge to home or other facility with appropriate resources:   Identify barriers to discharge with patient and caregiver   Arrange for needed discharge resources and transportation as appropriate   Identify discharge learning needs (meds, wound care, etc)     Problem: Pain  Goal: Verbalizes/displays adequate comfort level or baseline comfort level  Outcome: Progressing  Flowsheets (Taken 12/27/2024 0130)  Verbalizes/displays adequate comfort level or baseline comfort level:   Encourage patient to monitor pain and request assistance   Assess pain using appropriate pain scale   Administer analgesics based on type and severity of pain and evaluate response     Problem: Skin/Tissue Integrity  Goal: Absence of new skin breakdown  Description: 1.  Monitor for areas of redness and/or skin breakdown  2.  Assess vascular access sites hourly  3.  Every 4-6 hours minimum:  Change oxygen saturation probe site  4.  Every 4-6 hours:  If on nasal continuous positive airway pressure, respiratory therapy assess nares and determine need for appliance change or resting period.  Outcome: Progressing

## 2024-12-28 VITALS
TEMPERATURE: 98.2 F | HEIGHT: 66 IN | OXYGEN SATURATION: 100 % | RESPIRATION RATE: 22 BRPM | SYSTOLIC BLOOD PRESSURE: 151 MMHG | WEIGHT: 140 LBS | BODY MASS INDEX: 22.5 KG/M2 | HEART RATE: 89 BPM | DIASTOLIC BLOOD PRESSURE: 72 MMHG

## 2024-12-28 LAB
ALBUMIN SERPL-MCNC: 3.3 G/DL (ref 3.4–5)
ANION GAP SERPL CALC-SCNC: 7 MMOL/L (ref 3–18)
BUN SERPL-MCNC: 17 MG/DL (ref 7–18)
BUN/CREAT SERPL: 3 (ref 12–20)
CA-I SERPL-SCNC: 1 MMOL/L (ref 1.12–1.32)
CALCIUM SERPL-MCNC: 8.4 MG/DL (ref 8.5–10.1)
CHLORIDE SERPL-SCNC: 101 MMOL/L (ref 100–111)
CO2 SERPL-SCNC: 28 MMOL/L (ref 21–32)
CREAT SERPL-MCNC: 5.9 MG/DL (ref 0.6–1.3)
ERYTHROCYTE [DISTWIDTH] IN BLOOD BY AUTOMATED COUNT: 20.4 % (ref 11.6–14.5)
GLUCOSE BLD STRIP.AUTO-MCNC: 91 MG/DL (ref 70–110)
GLUCOSE BLD STRIP.AUTO-MCNC: 93 MG/DL (ref 70–110)
GLUCOSE SERPL-MCNC: 88 MG/DL (ref 74–99)
HCT VFR BLD AUTO: 35.9 % (ref 35–45)
HGB BLD-MCNC: 11.3 G/DL (ref 12–16)
MAGNESIUM SERPL-MCNC: 2 MG/DL (ref 1.6–2.6)
MCH RBC QN AUTO: 27.2 PG (ref 24–34)
MCHC RBC AUTO-ENTMCNC: 31.5 G/DL (ref 31–37)
MCV RBC AUTO: 86.3 FL (ref 78–100)
NRBC # BLD: 0 K/UL (ref 0–0.01)
NRBC BLD-RTO: 0 PER 100 WBC
PHOSPHATE SERPL-MCNC: 4.9 MG/DL (ref 2.5–4.9)
PLATELET # BLD AUTO: 115 K/UL (ref 135–420)
POTASSIUM SERPL-SCNC: 3.6 MMOL/L (ref 3.5–5.5)
RBC # BLD AUTO: 4.16 M/UL (ref 4.2–5.3)
SODIUM SERPL-SCNC: 136 MMOL/L (ref 136–145)
VIT B12 SERPL-MCNC: 364 PG/ML (ref 232–1245)
WBC # BLD AUTO: 4.8 K/UL (ref 4.6–13.2)

## 2024-12-28 PROCEDURE — G0378 HOSPITAL OBSERVATION PER HR: HCPCS

## 2024-12-28 PROCEDURE — 82533 TOTAL CORTISOL: CPT

## 2024-12-28 PROCEDURE — 83735 ASSAY OF MAGNESIUM: CPT

## 2024-12-28 PROCEDURE — 1100000000 HC RM PRIVATE

## 2024-12-28 PROCEDURE — 2580000003 HC RX 258: Performed by: INTERNAL MEDICINE

## 2024-12-28 PROCEDURE — 96376 TX/PRO/DX INJ SAME DRUG ADON: CPT

## 2024-12-28 PROCEDURE — 6370000000 HC RX 637 (ALT 250 FOR IP): Performed by: INTERNAL MEDICINE

## 2024-12-28 PROCEDURE — 96372 THER/PROPH/DIAG INJ SC/IM: CPT

## 2024-12-28 PROCEDURE — 80069 RENAL FUNCTION PANEL: CPT

## 2024-12-28 PROCEDURE — 6360000002 HC RX W HCPCS: Performed by: STUDENT IN AN ORGANIZED HEALTH CARE EDUCATION/TRAINING PROGRAM

## 2024-12-28 PROCEDURE — 85027 COMPLETE CBC AUTOMATED: CPT

## 2024-12-28 PROCEDURE — 6360000002 HC RX W HCPCS: Performed by: INTERNAL MEDICINE

## 2024-12-28 PROCEDURE — 2500000003 HC RX 250 WO HCPCS: Performed by: INTERNAL MEDICINE

## 2024-12-28 PROCEDURE — 82962 GLUCOSE BLOOD TEST: CPT

## 2024-12-28 PROCEDURE — 96366 THER/PROPH/DIAG IV INF ADDON: CPT

## 2024-12-28 PROCEDURE — 82330 ASSAY OF CALCIUM: CPT

## 2024-12-28 PROCEDURE — 2580000003 HC RX 258: Performed by: HOSPITALIST

## 2024-12-28 PROCEDURE — 96368 THER/DIAG CONCURRENT INF: CPT

## 2024-12-28 PROCEDURE — 6360000002 HC RX W HCPCS: Performed by: HOSPITALIST

## 2024-12-28 PROCEDURE — 36415 COLL VENOUS BLD VENIPUNCTURE: CPT

## 2024-12-28 PROCEDURE — 6370000000 HC RX 637 (ALT 250 FOR IP): Performed by: HOSPITALIST

## 2024-12-28 PROCEDURE — 94640 AIRWAY INHALATION TREATMENT: CPT

## 2024-12-28 PROCEDURE — 90935 HEMODIALYSIS ONE EVALUATION: CPT

## 2024-12-28 RX ORDER — CLONIDINE HYDROCHLORIDE 0.1 MG/1
0.2 TABLET ORAL 2 TIMES DAILY
Status: DISCONTINUED | OUTPATIENT
Start: 2024-12-28 | End: 2024-12-28 | Stop reason: HOSPADM

## 2024-12-28 RX ORDER — MINOXIDIL 2.5 MG/1
5 TABLET ORAL 2 TIMES DAILY
Status: DISCONTINUED | OUTPATIENT
Start: 2024-12-28 | End: 2024-12-28 | Stop reason: HOSPADM

## 2024-12-28 RX ORDER — HYDROMORPHONE HYDROCHLORIDE 1 MG/ML
0.5 INJECTION, SOLUTION INTRAMUSCULAR; INTRAVENOUS; SUBCUTANEOUS EVERY 4 HOURS PRN
Status: DISCONTINUED | OUTPATIENT
Start: 2024-12-28 | End: 2024-12-28 | Stop reason: HOSPADM

## 2024-12-28 RX ORDER — HYDROMORPHONE HYDROCHLORIDE 2 MG/1
1 TABLET ORAL ONCE
Status: COMPLETED | OUTPATIENT
Start: 2024-12-28 | End: 2024-12-28

## 2024-12-28 RX ORDER — MINOXIDIL 2.5 MG/1
2.5 TABLET ORAL DAILY
Status: DISCONTINUED | OUTPATIENT
Start: 2024-12-28 | End: 2024-12-28

## 2024-12-28 RX ORDER — BUMETANIDE 2 MG/1
2 TABLET ORAL 2 TIMES DAILY
Qty: 60 TABLET | Refills: 1 | Status: SHIPPED | OUTPATIENT
Start: 2024-12-28

## 2024-12-28 RX ORDER — CARVEDILOL 25 MG/1
25 TABLET ORAL 2 TIMES DAILY WITH MEALS
Qty: 60 TABLET | Refills: 1 | Status: SHIPPED | OUTPATIENT
Start: 2024-12-28

## 2024-12-28 RX ORDER — AMLODIPINE BESYLATE 10 MG/1
10 TABLET ORAL DAILY
Qty: 30 TABLET | Refills: 1 | Status: SHIPPED | OUTPATIENT
Start: 2024-12-28

## 2024-12-28 RX ORDER — MINOXIDIL 2.5 MG/1
5 TABLET ORAL DAILY
Qty: 30 TABLET | Refills: 3 | Status: SHIPPED | OUTPATIENT
Start: 2024-12-28

## 2024-12-28 RX ORDER — CARVEDILOL 25 MG/1
25 TABLET ORAL 2 TIMES DAILY WITH MEALS
Status: ON HOLD | COMMUNITY
End: 2024-12-28

## 2024-12-28 RX ORDER — BUMETANIDE 2 MG/1
2 TABLET ORAL DAILY
Qty: 30 TABLET | Refills: 1 | Status: SHIPPED | OUTPATIENT
Start: 2024-12-28 | End: 2024-12-28

## 2024-12-28 RX ORDER — MINOXIDIL 2.5 MG/1
5 TABLET ORAL DAILY
Status: ON HOLD | COMMUNITY
Start: 2024-02-26 | End: 2024-12-28

## 2024-12-28 RX ORDER — CLONIDINE HYDROCHLORIDE 0.2 MG/1
0.2 TABLET ORAL 2 TIMES DAILY
Qty: 60 TABLET | Refills: 1 | Status: SHIPPED | OUTPATIENT
Start: 2024-12-28 | End: 2024-12-28

## 2024-12-28 RX ORDER — LOSARTAN POTASSIUM 100 MG/1
100 TABLET ORAL DAILY
Qty: 30 TABLET | Refills: 1 | Status: SHIPPED | OUTPATIENT
Start: 2024-12-29

## 2024-12-28 RX ORDER — CLONIDINE HYDROCHLORIDE 0.2 MG/1
0.2 TABLET ORAL 2 TIMES DAILY
Qty: 60 TABLET | Refills: 1 | Status: SHIPPED | OUTPATIENT
Start: 2024-12-28

## 2024-12-28 RX ADMIN — LOSARTAN POTASSIUM 100 MG: 50 TABLET, FILM COATED ORAL at 09:23

## 2024-12-28 RX ADMIN — ACETAMINOPHEN 650 MG: 325 TABLET ORAL at 01:11

## 2024-12-28 RX ADMIN — SODIUM CHLORIDE 8 MG/HR: 9 INJECTION, SOLUTION INTRAVENOUS at 07:59

## 2024-12-28 RX ADMIN — VALPROATE SODIUM 250 MG: 100 INJECTION, SOLUTION INTRAVENOUS at 05:29

## 2024-12-28 RX ADMIN — BISACODYL 5 MG: 5 TABLET, COATED ORAL at 09:23

## 2024-12-28 RX ADMIN — HEPARIN SODIUM 5000 UNITS: 5000 INJECTION INTRAVENOUS; SUBCUTANEOUS at 05:23

## 2024-12-28 RX ADMIN — LEVOTHYROXINE SODIUM 100 MCG: 0.1 TABLET ORAL at 05:22

## 2024-12-28 RX ADMIN — SODIUM CHLORIDE, PRESERVATIVE FREE 10 ML: 5 INJECTION INTRAVENOUS at 09:25

## 2024-12-28 RX ADMIN — BUSPIRONE HYDROCHLORIDE 10 MG: 5 TABLET ORAL at 13:10

## 2024-12-28 RX ADMIN — AMLODIPINE BESYLATE 10 MG: 5 TABLET ORAL at 09:23

## 2024-12-28 RX ADMIN — LABETALOL HYDROCHLORIDE 20 MG: 5 INJECTION, SOLUTION INTRAVENOUS at 01:12

## 2024-12-28 RX ADMIN — VALPROATE SODIUM 250 MG: 100 INJECTION, SOLUTION INTRAVENOUS at 11:03

## 2024-12-28 RX ADMIN — FAMOTIDINE 10 MG: 10 INJECTION, SOLUTION INTRAVENOUS at 09:24

## 2024-12-28 RX ADMIN — CLONIDINE HYDROCHLORIDE 0.2 MG: 0.1 TABLET ORAL at 12:50

## 2024-12-28 RX ADMIN — LABETALOL HYDROCHLORIDE 20 MG: 5 INJECTION, SOLUTION INTRAVENOUS at 05:31

## 2024-12-28 RX ADMIN — LABETALOL HYDROCHLORIDE 20 MG: 5 INJECTION, SOLUTION INTRAVENOUS at 09:25

## 2024-12-28 RX ADMIN — HYDROMORPHONE HYDROCHLORIDE 1 MG: 2 TABLET ORAL at 14:07

## 2024-12-28 RX ADMIN — ACETAMINOPHEN 650 MG: 325 TABLET ORAL at 11:07

## 2024-12-28 RX ADMIN — HEPARIN SODIUM 5000 UNITS: 5000 INJECTION INTRAVENOUS; SUBCUTANEOUS at 13:10

## 2024-12-28 RX ADMIN — BUMETANIDE 2 MG: 0.25 INJECTION INTRAMUSCULAR; INTRAVENOUS at 09:24

## 2024-12-28 RX ADMIN — ARFORMOTEROL TARTRATE: 15 SOLUTION RESPIRATORY (INHALATION) at 19:40

## 2024-12-28 RX ADMIN — BUSPIRONE HYDROCHLORIDE 10 MG: 5 TABLET ORAL at 09:23

## 2024-12-28 RX ADMIN — ARFORMOTEROL TARTRATE: 15 SOLUTION RESPIRATORY (INHALATION) at 07:46

## 2024-12-28 ASSESSMENT — PAIN DESCRIPTION - DESCRIPTORS
DESCRIPTORS: ACHING
DESCRIPTORS: ACHING;SORE

## 2024-12-28 ASSESSMENT — PAIN - FUNCTIONAL ASSESSMENT: PAIN_FUNCTIONAL_ASSESSMENT: ACTIVITIES ARE NOT PREVENTED

## 2024-12-28 ASSESSMENT — PAIN DESCRIPTION - LOCATION
LOCATION: HEAD
LOCATION: GENERALIZED

## 2024-12-28 ASSESSMENT — PAIN SCALES - GENERAL
PAINLEVEL_OUTOF10: 8
PAINLEVEL_OUTOF10: 0
PAINLEVEL_OUTOF10: 4
PAINLEVEL_OUTOF10: 2
PAINLEVEL_OUTOF10: 4

## 2024-12-28 ASSESSMENT — PAIN DESCRIPTION - PAIN TYPE: TYPE: CHRONIC PAIN;DEEP SOMATIC PAIN

## 2024-12-28 ASSESSMENT — PAIN SCALES - WONG BAKER: WONGBAKER_NUMERICALRESPONSE: HURTS A LITTLE BIT

## 2024-12-28 ASSESSMENT — PAIN DESCRIPTION - ONSET: ONSET: PROGRESSIVE

## 2024-12-28 ASSESSMENT — PAIN DESCRIPTION - FREQUENCY: FREQUENCY: INTERMITTENT

## 2024-12-28 NOTE — PROGRESS NOTES
Arrived to room. Alert and a&ox4.Accessed left AVF with no issues. Slight swelling to site, pt says this is due to recent cannulation.AVF positive for strong bruit/thrill. HD started, pt constantly moving left arm setting off HD machine alarms, redirected a few times.     1800: Pt c/o cramping to legs, grimacing in pain, slapping legs. UF goal decreased, BFR decreased. Gave mustard packets to pt, she says it helped a little but c/o 10/10 pain, pt jumping to side of bed begging for me to end HD.     1811:UF goal decreased again and BFR decreased but pt still yelling out in pain while thrashing all over bed and putting leg up into the air, squeezing leg tightly. HD suspended with 1 hr remaining.       HD tolerated poorly. 2171 ML removed. Needles removed and pressure held until hemostasis achieved. Pt now talking to this nurse, pleasant. Pt left in stable condition, on phone sitting up on side of bed. Report given to primary RN.

## 2024-12-28 NOTE — PROGRESS NOTES
Discontinue of Clonidine patch per MD orders, patient and family updated.  New orders placed, rn to implement

## 2024-12-28 NOTE — PROGRESS NOTES
Was informed that BP is elevated during HD and patient was agitated.   please give iv lebatalol 20 mg once now and continue nicardipine drip  Give some sedation to calm her down so that dialysis is completed  Transfer in icu and continue nicardipine drip if BP above 200/110 mmhg and if she remains confused / agitated after completing Diaysis or unable to take oral pills due to co fusion.   Communicated to dialysis nurse to inform patient nurse and on call hospitalist.   Will plan Dialsysi again tomorrow

## 2024-12-28 NOTE — DISCHARGE INSTRUCTIONS
Continue Hemodialysis every Tues, Thurs & Saturday-  do not miss any sessions for any reason as your health depends on this.  Continue your medications as instructed  Renal-safe diet (low phosphorus/low potassium/ low salt)    Follow up with your PCP

## 2024-12-28 NOTE — PROGRESS NOTES
Patient:  Sandrita Park  :  1995  Gender:  female  MRN #:  [id number]    Assessment:      Sandrita Park is a 29 y.o. year old female  with SLE , lupus nephritis leading to ESRD now on HD , Hypertension, Anemia of CKD , secondary Hyperparathyroidism   CHF presented with confusion . She was admitted in this earlier this month for missing HD for 2 weeks , this time also she missed HD 1 week despite reminder from dialysis unit each session   I had explained the risk of missing dialysis many times which includes uremic encephalopathy , respiratory failure, stroke due to to uncontrolled hypertension , cardiac arrest and death , she is aware of all such risk   She was uremic and had uncontrolled hypertension which is now improved       ESRD  Volume overload/CHF - improved   Hypertensive urgency -improved   Anemia of CKD   Hyperphosphatemia   Uremia - improved       Plan:   Patient examined and labs reviewed.Mental status is at baseline, uremia resolved . BP latest is 145./96 mmHg , plan for dialysis again today for clearance and volume removal   So will stop nicardipine drip to allow UF,  Increase minoxidil 5 mg BID and first dose now , continue losartan 100 mg daily, amlodipine , clonidine ( please send prescription for losartan, amlodipine and minoxidil )   Switch Bumex to 2 mg BID oral before discharge , should continue outpatient coreg at home   Minimize volume infusion   Intake and output     She can go home after dialysis as long as BP well controlled and should continue all her antihypertensive oral meds as above       Subjective/Interval Events    BP better controlled, no dyspnea or chest pain   No nausea/vomiting and abdominal pain   Mental status normal at baseline now       Blood pressure (!) 145/96, pulse 92, temperature 98.2 °F (36.8 °C), resp. rate 18, height 1.676 m (5' 6\"), weight 63.5 kg (140 lb), SpO2 100%.    Exam:    Pt awake and alert     Ext: 1 + edema   oriented     [unfilled]   Recent Labs

## 2024-12-28 NOTE — DISCHARGE SUMMARY
UROGENITAL VALERY ISOLATED          COVID-19 & Influenza Combo [[id number]] Collected: 12/26/24 1000    Order Status: Completed Specimen: Nasopharyngeal Updated: 12/26/24 1046     Source Nasopharyngeal        SARS-CoV-2, PCR Not detected        Comment: Not Detected results do not preclude SARS-CoV-2 infection and should not be used as the sole basis for patient management decisions.Results must be combined with clinical observations, patient history, and epidemiological information.        Rapid Influenza A By PCR Not detected        Rapid Influenza B By PCR Not detected        Comment: Testing was performed using jose alfredo Dominique SARS-CoV-2 and Influenza A/B nucleic acid assay.  This test is a multiplex Real-Time Reverse Transcriptase Polymerase Chain Reaction (RT-PCR) based in vitro diagnostic test intended for the qualitative detection of nucleic acids from SARS-CoV-2, Influenza A, and Influenza B in nasopharyngeal specimens.                 Labs: Results:       Chemistry Recent Labs     12/26/24  0900 12/26/24  1912 12/27/24  0416 12/28/24  0404    140 139 136   K 6.1* 3.7 4.1 3.6    105 107 101   CO2 18* 27 24 28   * 28* 37* 17   GLOB 3.9 4.2*  --   --       CBC w/Diff Recent Labs     12/26/24  0900 12/27/24 0416 12/28/24  0404   WBC 9.6 5.6 4.8   RBC 3.53* 3.54* 4.16*   HGB 9.7* 9.6* 11.3*   HCT 31.2* 30.6* 35.9   * 84* 115*      Cardiac Enzymes No results for input(s): \"CPK\" in the last 72 hours.    Invalid input(s): \"CKRMB\", \"CKND1\", \"TROIP\", \"YESICA\"   Coagulation No results for input(s): \"INR\", \"APTT\" in the last 72 hours.    Invalid input(s): \"PTP\"    Lipid Panel No results found for: \"CHOL\", \"CHOLPOCT\", \"CHLST\", \"CHOLV\", \"979613\", \"HDL\", \"HDLC\", \"LDL\", \"LDLC\", \"VLDLC\", \"VLDL\"   BNP Invalid input(s): \"BNPP\"   Liver Enzymes No results for input(s): \"TP\" in the last 72 hours.    Invalid input(s): \"ALB\", \"TBIL\", \"AP\", \"SGOT\", \"GPT\", \"DBIL\"   Thyroid Studies Lab Results   Component Value  Date/Time    TSH 8.12 12/26/2024 07:12 PM            Pertinent Diagnostic Studies:      XR CHEST PORTABLE : 12/26/2024  INDICATION: Altered mental status, hypertensive. Portable AP view of the chest. Direct comparison made to prior chest x-ray dated December 6, 2024. Cardiomediastinal silhouette is stable. Lungs are clear bilaterally. Pleural spaces are normal and there is no pneumothorax. Osseous structures are intact.   No acute cardiopulmonary disease.      CT HEAD WO CONTRAST  12/26/2024  INDICATION: hypertensive, lethargic Exam: Noncontrast CT of the brain is performed with 5 mm collimation. CT dose reduction was achieved with the use of the standardized protocol tailored for this examination and automatic exposure control for dose modulation. Direct comparison is made to prior CT brain dated March 2022 and prior MR brain dated March 2022. FINDINGS: There is no acute intracranial hemorrhage, mass, mass effect or herniation. Ventricular system is normal. The gray-white matter differentiation is well-preserved. The mastoid air cells are well pneumatized. The visualized paranasal sinuses are normal.   FINAL: No acute intracranial hemorrhage, mass or infarct.      ~~~~~~~~~~~~~~~~~~~~~~~~~~~~~~~~~~~~~~~~~~~~~~~~~~~~~~~~~~~~~~  ECHO (TTE) complete (PRN contrast/bubble/strain/3D)   12/8/2024    Image quality is good.   Left Ventricle: Normal left ventricular systolic function with a visually estimated EF of 55 - 60%. Left ventricle size is normal.   Increased wall thickness. Findings consistent with moderate concentric hypertrophy. Normal wall motion. Indeterminate diastolic function due to mitral valve disease. Global longitudinal strain is reduced with a value of -14.8%.   Left Atrium: Left atrium is dilated. Left atrial volume index is mildly increased (35-41 mL/m2).      Mitral Valve: Severe annular calcification at the posterior leaflet.  No stenosis.   Tricuspid Valve :  Unable to assess RVSP due to inadequate  the floor.    Dear patient, if you are reviewing this note and have a question regarding the medical terminology, please bring it with you to your next PCP visit.  Medical notes are meant to be a communication between medical professionals.  Additionally, portion of this note were created using voice recognition function, syntax and phonetic over may have escaped proofreading.  Make sure you have a PCP or specialist follow up, take all discharge papers with you to your next appointment.    DO Gabrielle Nuñez [facility]    CC: Jj Mo MD

## 2024-12-28 NOTE — PROGRESS NOTES
Hospitalist Progress Note      Patient name: Sandrita Park.  MRN: [id number]          PCP: Jj Mo MD    Summary:           Sandrita Park is a 29 y.o.  female w/ PMHx of HTN, SLE w/ lupus nephritis & ESRD on HD T,T,S, bipolar disorder on depakote, hypothyroidism, GERD and medical non-adherence to dialysis who presents after missing 1 week of dialysis confused and with SOB.  She presented to the ED with confusion, & underwent emergent HD with a plan for discharge from the ED after HD and resuming home meds, but unfortunately, BP still remained markedly elevated after HD and she was unable to take her PO meds due to confusion.  She was then admitted on a nicardipine drip with home meds resumed. Nephrology followed for hemodialysis.      Assessment/Plan:  Medical Complexity: High-1 acute medical problem with high risk threat, at least 3 follow up items    # ESRD, on HD TTS (AVF)- missed HD sessions x 1 week, presented w/ uremia Cr 18,  & confusion  # Hx of Medical non-adherence *    Patient is well known to hospital systems in area w/ frequent missed HD/medications.   Pt states she takes meds 'sometimes'.  Often unable to eat as no appetite and then trouble taking meds.  States she missed 1 week of HD all due to her medical cab not coming.  (Pt/mother state no money to pay for personal cab).   Despite discussion and counseling er various physicians, pt and mother seem resistant to suggestions/counseling.    - Nephrology following for HD.   S/p HD 12/26 & 12/27  (refused session 12/28 due to arm soreness- counseled)- BP still uncontrolled.   Reattempt HD on 12/29       # Hypertensive emergency  # HTN, renovascular    - s/p emergent HD (UF -4.5L) on admission.  Admitted on nicardipine drip + labetalol prn, new losartan, home norvasc w/ goal to wean off drip     - appreciate nephrology following for HD plan for dialysis ip again on 12/ 27.     - started on clonidipine patch; amlodipine, prn labetalol.   review of tests)  Obtaining and/or reviewing separately obtained history  Performing a medically necessary appropriate examination and/or evaluation  Counseling and educating the patient/family/caregiver  Ordering medications, tests, or procedures  Referring and communicating with other health care professionals as needed  Documenting clinical information in the electronic or other health record  Independently interpreting results (not reported separately) and communicating results to the patient/family/caregiver  Care coordination and discharge planning with Case Management.        Dear patient Sandrita Park, if you are reviewing this note and have a question regarding the medical terminology, please bring it with you to your next PCP visit.  Medical notes are meant to be a communication between medical professionals.        Electronically signed by: CARLYLE [facility]  12/28/24

## 2024-12-28 NOTE — PLAN OF CARE
Problem: Discharge Planning  Goal: Discharge to home or other facility with appropriate resources  Outcome: Completed     Problem: Pain  Goal: Verbalizes/displays adequate comfort level or baseline comfort level  Outcome: Completed     Problem: Skin/Tissue Integrity  Goal: Absence of new skin breakdown  Description: 1.  Monitor for areas of redness and/or skin breakdown  2.  Assess vascular access sites hourly  3.  Every 4-6 hours minimum:  Change oxygen saturation probe site  4.  Every 4-6 hours:  If on nasal continuous positive airway pressure, respiratory therapy assess nares and determine need for appliance change or resting period.  Outcome: Completed     Problem: Safety - Adult  Goal: Free from fall injury  Outcome: Completed     Problem: Neurosensory - Adult  Goal: Achieves stable or improved neurological status  Outcome: Completed  Goal: Absence of seizures  Outcome: Completed  Goal: Remains free of injury related to seizures activity  Outcome: Completed  Goal: Achieves maximal functionality and self care  Outcome: Completed     Problem: Musculoskeletal - Adult  Goal: Return mobility to safest level of function  Outcome: Completed  Goal: Maintain proper alignment of affected body part  Outcome: Completed  Goal: Return ADL status to a safe level of function  Outcome: Completed     Problem: Infection - Adult  Goal: Absence of infection at discharge  Outcome: Completed  Goal: Absence of infection during hospitalization  Outcome: Completed  Goal: Absence of fever/infection during anticipated neutropenic period  Outcome: Completed     Problem: Metabolic/Fluid and Electrolytes - Adult  Goal: Electrolytes maintained within normal limits  Outcome: Completed  Goal: Hemodynamic stability and optimal renal function maintained  Outcome: Completed  Goal: Glucose maintained within prescribed range  Outcome: Completed     Problem: Hematologic - Adult  Goal: Maintains hematologic stability  Outcome: Completed

## 2024-12-28 NOTE — PLAN OF CARE
Problem: Discharge Planning  Goal: Discharge to home or other facility with appropriate resources  Outcome: Progressing  Flowsheets (Taken 12/27/2024 1915)  Discharge to home or other facility with appropriate resources:   Arrange for needed discharge resources and transportation as appropriate   Identify barriers to discharge with patient and caregiver   Identify discharge learning needs (meds, wound care, etc)     Problem: Pain  Goal: Verbalizes/displays adequate comfort level or baseline comfort level  Outcome: Progressing  Flowsheets (Taken 12/27/2024 1915)  Verbalizes/displays adequate comfort level or baseline comfort level:   Encourage patient to monitor pain and request assistance   Assess pain using appropriate pain scale   Administer analgesics based on type and severity of pain and evaluate response     Problem: Skin/Tissue Integrity  Goal: Absence of new skin breakdown  Description: 1.  Monitor for areas of redness and/or skin breakdown  2.  Assess vascular access sites hourly  3.  Every 4-6 hours minimum:  Change oxygen saturation probe site  4.  Every 4-6 hours:  If on nasal continuous positive airway pressure, respiratory therapy assess nares and determine need for appliance change or resting period.  Outcome: Progressing     Problem: Safety - Adult  Goal: Free from fall injury  Outcome: Progressing  Flowsheets (Taken 12/27/2024 1915)  Free From Fall Injury:   Instruct family/caregiver on patient safety   Based on caregiver fall risk screen, instruct family/caregiver to ask for assistance with transferring infant if caregiver noted to have fall risk factors     Problem: Neurosensory - Adult  Goal: Achieves stable or improved neurological status  Outcome: Progressing  Flowsheets (Taken 12/27/2024 1915)  Achieves stable or improved neurological status:   Assess for and report changes in neurological status   Initiate measures to prevent increased intracranial pressure   Maintain blood pressure and

## 2024-12-29 LAB — CORTIS AM PEAK SERPL-MCNC: 14.8 UG/DL (ref 4.3–22.45)

## 2025-01-03 LAB
INTERPRETATION: NORMAL
METHYLMALONIC ACID: 224 NMOL/L (ref 0–378)
VIT B12 SERPL-MCNC: 364 PG/ML (ref 232–1245)

## (undated) DEVICE — DERMABOND SKIN ADH 0.7ML --

## (undated) DEVICE — [HIGH FLOW INSUFFLATOR,  DO NOT USE IF PACKAGE IS DAMAGED,  KEEP DRY,  KEEP AWAY FROM SUNLIGHT,  PROTECT FROM HEAT AND RADIOACTIVE SOURCES.]: Brand: PNEUMOSURE

## (undated) DEVICE — SYR 50ML LR LCK 1ML GRAD NSAF --

## (undated) DEVICE — TROCARS: Brand: KII® OPTICAL ACCESS SYSTEM

## (undated) DEVICE — 3M™ TEGADERM™ TRANSPARENT FILM DRESSING FRAME STYLE, 1626W, 4 IN X 4-3/4 IN (10 CM X 12 CM), 50/CT 4CT/CASE: Brand: 3M™ TEGADERM™

## (undated) DEVICE — GARMENT,MEDLINE,DVT,INT,CALF,MED, GEN2: Brand: MEDLINE

## (undated) DEVICE — THIS SET CONSISTS OF A FEMALE LOCKING CONNECTOR/ON-0FF CLAMP ASSEMBLY, TUBING AND DOUBLE SEALING MALE LUER LOCK CONNECTOR. THIS SET IT TO BE USED WITH BAXTER LOCKING TITANIUM ADAPTER FOR PERITONEAL DIALYSIS CATHETER IN DISCONNECT APPLICATIONS AND IN CYCLER APPLICATIONS WHERE ASEPTIC CONNECTIONS AND DISCONNECTIONS ARE PERFORMED AT THE TRANSFER SET/CYCLER SET JUNCTURE.: Brand: MINICAP

## (undated) DEVICE — GAUZE PKG STRP IODOFRM 1/4X5YD --

## (undated) DEVICE — THIS DEVICE IS A PLASTIC DISCONNECT CAP FOR PERITONEAL DIALYSIS AND CONTAINS POVIDONE-IODINE INTENDED TO PROTECT THE FEMALE LUER CONNECTOR OF THE BAXTER TRANSFER SET.: Brand: MINICAP WITH POVIDONE-IODINE SOLUTION

## (undated) DEVICE — LAPAROSCOPIC TROCAR SLEEVE/SINGLE USE: Brand: KII® OPTICAL ACCESS SYSTEM

## (undated) DEVICE — DECANTING SET

## (undated) DEVICE — SYRINGE CATH TIP 50ML

## (undated) DEVICE — Device

## (undated) DEVICE — TROCAR: Brand: KII® SLEEVE

## (undated) DEVICE — DRESSING FOAM DISK DIA1IN H 7MM HYDRPHLC CHG IMPREG IN SL

## (undated) DEVICE — 3-0 COATED VICRYL PLUS UNDYED 1X27" SH --

## (undated) DEVICE — LAP CHOLE: Brand: MEDLINE INDUSTRIES, INC.

## (undated) DEVICE — SOL IRRIGATION INJ NACL 0.9% 500ML BTL

## (undated) DEVICE — SUT MONOCRYL PLUS UD 4-0 --

## (undated) DEVICE — THIS ADAPTER IS A DOUBLE SEALING FEMALE LUER LOCK ADAPTER WITH A 2-PIECE, COMBINATION COMPRESSION FIT/BARBED CATHETER CONNECTOR. THE ADAPTER IS USED TO CONNECT THE PD CATHETER TO A SOLUTION TRANSFER SET WITH LOCKING CONNECTOR.: Brand: LOCKING TITANIUM ADAPTER FOR PERITONEAL DIALYSIS CATHETER

## (undated) DEVICE — GLOVE SURG SZ 8 L12IN THK75MIL DK GRN LTX FREE

## (undated) DEVICE — E-Z CLEAN, PTFE COATED, ELECTROSURGICAL LAPAROSCOPIC ELECTRODE, L-HOOK, 33 CM., SINGLE-USE, FOR USE WITH HAND CONTROL PENCIL: Brand: MEGADYNE

## (undated) DEVICE — SUTURE MCRYL SZ 4-0 L18IN ABSRB UD L19MM PS-2 3/8 CIR PRIM Y496G

## (undated) DEVICE — SOLUTION IV 1000ML 0.9% SOD CHL

## (undated) DEVICE — TROCAR ENDOSCP SHFT L150MM DIA8MM TEAL BLDELSS W/ STBL

## (undated) DEVICE — SUTURE VCRL SZ 3-0 L27IN ABSRB UD L26MM SH 1/2 CIR J416H

## (undated) DEVICE — GLOVE ORANGE PI 7 1/2   MSG9075

## (undated) DEVICE — STERILE POLYISOPRENE POWDER-FREE SURGICAL GLOVES WITH EMOLLIENT COATING: Brand: PROTEXIS